# Patient Record
Sex: FEMALE | Race: WHITE | NOT HISPANIC OR LATINO | Employment: OTHER | URBAN - METROPOLITAN AREA
[De-identification: names, ages, dates, MRNs, and addresses within clinical notes are randomized per-mention and may not be internally consistent; named-entity substitution may affect disease eponyms.]

---

## 2018-03-06 ENCOUNTER — CONVERSION ENCOUNTER (OUTPATIENT)
Dept: OTHER | Facility: HOSPITAL | Age: 58
End: 2018-03-06

## 2018-03-14 ENCOUNTER — OFFICE VISIT CONVERTED (OUTPATIENT)
Dept: FAMILY MEDICINE CLINIC | Age: 58
End: 2018-03-14
Attending: FAMILY MEDICINE

## 2018-07-05 ENCOUNTER — OFFICE VISIT CONVERTED (OUTPATIENT)
Dept: FAMILY MEDICINE CLINIC | Age: 58
End: 2018-07-05
Attending: FAMILY MEDICINE

## 2018-10-10 ENCOUNTER — OFFICE VISIT CONVERTED (OUTPATIENT)
Dept: FAMILY MEDICINE CLINIC | Age: 58
End: 2018-10-10
Attending: FAMILY MEDICINE

## 2019-02-01 ENCOUNTER — OFFICE VISIT CONVERTED (OUTPATIENT)
Dept: FAMILY MEDICINE CLINIC | Age: 59
End: 2019-02-01
Attending: FAMILY MEDICINE

## 2019-02-01 ENCOUNTER — HOSPITAL ENCOUNTER (OUTPATIENT)
Dept: OTHER | Facility: HOSPITAL | Age: 59
Discharge: HOME | End: 2019-02-01
Attending: FAMILY MEDICINE

## 2019-02-01 LAB
ALBUMIN SERPL-MCNC: 4.6 G/DL (ref 3.5–5)
ALBUMIN/GLOB SERPL: 1.4 {RATIO} (ref 1.4–2.6)
ALP SERPL-CCNC: 90 U/L (ref 53–141)
ALT SERPL-CCNC: 7 U/L (ref 10–40)
ANION GAP SERPL CALC-SCNC: 20 MMOL/L (ref 8–19)
AST SERPL-CCNC: 10 U/L (ref 15–50)
BILIRUB SERPL-MCNC: 0.21 MG/DL (ref 0.2–1.3)
BUN SERPL-MCNC: 12 MG/DL (ref 5–25)
BUN/CREAT SERPL: 18 {RATIO} (ref 6–20)
CALCIUM SERPL-MCNC: 9.4 MG/DL (ref 8.7–10.4)
CHLORIDE SERPL-SCNC: 101 MMOL/L (ref 99–111)
CHOLEST SERPL-MCNC: 207 MG/DL (ref 107–200)
CHOLEST/HDLC SERPL: 3.7 {RATIO} (ref 3–6)
CONV CO2: 26 MMOL/L (ref 22–32)
CONV TOTAL PROTEIN: 7.8 G/DL (ref 6.3–8.2)
CREAT UR-MCNC: 0.65 MG/DL (ref 0.5–0.9)
GFR SERPLBLD BASED ON 1.73 SQ M-ARVRAT: >60 ML/MIN/{1.73_M2}
GLOBULIN UR ELPH-MCNC: 3.2 G/DL (ref 2–3.5)
GLUCOSE SERPL-MCNC: 127 MG/DL (ref 65–99)
HDLC SERPL-MCNC: 56 MG/DL (ref 40–60)
LDLC SERPL CALC-MCNC: 102 MG/DL (ref 70–100)
OSMOLALITY SERPL CALC.SUM OF ELEC: 297 MOSM/KG (ref 273–304)
POTASSIUM SERPL-SCNC: 3.9 MMOL/L (ref 3.5–5.3)
SODIUM SERPL-SCNC: 143 MMOL/L (ref 135–147)
TRIGL SERPL-MCNC: 247 MG/DL (ref 40–150)
TSH SERPL-ACNC: 0.37 M[IU]/L (ref 0.27–4.2)
VLDLC SERPL-MCNC: 49 MG/DL (ref 5–37)

## 2019-02-18 ENCOUNTER — OFFICE VISIT CONVERTED (OUTPATIENT)
Dept: PLASTIC SURGERY | Facility: CLINIC | Age: 59
End: 2019-02-18
Attending: PLASTIC SURGERY

## 2019-03-11 ENCOUNTER — HOSPITAL ENCOUNTER (OUTPATIENT)
Dept: OTHER | Facility: HOSPITAL | Age: 59
Discharge: HOME | End: 2019-03-11
Attending: FAMILY MEDICINE

## 2019-05-01 ENCOUNTER — OFFICE VISIT CONVERTED (OUTPATIENT)
Dept: FAMILY MEDICINE CLINIC | Age: 59
End: 2019-05-01
Attending: FAMILY MEDICINE

## 2019-05-21 ENCOUNTER — HOSPITAL ENCOUNTER (OUTPATIENT)
Dept: OTHER | Facility: HOSPITAL | Age: 59
Discharge: HOME | End: 2019-05-21
Attending: FAMILY MEDICINE

## 2019-05-21 LAB
CREAT BLD-MCNC: 0.7 MG/DL (ref 0.6–1.4)
GFR SERPLBLD BASED ON 1.73 SQ M-ARVRAT: >60 ML/MIN/{1.73_M2}

## 2019-07-24 ENCOUNTER — OFFICE VISIT CONVERTED (OUTPATIENT)
Dept: OTOLARYNGOLOGY | Facility: CLINIC | Age: 59
End: 2019-07-24
Attending: OTOLARYNGOLOGY

## 2019-08-01 ENCOUNTER — OFFICE VISIT CONVERTED (OUTPATIENT)
Dept: FAMILY MEDICINE CLINIC | Age: 59
End: 2019-08-01
Attending: FAMILY MEDICINE

## 2019-08-01 ENCOUNTER — HOSPITAL ENCOUNTER (OUTPATIENT)
Dept: OTHER | Facility: HOSPITAL | Age: 59
Discharge: HOME | End: 2019-08-01
Attending: FAMILY MEDICINE

## 2019-08-01 LAB
ALBUMIN SERPL-MCNC: 4.3 G/DL (ref 3.5–5)
ALBUMIN/GLOB SERPL: 1.4 {RATIO} (ref 1.4–2.6)
ALP SERPL-CCNC: 96 U/L (ref 53–141)
ALT SERPL-CCNC: 11 U/L (ref 10–40)
ANION GAP SERPL CALC-SCNC: 20 MMOL/L (ref 8–19)
AST SERPL-CCNC: 12 U/L (ref 15–50)
BILIRUB SERPL-MCNC: 0.25 MG/DL (ref 0.2–1.3)
BUN SERPL-MCNC: 12 MG/DL (ref 5–25)
BUN/CREAT SERPL: 17 {RATIO} (ref 6–20)
CALCIUM SERPL-MCNC: 9.5 MG/DL (ref 8.7–10.4)
CHLORIDE SERPL-SCNC: 103 MMOL/L (ref 99–111)
CHOLEST SERPL-MCNC: 205 MG/DL (ref 107–200)
CHOLEST/HDLC SERPL: 3.6 {RATIO} (ref 3–6)
CONV CO2: 24 MMOL/L (ref 22–32)
CONV TOTAL PROTEIN: 7.3 G/DL (ref 6.3–8.2)
CREAT UR-MCNC: 0.69 MG/DL (ref 0.5–0.9)
GFR SERPLBLD BASED ON 1.73 SQ M-ARVRAT: >60 ML/MIN/{1.73_M2}
GLOBULIN UR ELPH-MCNC: 3 G/DL (ref 2–3.5)
GLUCOSE SERPL-MCNC: 134 MG/DL (ref 65–99)
HDLC SERPL-MCNC: 57 MG/DL (ref 40–60)
LDLC SERPL CALC-MCNC: 94 MG/DL (ref 70–100)
OSMOLALITY SERPL CALC.SUM OF ELEC: 298 MOSM/KG (ref 273–304)
POTASSIUM SERPL-SCNC: 3.7 MMOL/L (ref 3.5–5.3)
SODIUM SERPL-SCNC: 143 MMOL/L (ref 135–147)
TRIGL SERPL-MCNC: 269 MG/DL (ref 40–150)
TSH SERPL-ACNC: 1.63 M[IU]/L (ref 0.27–4.2)
VLDLC SERPL-MCNC: 54 MG/DL (ref 5–37)

## 2019-11-04 ENCOUNTER — OFFICE VISIT CONVERTED (OUTPATIENT)
Dept: FAMILY MEDICINE CLINIC | Age: 59
End: 2019-11-04
Attending: FAMILY MEDICINE

## 2019-12-04 ENCOUNTER — HOSPITAL ENCOUNTER (OUTPATIENT)
Dept: OTHER | Facility: HOSPITAL | Age: 59
Discharge: HOME | End: 2019-12-04
Attending: PSYCHIATRY & NEUROLOGY

## 2020-02-05 ENCOUNTER — OFFICE VISIT CONVERTED (OUTPATIENT)
Dept: FAMILY MEDICINE CLINIC | Age: 60
End: 2020-02-05
Attending: FAMILY MEDICINE

## 2020-05-05 ENCOUNTER — OFFICE VISIT CONVERTED (OUTPATIENT)
Dept: FAMILY MEDICINE CLINIC | Age: 60
End: 2020-05-05
Attending: FAMILY MEDICINE

## 2020-08-05 ENCOUNTER — OFFICE VISIT CONVERTED (OUTPATIENT)
Dept: FAMILY MEDICINE CLINIC | Age: 60
End: 2020-08-05
Attending: FAMILY MEDICINE

## 2020-09-03 ENCOUNTER — HOSPITAL ENCOUNTER (OUTPATIENT)
Dept: OTHER | Facility: HOSPITAL | Age: 60
Discharge: HOME | End: 2020-09-03
Attending: FAMILY MEDICINE

## 2020-09-03 LAB
CREAT BLD-MCNC: 0.8 MG/DL (ref 0.6–1.4)
GFR SERPLBLD BASED ON 1.73 SQ M-ARVRAT: >60 ML/MIN/{1.73_M2}

## 2020-09-24 ENCOUNTER — HOSPITAL ENCOUNTER (OUTPATIENT)
Dept: OTHER | Facility: HOSPITAL | Age: 60
Discharge: HOME | End: 2020-09-24
Attending: FAMILY MEDICINE

## 2020-10-14 ENCOUNTER — OFFICE VISIT CONVERTED (OUTPATIENT)
Dept: FAMILY MEDICINE CLINIC | Age: 60
End: 2020-10-14
Attending: FAMILY MEDICINE

## 2020-12-16 ENCOUNTER — OFFICE VISIT CONVERTED (OUTPATIENT)
Dept: OTOLARYNGOLOGY | Facility: CLINIC | Age: 60
End: 2020-12-16
Attending: OTOLARYNGOLOGY

## 2020-12-21 ENCOUNTER — HOSPITAL ENCOUNTER (OUTPATIENT)
Dept: OTHER | Facility: HOSPITAL | Age: 60
Discharge: HOME | End: 2020-12-21
Attending: OTOLARYNGOLOGY

## 2020-12-23 ENCOUNTER — OFFICE VISIT CONVERTED (OUTPATIENT)
Dept: OTOLARYNGOLOGY | Facility: CLINIC | Age: 60
End: 2020-12-23
Attending: OTOLARYNGOLOGY

## 2021-03-31 ENCOUNTER — OFFICE VISIT CONVERTED (OUTPATIENT)
Dept: FAMILY MEDICINE CLINIC | Age: 61
End: 2021-03-31
Attending: FAMILY MEDICINE

## 2021-03-31 ENCOUNTER — HOSPITAL ENCOUNTER (OUTPATIENT)
Dept: OTHER | Facility: HOSPITAL | Age: 61
Discharge: HOME | End: 2021-03-31
Attending: FAMILY MEDICINE

## 2021-03-31 LAB
ALBUMIN SERPL-MCNC: 4.2 G/DL (ref 3.5–5)
ALBUMIN/GLOB SERPL: 1.4 {RATIO} (ref 1.4–2.6)
ALP SERPL-CCNC: 88 U/L (ref 43–160)
ALT SERPL-CCNC: 9 U/L (ref 10–40)
ANION GAP SERPL CALC-SCNC: 17 MMOL/L (ref 8–19)
APPEARANCE UR: ABNORMAL
AST SERPL-CCNC: 13 U/L (ref 15–50)
BACTERIA UR CULT: NORMAL
BACTERIA UR QL AUTO: ABNORMAL
BILIRUB SERPL-MCNC: 0.21 MG/DL (ref 0.2–1.3)
BILIRUB UR QL: NEGATIVE
BUN SERPL-MCNC: 18 MG/DL (ref 5–25)
BUN/CREAT SERPL: 21 {RATIO} (ref 6–20)
CALCIUM SERPL-MCNC: 9.2 MG/DL (ref 8.7–10.4)
CASTS URNS QL MICRO: ABNORMAL /[LPF]
CHLORIDE SERPL-SCNC: 102 MMOL/L (ref 99–111)
CHOLEST SERPL-MCNC: 245 MG/DL (ref 107–200)
CHOLEST/HDLC SERPL: 4.2 {RATIO} (ref 3–6)
COLOR UR: YELLOW
CONV CO2: 25 MMOL/L (ref 22–32)
CONV LEUKOCYTE ESTERASE: ABNORMAL
CONV TOTAL PROTEIN: 7.3 G/DL (ref 6.3–8.2)
CONV UROBILINOGEN IN URINE BY AUTOMATED TEST STRIP: 0.2 {EHRLICHU}/DL (ref 0.1–1)
CREAT UR-MCNC: 0.87 MG/DL (ref 0.5–0.9)
EPI CELLS #/AREA URNS HPF: ABNORMAL /[HPF]
GFR SERPLBLD BASED ON 1.73 SQ M-ARVRAT: >60 ML/MIN/{1.73_M2}
GLOBULIN UR ELPH-MCNC: 3.1 G/DL (ref 2–3.5)
GLUCOSE 24H UR-MCNC: NEGATIVE MG/DL
GLUCOSE SERPL-MCNC: 97 MG/DL (ref 65–99)
HDLC SERPL-MCNC: 58 MG/DL (ref 40–60)
HGB UR QL STRIP: ABNORMAL
KETONES UR QL STRIP: NEGATIVE MG/DL
LDLC SERPL CALC-MCNC: 153 MG/DL (ref 70–100)
MUCOUS THREADS URNS QL MICRO: ABNORMAL
NITRITE UR-MCNC: POSITIVE MG/ML
OSMOLALITY SERPL CALC.SUM OF ELEC: 292 MOSM/KG (ref 273–304)
PH UR STRIP.AUTO: 5.5 [PH] (ref 5–8)
POTASSIUM SERPL-SCNC: 3.9 MMOL/L (ref 3.5–5.3)
PROT UR-MCNC: NEGATIVE MG/DL
RBC # BLD AUTO: ABNORMAL /[HPF]
SODIUM SERPL-SCNC: 140 MMOL/L (ref 135–147)
SP GR UR STRIP: 1.02 (ref 1–1.03)
SPECIMEN SOURCE: ABNORMAL
TRIGL SERPL-MCNC: 170 MG/DL (ref 40–150)
TSH SERPL-ACNC: 1.99 M[IU]/L (ref 0.27–4.2)
UNIDENT CRYS URNS QL MICRO: ABNORMAL /[HPF]
VLDLC SERPL-MCNC: 34 MG/DL (ref 5–37)
WBC #/AREA URNS HPF: ABNORMAL /[HPF]

## 2021-04-02 LAB
AMPICILLIN SUSC ISLT: 8
AMPICILLIN+SULBAC SUSC ISLT: 4
ASO AB SERPL-ACNC: 32 [IU]/ML (ref 0–200)
BACTERIA UR CULT: ABNORMAL
CEFAZOLIN SUSC ISLT: <=4
CEFEPIME SUSC ISLT: <=0.12
CEFTAZIDIME SUSC ISLT: <=1
CEFTRIAXONE SUSC ISLT: <=0.25
CIPROFLOXACIN SUSC ISLT: <=0.25
CONV RHEUMATOID FACTOR IGM: <10 [IU]/ML (ref 0–14)
CRP SERPL-MCNC: 12.8 MG/L (ref 0–5)
DSDNA AB SER-ACNC: NEGATIVE [IU]/ML
ENA AB SER IA-ACNC: NEGATIVE {RATIO}
ERTAPENEM SUSC ISLT: <=0.12
ERYTHROCYTE [SEDIMENTATION RATE] IN BLOOD: 21 MM/H (ref 0–30)
GENTAMICIN SUSC ISLT: <=1
LEVOFLOXACIN SUSC ISLT: <=0.12
NITROFURANTOIN SUSC ISLT: <=16
PHOSPHATE SERPL-MCNC: 3.2 MG/DL (ref 2.4–4.5)
PIP+TAZO SUSC ISLT: <=4
TMP SMX SUSC ISLT: <=20
TOBRAMYCIN SUSC ISLT: <=1
URATE SERPL-MCNC: 5.4 MG/DL (ref 2.5–7.5)

## 2021-05-14 VITALS — RESPIRATION RATE: 16 BRPM | BODY MASS INDEX: 35.38 KG/M2 | HEIGHT: 64 IN | WEIGHT: 207.25 LBS | TEMPERATURE: 97.9 F

## 2021-05-14 VITALS — RESPIRATION RATE: 16 BRPM | TEMPERATURE: 96.7 F | WEIGHT: 204.5 LBS | HEIGHT: 64 IN | BODY MASS INDEX: 34.91 KG/M2

## 2021-05-14 NOTE — PROGRESS NOTES
Progress Note      Patient Name: Daisy Cesar   Patient ID: [id number]   Sex: Female   YOB: 1960    Primary Care Provider: Elise Cueto MD   Referring Provider: Elise Cueto MD    Visit Date: December 23, 2020    Provider: Gil Diane MD   Location: [facility]   Location Address: [address] [place] [address]  [address] [place] [address]  [id number]   Location Phone: [phone]          Chief Complaint     1.  Left facial pain and pressure, possible sinusitis    2.  Allergic rhinitis    3.  Benign paroxysmal positional vertigo, right ear       History Of Present Illness  Daisy Cesar is a 60 year old /White,  or  female who presents to the office today for a follow-up visit.      She presents the clinic today for follow-up regarding nasal congestion, allergic rhinitis, and sinusitis, as well as vertigo spells.  She does have issues with allergic rhinitis, and was having significant left-sided facial pain and pressure after having a maxillary tooth pulled on that side in September.  I obtained a CT scan of her sinuses and reviewed the imaging today.  The scan shows no significant evidence of sinusitis and is essentially completely normal as far as her sinuses and nasal anatomy is concerned.  There was no evidence of soft tissue mass within the left cheek area.  She notes intermittent nasal congestion which is worsened by allergies.  She is doing intermittent nasal saline washes.    Her secondary complaint is vertigo spells which are more frequent when she rolls over in bed.  This was mostly happening on the left side previously, and I have done Epley maneuvers on her for this.  She notes that she has several spells per week.  She describes them as short-lived vertigo.  She also has lightheadedness on orthostatic changes and notes no vertigo with the spells.       Past Medical History  Acne; Allergic rhinitis; Allergies; Anemia; Arthritis;  Asthma; Cataract; Cervicalgia; Change in voice; Chronic Sinusitis; Cough; Difficulty swallowing; Ear pain, right; Epistaxis, recurrent; Facial pressure; Head injury; Hearing loss; High blood pressure; High cholesterol; Hoarseness of voice; Hx of cold sores; Hypotension; Keloid; Neck swelling; Sinus drainage         Past Surgical History  Arthroscopic Knee Surgery; Arthroscopic Shoulder Surgery; Carpal tunnel release; Cervical Fusion; Cholecstectomy; Gallbladder; Hysterectomy; Tonsilectomy; Tumor removal; Ulnar nerve transposition, left; Ulnar nerve transposition, right         Medication List  acetaminophen 500 mg oral tablet; Benadryl 25 mg oral capsule; Caltrate 600 + D 600 mg (1,500 mg)-800 unit oral tablet,chewable; diclofenac sodium 1 % topical gel; dicyclomine 10 mg oral capsule; Levoxyl 50 mcg oral tablet; meloxicam 15 mg oral tablet; metronidazole 0.75 % topical cream; Migranal 0.5 mg/pump act. (4 mg/mL) nasal spray,non-aerosol; pravastatin 40 mg oral tablet; Prilosec 40 mg oral capsule,delayed release(DR/EC); promethazine 25 mg oral tablet; Ventolin HFA 90 mcg/actuation inhalation HFA aerosol inhaler; Zanaflex 4 mg oral capsule         Allergy List  morphine; PENICILLINS         Family Medical History  Family history of back pain         Social History  Tobacco (Never)         Review of Systems  · Constitutional  o Admits  o : night sweats  o Denies  o : fever, weight loss  · Eyes  o Denies  o : discharge from eye, impaired vision  · HENT  o Admits  o : *See HPI  · Cardiovascular  o Denies  o : chest pain, irregular heart beats  · Respiratory  o Denies  o : shortness of breath, wheezing, coughing up blood  · Gastrointestinal  o Admits  o : heartburn, reflux  o Denies  o : vomiting blood  · Genitourinary  o Admits  o : frequency of urine  · Integument  o Admits  o : skin dryness  o Denies  o : rash  · Neurologic  o Admits  o : loss of balance, dizziness  o Denies  o : seizures, loss of  "consciousness  · Endocrine  o Admits  o : cold intolerance, heat intolerance  · Heme-Lymph  o Admits  o : easy bleeding  o Denies  o : anemia      Vitals  Date Time BP Position Site L\R Cuff Size HR RR TEMP (F) WT  HT  BMI kg/m2 BSA m2 O2 Sat FR L/min FiO2 HC       12/23/2020 11:40 AM       16 96.7 204lbs 8oz 5'  4\" 35.1 2.05             Physical Examination  · Constitutional  o Appearance  o : well developed, well-nourished, alert and in no acute distress, voice clear and strong  · Head and Face  o Head  o :   § Inspection  § : no deformities or lesions  o Face  o :   § Inspection  § : No facial lesions; House-Brackmann I/VI bilaterally  § Palpation  § : No TMJ crepitus nor  muscle tenderness bilaterally  · Eyes  o Vision  o :   § Visual Fields  § : Extraocular movements are intact. No spontaneous or gaze-induced nystagmus.  o Conjunctivae  o : clear  o Sclerae  o : clear  o Pupils and Irises  o : pupils equal, round, and reactive to light.   · Ears, Nose, Mouth and Throat  o Ears  o :   § External Ears  § : appearance within normal limits, no lesions present  § Otoscopic Examination  § : tympanic membrane appearance within normal limits bilaterally without perforations, well-aerated middle ears  § Hearing  § : intact to conversational voice both ears  o Nose  o :   § External Nose  § : appearance normal  § Intranasal Exam  § : mucosa within normal limits, vestibules normal, no intranasal lesions present, septum midline, sinuses non tender to percussion  o Oral Cavity  o :   § Oral Mucosa  § : oral mucosa normal without pallor or cyanosis  § Lips  § : lip appearance normal  § Teeth  § : normal dentition for age  § Gums  § : gums pink, non-swollen, no bleeding present  § Tongue  § : tongue appearance normal; normal mobility  § Palate  § : hard palate normal, soft palate appearance normal with symmetric mobility  o Throat  o :   § Oropharynx  § : no inflammation or lesions present, tonsils within normal " limits  § Hypopharynx  § : appearance within normal limits, superior epiglottis within normal limits  § Larynx  § : appearance within normal limits, vocal cords within normal limits, no lesions present  · Neck  o Inspection/Palpation  o : normal appearance, no masses or tenderness, trachea midline; thyroid size normal, nontender, no nodules or masses present on palpation  · Respiratory  o Respiratory Effort  o : breathing unlabored  o Inspection of Chest  o : normal appearance, no retractions  · Cardiovascular  o Heart  o : regular rate and rhythm  · Lymphatic  o Neck  o : no lymphadenopathy present  o Supraclavicular Nodes  o : no lymphadenopathy present  o Preauricular Nodes  o : no lymphadenopathy present  · Skin and Subcutaneous Tissue  o General Inspection  o : Regarding face and neck - there are no rashes present, no lesions present, and no areas of discoloration  · Neurologic  o Cranial Nerves  o : cranial nerves II-XII are grossly intact bilaterally  o Gait and Station  o : normal gait, [place]-Hallpike maneuver with geotropic nystagmus with the right ear down, consistent with benign paroxysmal positional vertigo, Epley maneuver performed today in the right side. Able to stand without diffculty  · Psychiatric  o Judgement and Insight  o : judgment and insight intact  o Mood and Affect  o : mood normal, affect appropriate          Assessment  · Benign paroxysmal positional vertigo, right     386.11/H81.11  · Facial pain     784.0/R51      Plan  · Medications  o Medications have been Reconciled  o Transition of Care or Provider Policy  · Instructions  o She presents the clinic today for follow-up regarding nasal congestion, allergic rhinitis, and sinusitis, as well as vertigo spells. She does have issues with allergic rhinitis, and was having significant left-sided facial pain and pressure after having a maxillary tooth pulled on that side in September. I obtained a CT scan of her sinuses and reviewed the imaging  today. The scan shows no significant evidence of sinusitis and is essentially completely normal as far as her sinuses and nasal anatomy is concerned. There was no evidence of soft tissue mass within the left cheek area. She notes intermittent nasal congestion which is worsened by allergies. She is doing intermittent nasal saline washes, and I will have her continue her nasal regimen.Her secondary complaint is vertigo spells which are more frequent when she rolls over in bed. This was mostly happening on the left side previously, and I have done Epley maneuvers on her for this. She notes that she has several spells per week. She describes them as short-lived vertigo. She also has lightheadedness on orthostatic changes and notes no vertigo with the spells. [place]-Hallpike maneuver revealed geotropic nystagmus with the right ear down, and Epley maneuver was performed today in the clinic. I will have her sleep in a head of bed elevated position for the next several days, and she will contact me should she have continued issues with this.  o Electronically Identified Patient Education Materials Provided Electronically  · Correspondence  o ENT Letter to Referring MD (Elise Cueto MD) - 12/23/2020            Electronically Signed by: Gil Diane MD -Author on December 23, 2020 12:13:48 PM

## 2021-05-14 NOTE — PROGRESS NOTES
Progress Note      Patient Name: Daisy Cesar   Patient ID: [id number]   Sex: Female   YOB: 1960    Primary Care Provider: Elise Cueto MD   Referring Provider: Elise Cueto MD    Visit Date: December 16, 2020    Provider: Gil Diane MD   Location: [facility]   Location Address: [address] [place] [address]  [address] [place] [address]  [id number]   Location Phone: [phone]          Chief Complaint     1.  Left-sided facial pressure    2.  Allergic rhinitis    3.  Chronic sinusitis       History Of Present Illness  Daisy Cesar is a 60 year old /White,  or  female who presents to the office today for a follow-up visit.      She presents the clinic today for evaluation of a new issue of facial pain and pressure along the left cheek and periorbital area.  She notes that she recently had an upper tooth extraction, but is not sure of the symptoms are related as the area has now healed.  She does have a history of chronic sinusitis and has had endoscopic sinus surgery many years ago.  She denies any purulent drainage, but has had some nasal congestion that she notes bilaterally.  She was previously seen in July 2019 for issues with epistaxis, and has really not had any difficulties since then.       Past Medical History  Acne; Allergies; Anemia; Arthritis; Asthma; Cataract; Cervicalgia; Change in voice; Cough; Difficulty swallowing; Ear pain, right; Epistaxis, recurrent; Head injury; Hearing loss; High blood pressure; High cholesterol; Hoarseness of voice; Hx of cold sores; Hypotension; Keloid; Neck swelling; Sinus drainage         Past Surgical History  Arthroscopic Knee Surgery; Arthroscopic Shoulder Surgery; Carpal tunnel release; Cervical Fusion; Cholecstectomy; Gallbladder; Hysterectomy; Tonsilectomy; Tumor removal; Ulnar nerve transposition, left; Ulnar nerve transposition, right         Medication List  acetaminophen 500 mg oral  "tablet; Benadryl 25 mg oral capsule; Caltrate 600 + D 600 mg (1,500 mg)-800 unit oral tablet,chewable; diclofenac sodium 1 % topical gel; dicyclomine 10 mg oral capsule; Levoxyl 50 mcg oral tablet; meloxicam 15 mg oral tablet; metronidazole 0.75 % topical cream; Migranal 0.5 mg/pump act. (4 mg/mL) nasal spray,non-aerosol; pravastatin 40 mg oral tablet; Prilosec 40 mg oral capsule,delayed release(DR/EC); promethazine 25 mg oral tablet; Ventolin HFA 90 mcg/actuation inhalation HFA aerosol inhaler; Zanaflex 4 mg oral capsule         Allergy List  morphine; PENICILLINS         Family Medical History  Spine Problems         Social History  Tobacco (Never)         Review of Systems  · Constitutional  o Admits  o : night sweats  o Denies  o : fever, weight loss  · Eyes  o Admits  o : impaired vision  o Denies  o : discharge from eye  · HENT  o Admits  o : *See HPI  · Cardiovascular  o Denies  o : chest pain, irregular heart beats  · Respiratory  o Denies  o : shortness of breath, wheezing, coughing up blood  · Gastrointestinal  o Admits  o : heartburn, reflux  o Denies  o : vomiting blood  · Genitourinary  o Denies  o : frequency of urine  · Integument  o Denies  o : rash, skin dryness  · Neurologic  o Admits  o : loss of balance, dizziness  o Denies  o : seizures, loss of consciousness  · Endocrine  o Admits  o : cold intolerance, heat intolerance  · Heme-Lymph  o Admits  o : easy bleeding  o Denies  o : anemia      Vitals  Date Time BP Position Site L\R Cuff Size HR RR TEMP (F) WT  HT  BMI kg/m2 BSA m2 O2 Sat FR L/min FiO2        12/16/2020 01:15 PM       16 97.9 207lbs 4oz 5'  4\" 35.57 2.06             Physical Examination  · Constitutional  o Appearance  o : well developed, well-nourished, alert and in no acute distress, voice clear and strong  · Head and Face  o Head  o :   § Inspection  § : no deformities or lesions  o Face  o :   § Inspection  § : No facial lesions; House-Brackmann I/VI " bilaterally  § Palpation  § : No TMJ crepitus nor  muscle tenderness bilaterally  · Eyes  o Vision  o :   § Visual Fields  § : Extraocular movements are intact. No spontaneous or gaze-induced nystagmus.  o Conjunctivae  o : clear  o Sclerae  o : clear  o Pupils and Irises  o : pupils equal, round, and reactive to light.   · Ears, Nose, Mouth and Throat  o Ears  o :   § External Ears  § : appearance within normal limits, no lesions present  § Otoscopic Examination  § : tympanic membrane appearance within normal limits bilaterally without perforations, well-aerated middle ears  § Hearing  § : intact to conversational voice both ears  o Nose  o :   § External Nose  § : appearance normal  § Intranasal Exam  § : mucosa within normal limits, vestibules normal, no intranasal lesions present, septum midline, sinuses non tender to percussion  o Oral Cavity  o :   § Oral Mucosa  § : oral mucosa normal without pallor or cyanosis  § Lips  § : lip appearance normal  § Teeth  § : normal dentition for age  § Gums  § : gums pink, non-swollen, no bleeding present  § Tongue  § : tongue appearance normal; normal mobility  § Palate  § : hard palate normal, soft palate appearance normal with symmetric mobility  o Throat  o :   § Oropharynx  § : no inflammation or lesions present, tonsils within normal limits  § Hypopharynx  § : appearance within normal limits, superior epiglottis within normal limits  § Larynx  § : appearance within normal limits, vocal cords within normal limits, no lesions present  o Nasopharyngoscopy  o : The patients nares were anesthetized with Lidocaine with Afrin. After this was done, the flexible nasopharyngoscope was passed through both the left and right sides. The nasal cavities free of any lesions, polyps, or purulence. There is some mild mucosal edema around the ostiomeatal complex on the left side. Postsurgical changes with stable appearance no evidence of  purulence.  · Neck  o Inspection/Palpation  o : normal appearance, no masses or tenderness, trachea midline; thyroid size normal, nontender, no nodules or masses present on palpation  · Respiratory  o Respiratory Effort  o : breathing unlabored  o Inspection of Chest  o : normal appearance, no retractions  · Cardiovascular  o Heart  o : regular rate and rhythm  · Lymphatic  o Neck  o : no lymphadenopathy present  o Supraclavicular Nodes  o : no lymphadenopathy present  o Preauricular Nodes  o : no lymphadenopathy present  · Skin and Subcutaneous Tissue  o General Inspection  o : Regarding face and neck - there are no rashes present, no lesions present, and no areas of discoloration  · Neurologic  o Cranial Nerves  o : cranial nerves II-XII are grossly intact bilaterally  o Gait and Station  o : normal gait, able to stand without diffculty  · Psychiatric  o Judgement and Insight  o : judgment and insight intact  o Mood and Affect  o : mood normal, affect appropriate          Assessment  · Allergic rhinitis     477.9/J30.9  · Nasal obstruction     478.19/J34.89  · Chronic sinusitis     473.9/J32.9      Plan  · Orders  o Nasal endoscopy [facility] ([id number]) - 473.9/J32.9, 477.9/J30.9, 478.19/J34.89 - 12/17/2020  o CT Maxillofacial Area without IV Contrast [facility] ([id number]) - 473.9/J32.9, 477.9/J30.9, 478.19/J34.89 - 12/17/2020  · Medications  o Medications have been Reconciled  o Transition of Care or Provider Policy  · Instructions  o She presents the clinic today for evaluation of a new issue of facial pain and pressure along the left cheek and periorbital area. She notes that she recently had an upper tooth extraction, but is not sure of the symptoms are related as the area has now healed. She does have a history of chronic sinusitis and has had endoscopic sinus surgery many years ago. She denies any purulent drainage, but has had some nasal congestion that she notes bilaterally. She was previously seen in July 2019 for issues  with epistaxis, and has really not had any difficulties since then. On exam today did perform a nasal endoscopy which shows postsurgical changes but no evidence of polyps or purulence. There is some mild congestion along the ostiomeatal complex on the left. I have ordered a CT scan of the sinuses and we will see her back in the clinic afterwards to discuss the results and any further management for this.  o Electronically Identified Patient Education Materials Provided Electronically  · Correspondence  o ENT Letter to Referring MD (Elise Cueto MD) - 12/17/2020            Electronically Signed by: Gil Diane MD -Author on December 17, 2020 10:42:59 AM

## 2021-05-15 VITALS
OXYGEN SATURATION: 97 % | HEIGHT: 64 IN | SYSTOLIC BLOOD PRESSURE: 144 MMHG | WEIGHT: 201.12 LBS | BODY MASS INDEX: 34.34 KG/M2 | DIASTOLIC BLOOD PRESSURE: 90 MMHG | TEMPERATURE: 98.7 F | HEART RATE: 98 BPM

## 2021-05-16 VITALS
BODY MASS INDEX: 35.34 KG/M2 | SYSTOLIC BLOOD PRESSURE: 117 MMHG | WEIGHT: 207 LBS | DIASTOLIC BLOOD PRESSURE: 79 MMHG | HEIGHT: 64 IN | HEART RATE: 103 BPM | OXYGEN SATURATION: 97 %

## 2021-05-18 NOTE — PROGRESS NOTES
Daisy CesarAris 1960     Office/Outpatient Visit    Visit Date: Fri, Feb 1, 2019 03:17 pm    Provider: Elise Cueto MD (Assistant: Raina Adame LPN)    Location: [facility]        Electronically signed by Elise Cueto MD on  02/01/2019 08:53:03 PM                             SUBJECTIVE:        CC:     Cristal is a 59 year old White female.  Medicare Wellness Exam;         HPI:     She is due for colonoscopy.  Pap smear is no longer indicated; s/p hysterectomy for endometriosis.  She is UTD on mammogram, last done 3/2018 and this was normal.  She is UTD on DEXA, last done 3/2018 and this showed osteopenia.  She is due for Shingrix, Havrix, Td and flu.  She is due for routine labs including HTN panel and TSH.     Cristal is here for a Medicare wellness visit.  ADVANCED DIRECTIVES: None     Returning to health checkup, the required HRA questions are integrated within this visit note. Family medical history and individual medical/surgical history were reviewed and updated.  A current height, weight, BMI, blood pressure, and pulse were recorded in the vitals section of the note and have been reviewed. Patient's medications, including supplements, were recorded in the chart and reviewed.  Current providers and suppliers were reviewed and updated.          Self-Assessment of Health: She rates her health as fair. She rates her confidence of being able to control/manage most of her health problems as very confident. Her physical/emotional health has limited her social activites moderately.  A review of cognitive impairment was performed, including ability to drive a car, manage finances, and any memory changes, and was found to be negative.  A review of functional ability, including bathing, dressing, walking, and urine/bowel continence as well as level of safety was performed and was found to be negative.  Falls Risk: Has fallen 2 or more times or had one fall with injury in the past year.  In  "regard to hearing, she reports having trouble hearing the TV/radio when others do not and having to strain to hear or understand conversations, but not wearing hearing aid(s).  Concerning home safety, she reports that at home she DOES have adequate lighting, a skid resistant shower/tub, grab bars in the bath and functioning smoke alarms, but not handrails on stairs or absence of throw rugs.          Immunization Status: Patient declines vaccinations; Physical Activity: She exercises but less than 20 minutes 3 days per week; Type of diet patient normally eats is described as \"Whatever I can afford to buy\" Tobacco: She has never smoked.  Preventative Health updated today         PHQ-9 Depression Screening: Completed form scanned and in chart; Total Score 7 Alcohol Consumption Screening: Completed form scanned and in chart; Total Score 0     ROS:     CONSTITUTIONAL:  Positive for fatigue.   Negative for fever.      EYES:  Negative for blurred vision.      E/N/T:  Positive for tinnitus.   Negative for diminished hearing or nasal congestion.      CARDIOVASCULAR:  Negative for chest pain, orthopnea, palpitations, paroxysmal nocturnal dyspnea, pedal edema and tachycardia.      RESPIRATORY:  Negative for recent cough and dyspnea.      GASTROINTESTINAL:  Positive for abdominal pain ( epigastric; LUQ ), constipation, nausea ( with migraines ) and vomiting.   Negative for diarrhea.      GENITOURINARY:  Negative for dysuria and urinary incontinence.      MUSCULOSKELETAL:  Positive for arthralgias and back pain.   Negative for myalgias.      INTEGUMENTARY/BREAST:  Positive for lipomatosis; tender.      NEUROLOGICAL:  Positive for headaches, paresthesia ( bilateral upper extremity; bilateral lower extremity ) and weakness ( bilateral upper extremity ).   Negative for dizziness or vertigo (resolved s/p Epley maneuvers with ENT).      PSYCHIATRIC:  Positive for feelings of stress, difficulty concentrating and sleep disturbance.   " Negative for anxiety, depression, anhedonia, mood swings, sadness or suicidal thoughts.          PMH/FMH/SH:     Last Reviewed on 2/01/2019 03:38 PM by Elise Cueto    Past Medical History:                 PAST MEDICAL HISTORY         Hypertension     Asthma     Gastroesophageal Reflux Disease    Irritable Bowel Syndrome     Migraine Headaches         PREVENTIVE HEALTH MAINTENANCE             BONE DENSITY: was last done 3/7/2018 with the following abnormality noted-- osteopenia     COLORECTAL CANCER SCREENING: colonoscopy with normal results; 2000     Hepatitis C Medicare Screening: was last done 12/2017     MAMMOGRAM: Done within last 2 years and results in are chart was last done 3/7/2018 with normal results     PAP SMEAR: No longer indicated due to age and history         PAST MEDICAL HISTORY             CURRENT MEDICAL PROVIDERS:    Primary care provider ( Dr. Cueto )    Cardiologist: Dr. Amezcua    E/N/T: Dr. Lomeli    Neurologist: Dr. Pierre         Surgical History:         Hysterectomy: for endometriosis;     Tonsillectomy      Cholecystectomy    lipoma removal;      C4-C5 and C5-C6 fusion;    R and L carpal tunnel release;    R ulnar nerve release;    R knee surgery;         Family History:     Father: Hypertension;  bladder     Brother(s): Hepatic Carcinoma     Sister(s): thyroid CA     Paternal Grandfather: Coronary Artery Disease     Maternal Grandmother: Breast Cancer         Social History:     Occupation:    Disabled. formerly in hospital administration;     Marital Status: Single     Children: None         Tobacco/Alcohol/Supplements:     Last Reviewed on 2/01/2019 03:38 PM by Elise Cueto    Tobacco: She has never smoked.          Substance Abuse History:     Last Reviewed on 2/01/2019 03:38 PM by Elise Cueto        Mental Health History:     Last Reviewed on 2/01/2019 03:38 PM by Elise Cueto        Communicable Diseases (eg STDs):     Last Reviewed on 2/01/2019 03:38 PM by  Elise Cueto            Current Problems:     Last Reviewed on 10/10/2018 02:14 PM by Elise Cueto    Thyroid nodule     Migraine, unspecified, without mention of intractable migraine without mention of status migrainosus     Esophageal stricture     Osteopenia     Use of high risk medications     Hypothyroidism     Hyperlipidemia     Classic migraine     Upper back pain     Dizziness     Rosacea     Lipomatosis     Asthma     Essential hypertension     Irritable bowel syndrome     GERD     Chronic low back pain     BPPV         Immunizations:     None        Allergies:     Last Reviewed on 2/01/2019 03:19 PM by Raina Adame    Penicillins:    Morphine Sulfate: migraine headache    Topiramate: (Adverse Reaction)    Zonisamide: diarrhea, stomach pain (Adverse Reaction)    Zofran ODT: palpitations (Adverse Reaction)    Zofran ODT: palpitations (Adverse Reaction)        Current Medications:     Last Reviewed on 2/01/2019 03:21 PM by Raina Adame    Levoxyl 0.05mg Tablet Take 1 tablet(s) by mouth daily     Meloxicam 15mg Tablet 1 tab daily     Pravastatin 40mg Tablet 1 tab daily     Voltaren  1% Topical Gel Apply 4 g to affected area up to 4 times a day     Migranal 4mg/1ml Nasal Spray 1 spray in each nostril, repeat in 15min, no more than 6 sprays/day & 8 sprays/week     Tizanidine HCl 4mg Capsules QID     Amitriptyline HCl 100mg Tablet Take 1 tablet(s) by mouth at bedtime     Azelastine HCl 0.1% Nasal Spray 1 spray each nostril BID     Metronidazole 0.75% Cream Apply thin film to affected area am and pm     Ventolin HFA 90mcg/1actuation Oral Inhaler 1 puff inhaled every 4-6 hours prn SOB/wheezing     Dicyclomine HCl 10mg Capsules Take 1 capsule(s) by mouth bid prn cramping     Omeprazole 40mg Capsules, Extended Release 1 capsule daily     Promethazine HCl 25mg Tablet 1 PO Q 8 Hrs PRN Nausea     Acetaminophen 500mg Tablet 1 TAB PO TID     Caltrate 600 + D 600mg/800IU Tablet Take 1 tablet(s) by mouth  daily     Diphenhydramine HCl 25mg Capsules 1 capsule q am daily     Vitamin D 500mg daily         OBJECTIVE:        Vitals:         Current: 2/1/2019 3:22:28 PM    Ht:  5 ft, 4 in;  Wt: 208.6 lbs;  BMI: 35.8    T: 97.7 F (oral);  BP: 145/93 mm Hg (right arm, sitting);  P: 105 bpm (right arm (BP Cuff), sitting);  sCr: 0.67 mg/dL;  GFR: 102.05    VA: 20/25 OD, 20/20 OS        Repeat:     4:09:16 PM     BP:   140/87mm Hg (right arm, sitting)         Exams:     PHYSICAL EXAM:     GENERAL: vital signs recorded - well developed, well nourished;  well groomed;  no apparent distress;     EYES: extraocular movements intact; conjunctiva and cornea are normal; PERRLA;     E/N/T: OROPHARYNX:  normal mucosa, dentition, gingiva, and posterior pharynx;     RESPIRATORY: normal respiratory rate and pattern with no distress; normal breath sounds with no rales, rhonchi, wheezes or rubs;     CARDIOVASCULAR: normal rate; rhythm is regular;  no systolic murmur; no edema;     GASTROINTESTINAL: nontender; normal bowel sounds;     MUSCULOSKELETAL: normal gait; normal overall tone     NEUROLOGIC: mental status: alert and oriented x 3; Reflexes: brachioradialis: 2+; knee jerks: 2+;     PSYCHIATRIC: appropriate affect and demeanor; normal psychomotor function; normal speech pattern;         Procedures:     Neck pain     1. Toradol 30 mg given IM in the left hip; administered by  [employer];  lot number [id number]; expires 05/2020             ASSESSMENT           V70.0   Z00.00  Health checkup              DDx:     V79.0   Z13.89  Screening for depression              DDx:     244.9   E03.9  Hypothyroidism              DDx:     401   I10  Essential hypertension              DDx:     V76.51   Z12.11  Screening for cancer of colon              DDx:     346.90   G43.909  Migraine, unspecified, without mention of intractable migraine without mention of status migrainosus              DDx:     723.1   M54.2  Neck pain              DDx:     724.2   M54.5   Lower back pain              DDx:     715.00   M15.0   M15.9  Generalized osteoarthritis, site unspecified              DDx:     272.8   E88.2  Lipomatosis              DDx:     784.7   R04.0  Epistaxis              DDx:         ORDERS:         Meds Prescribed:       Refill of: Migranal (Dihydroergotamine Mesylate) 4mg/1ml Nasal Spray 1 spray in each nostril, repeat in 15min, no more than 6 sprays/day & 8 sprays/week  #30 (Thirty) each Refills: 2       Refill of: Voltaren  (Diclofenac Sodium) 1% Topical Gel Apply 4 g to affected area up to 4 times a day  #100 (One [place]) gm Refills: 5       Refill of: Amitriptyline HCl 100mg Tablet Take 1 tablet(s) by mouth at bedtime  #90 (Ninety) tablet(s) Refills: 1         Radiology/Test Orders:       3014F  Screening mammography results documented and reviewed (PV)1  (In-House)           Lab Orders:       45400  TSH - [facility] TSH  (Send-Out)         58225  HTNLP - [facility] CMP AND LIPID: [id number], [id number]  (Send-Out)         APPTO  Appointment need  (In-House)           Procedures Ordered:         Annual wellness visit, includes a PPPS, subsequent visit  (In-House)         REFER  Referral to Specialist or Other Facility  (Send-Out)         REFER  Referral to Specialist or Other Facility  (Send-Out)           Other Orders:         Depression screen negative  (In-House)         1100F  Pt screen for fall risk; document 2+ falls in the past yr or any fall w/injury in past year (HUANG)  (In-House)           Negative EtOH screen  (In-House)           Calculated BMI above the upper parameter and a follow-up plan was documented in the medical record  (In-House)         70677  Therapeutic injection  (In-House)           Toradol, per 15 mg (x2)                 PLAN:          Health checkup She is due for colonoscopy; referral placed.  Pap smear is no longer indicated; s/p hysterectomy for endometriosis.  She is UTD on mammogram, last done 3/2018 and this was normal.  She is  UTD on DEXA, last done 3/2018 and this showed osteopenia.  She is due for Shingrix, Havrix, Td and flu; declines flu.  Shingrix and Havrix can be done at pharmacy and Health Dept.  She is due for routine labs including HTN panel and TSH; ordered.    No fall risk, no memory issues, no signs/symptoms of depression.  She lives alone.  She is able to drive and perform ADLs/manage finances independently.  Hearing is adequate.  She does not have a living will; information given today on how to obtain one.  Preventive services handout and safety handout were given to her.  Current doctor list updated.  RTC 3 months.        SHE MAY CALL FOR AN APPT AFTER SHE SEES PLASTIC SURGERY IF SHE WANTS TO DISCUSS OPTIONS DISCUSSED, AND WE CAN WORK HER INTO AN ACUTE ILLNESS SPOT SOONER.  PLEASE KEEP CHRONIC F/U APPT IN THIS CASE.  BZM     MIPS PHQ-9 Depression Screening Completed form scanned and in chart; Total Score 7 Negative alcohol screen     MAMMOGRAM: Done within last 2 years and results in are chart     BMI Elevated - Follow-Up Plan: She was provided education on weight loss strategies     FOLLOW-UP: Schedule a follow-up visit in 3 months..  f/u migraine with [place]           Orders:         Annual wellness visit, includes a PPPS, subsequent visit  (In-House)           Depression screen negative  (In-House)         1100F  Pt screen for fall risk; document 2+ falls in the past yr or any fall w/injury in past year (HUANG)  (In-House)           Negative EtOH screen  (In-House)         3014F  Screening mammography results documented and reviewed (PV)1  (In-House)           Calculated BMI above the upper parameter and a follow-up plan was documented in the medical record  (In-House)         APPTO  Appointment need  (In-House)             Patient Education Handouts:       Physical Exam 60+ year, Female           Hypothyroidism     LABORATORY:  Labs ordered to be performed today include TSH.            Orders:        [id number]  TSH - [facility] TSH  (Send-Out)            Essential hypertension     LABORATORY:  Labs ordered to be performed today include HTN/Lipid Panel: CMP, Lipid.            Orders:       [id number]  HTNLP - [facility] CMP AND LIPID: [id number], [id number]  (Send-Out)            Screening for cancer of colon         REFERRALS:  Referral initiated to a general surgeon ( Dr. Nakul Sun; a colonoscopy ).            Orders:       REFER  Referral to Specialist or Other Facility  (Send-Out)            Migraine, unspecified, without mention of intractable migraine without mention of status migrainosus           Prescriptions:       Refill of: Migranal (Dihydroergotamine Mesylate) 4mg/1ml Nasal Spray 1 spray in each nostril, repeat in 15min, no more than 6 sprays/day & 8 sprays/week  #30 (Thirty) each Refills: 2       Refill of: Amitriptyline HCl 100mg Tablet Take 1 tablet(s) by mouth at bedtime  #90 (Ninety) tablet(s) Refills: 1          Neck pain She is using her Voltaren regularly and it is one of the few things that help her with her neck and back pain.  She had this denied by her insurance.  Please re-submit PA for Voltaren under the diagnoses of back pain, generalized OA, and neck pain.  PA #[id number] (has already been started; pt contacted insurance).          Toradol given today.     Narcotic or pain medication Toradol 30 mg           Prescriptions:       Refill of: Voltaren  (Diclofenac Sodium) 1% Topical Gel Apply 4 g to affected area up to 4 times a day  #100 (One [place]) gm Refills: 5           Orders:       [id number]  Therapeutic injection  (In-House)                     Toradol, per 15 mg (x2)          Lower back pain As per neck pain.          Generalized osteoarthritis, site unspecified As per neck pain.          Lipomatosis She uses the Voltaren gel also on her tender lipomas.          Epistaxis         REFERRALS:  Referral initiated to an E/N/T ( Dr. Lakhwinder Diane, [facility]; for evaluation of epistaxis ).            Orders:        REFER  Referral to Specialist or Other Facility  (Send-Out)               Patient Recommendations:        For  Health checkup:     Schedule a follow-up visit in 3 months.                APPOINTMENT INFORMATION:        Monday Tuesday Wednesday Thursday Friday Saturday Sunday            Time:___________________AM  PM   Date:_____________________             CHARGE CAPTURE           **Please note: ICD descriptions below are intended for billing purposes only and may not represent clinical diagnoses**        Primary Diagnosis:         V70.0 Health checkup            Z00.00    Encounter for general adult medical examination without abnormal findings              Orders:             Annual wellness visit, includes a PPPS, subsequent visit  (In-House)                Depression screen negative  (In-House)             1100F   Pt screen for fall risk; document 2+ falls in the past yr or any fall w/injury in past year (HUANG)  (In-House)                Negative EtOH screen  (In-House)             3014F   Screening mammography results documented and reviewed (PV)1  (In-House)                Calculated BMI above the upper parameter and a follow-up plan was documented in the medical record  (In-House)             APPTO   Appointment need  (In-House)           V79.0 Screening for depression            Z13.89    Encounter for screening for other disorder    244.9 Hypothyroidism            E03.9    Hypothyroidism, unspecified    401 Essential hypertension            I10    Essential (primary) hypertension    V76.51 Screening for cancer of colon            Z12.11    Encounter for screening for malignant neoplasm of colon    346.90 Migraine, unspecified, without mention of intractable migraine without mention of status migrainosus            G43.909    Migraine, unspecified, not intractable, without status migrainosus    723.1 Neck pain            M54.2    Cervicalgia              Orders:          [id number]    Therapeutic injection  (In-House)                                           Toradol, per 15 mg (x2)         724.2 Lower back pain            M54.5    Low back pain    715.00 Generalized osteoarthritis, site unspecified            M15.0    Primary generalized (osteo)arthritis           M15.9    Polyosteoarthritis, unspecified    272.8 Lipomatosis            E88.2    Lipomatosis, not elsewhere classified    784.7 Epistaxis            R04.0    Epistaxis

## 2021-05-18 NOTE — PROGRESS NOTES
Daisy CesarAris 1960     Office/Outpatient Visit    Visit Date: Thu, Aug 1, 2019 02:14 pm    Provider: Elise Cueto MD (Assistant: Destini Otero MA)    Location: [facility]        Electronically signed by Elise Cueto MD on  08/01/2019 03:32:04 PM                             SUBJECTIVE:        CC:     Cristal is a 59 year old White female.  Patient presents today for three month follow up;         HPI: Cristal is here for routine follow-up on chronic issues.          She continues to struggle with refractory migraine headaches.  She is on meloxicam, amitriptyline, Migranal, and tizanidine.  She has been on Lyrica but stopped because it caused blurry vision.  Flector patches have worked well for her but insurance has refused to cover these.  She has found some relief from topical diclofenac gel applied to the back of the neck.  Maxalt and Imitrex were too sedating.  Naratriptan increased her BP.  Zonisamide caused stomach pain and diarrhea.  Topiramate worsened her BPPV.  She is following with Dr. Pierre for occipital nerve blocks.  She is still getting those and they do help in general.  She feels like her skin lesions in the neck are worsening her headache pain.  She saw Dr. Quispe who told her that her lesions are not lipomas, as they have been previously diagnosed.          She is on levothyroxine for hypothyroidism.  No heat/cold intolerance, diarrhea or constipation, changes in hair or skin.  Labs are UTD.        She is on pravastatin for HLD.  No myalgias or other adverse effects.        She is on omeprazole for GERD.  No indigestion or reflux.  She is on dicyclomine for IBS and abdominal cramping.     ROS:     CONSTITUTIONAL:  Positive for fatigue.   Negative for fever.      EYES:  Negative for blurred vision.      E/N/T:  Positive for tinnitus.   Negative for diminished hearing or nasal congestion.      CARDIOVASCULAR:  Negative for chest pain, orthopnea, palpitations,  paroxysmal nocturnal dyspnea, pedal edema and tachycardia.      RESPIRATORY:  Negative for recent cough and dyspnea.      GASTROINTESTINAL:  Positive for constipation and nausea ( with migraines ).   Negative for abdominal pain, diarrhea or vomiting.      GENITOURINARY:  Negative for dysuria and urinary incontinence.      MUSCULOSKELETAL:  Positive for arthralgias and back pain.   Negative for myalgias.      INTEGUMENTARY/BREAST:  Positive for lipomatosis; tender.      NEUROLOGICAL:  Positive for headaches, paresthesia ( bilateral upper extremity; bilateral lower extremity ) and weakness ( bilateral upper extremity ).   Negative for dizziness or vertigo (resolved s/p Epley maneuvers with ENT).          PMH/FMH/SH:     Last Reviewed on 8/01/2019 02:40 PM by Elise Cueto    Past Medical History:                 PAST MEDICAL HISTORY         Hypertension     Asthma     Gastroesophageal Reflux Disease    Irritable Bowel Syndrome     Migraine Headaches         PREVENTIVE HEALTH MAINTENANCE             BONE DENSITY: was last done 3/7/2018 with the following abnormality noted-- osteopenia     COLORECTAL CANCER SCREENING: colonoscopy with normal results; 2000     Hepatitis C Medicare Screening: was last done 12/2017     MAMMOGRAM: Done within last 2 years and results in are chart was last done 3/11/2019 with normal results     PAP SMEAR: No longer indicated due to age and history         PAST MEDICAL HISTORY             CURRENT MEDICAL PROVIDERS:    Primary care provider ( Dr. Cueto )    Cardiologist: Dr. Amezcua    E/N/T: Dr. Lomeli    Neurologist: Dr. Pierre         Surgical History:         Hysterectomy: for endometriosis;     Tonsillectomy      Cholecystectomy    lipoma removal;      C4-C5 and C5-C6 fusion;    R and L carpal tunnel release;    R ulnar nerve release;    R knee surgery;         Family History:     Father: Hypertension;  bladder     Brother(s): Hepatic Carcinoma     Sister(s): thyroid CA      Paternal Grandfather: Coronary Artery Disease     Maternal Grandmother: Breast Cancer         Social History:     Occupation:    Disabled. formerly in hospital administration;     Marital Status: Single     Children: None         Tobacco/Alcohol/Supplements:     Last Reviewed on 8/01/2019 02:40 PM by Elise Cueto    Tobacco: She has never smoked.          Substance Abuse History:     Last Reviewed on 8/01/2019 02:40 PM by Elise Cueto        Mental Health History:     Last Reviewed on 8/01/2019 02:40 PM by Elise Cueto        Communicable Diseases (eg STDs):     Last Reviewed on 8/01/2019 02:40 PM by Elise Cueto            Current Problems:     Last Reviewed on 5/01/2019 01:38 PM by Elise Cueto    Generalized osteoarthritis, site unspecified     Lower back pain     Screening for cancer of colon     Thyroid nodule     Migraine, unspecified, without mention of intractable migraine without mention of status migrainosus     Esophageal stricture     Osteopenia     Use of high risk medications     Hypothyroidism     Hyperlipidemia     Classic migraine     Upper back pain     Dizziness     Rosacea     Lipomatosis     Asthma     Essential hypertension     Irritable bowel syndrome     GERD     Chronic low back pain     BPPV         Immunizations:     None        Allergies:     Last Reviewed on 5/01/2019 01:38 PM by Elise Cueto    Penicillins:    Morphine Sulfate: migraine headache    Topiramate: (Adverse Reaction)    Zonisamide: diarrhea, stomach pain (Adverse Reaction)    Zofran ODT: palpitations (Adverse Reaction)    Zofran ODT: palpitations (Adverse Reaction)        Current Medications:     Last Reviewed on 5/01/2019 01:38 PM by Elise Cueto    Amitriptyline HCl 100mg Tablet Take 1 tablet(s) by mouth at bedtime     Migranal 4mg/1ml Nasal Spray 1 spray in each nostril, repeat in 15min, no more than 6 sprays/day & 8 sprays/week     Tizanidine HCl 4mg Capsules QID     Omeprazole 40mg Capsules,  Extended Release 1 capsule daily     Levoxyl 0.05mg Tablet Take 1 tablet(s) by mouth daily     Meloxicam 15mg Tablet 1 tab daily     Pravastatin 40mg Tablet 1 tab daily     Metronidazole 0.75% Cream Apply thin film to affected area am and pm     Azelastine HCl 0.1% Nasal Spray 1 spray each nostril BID     Voltaren  1% Topical Gel Apply 4 g to affected area up to 4 times a day     Dicyclomine HCl 10mg Capsules Take 1 capsule(s) by mouth bid prn cramping     Promethazine HCl 25mg Tablet 1 PO Q 8 Hrs PRN Nausea     Ventolin HFA 90mcg/1actuation Oral Inhaler 1 puff inhaled every 4-6 hours prn SOB/wheezing     Acetaminophen 500mg Tablet 1 TAB PO TID     Caltrate 600 + D 600mg/800IU Tablet Take 1 tablet(s) by mouth daily     Diphenhydramine HCl 25mg Capsules 1 capsule q am daily     Vitamin D 500mg daily         OBJECTIVE:        Vitals:         Current: 8/1/2019 2:18:19 PM    Ht:  5 ft, 4 in;  Wt: 202 lbs;  BMI: 34.7    T: 98.4 F (oral);  BP: 134/67 mm Hg (right arm, sitting);  P: 100 bpm (right arm (BP Cuff), sitting);  sCr: 0.65 mg/dL;  GFR: 103.76        Exams:     PHYSICAL EXAM:     GENERAL: vital signs recorded - well developed, well nourished;  well groomed;  no apparent distress;     EYES: extraocular movements intact; conjunctiva and cornea are normal; PERRLA;     E/N/T: OROPHARYNX:  normal mucosa, dentition, gingiva, and posterior pharynx;     RESPIRATORY: normal respiratory rate and pattern with no distress; normal breath sounds with no rales, rhonchi, wheezes or rubs;     CARDIOVASCULAR: normal rate; rhythm is regular;  no systolic murmur; no edema;     GASTROINTESTINAL: nontender; normal bowel sounds;     MUSCULOSKELETAL: normal gait; normal overall tone         Procedures:     Classic migraine     1. Toradol 30 mg given IM in the left hip; administered by AS;  lot number [id number]; expires 08/2020             ASSESSMENT           346.00   G43.009  Classic migraine              DDx:     272.4   E78.2   Hyperlipidemia              DDx:     244.9   E03.9  Hypothyroidism              DDx:     272.8   E88.2  Lipomatosis              DDx:         ORDERS:         Meds Prescribed:       Refill of: Dicyclomine HCl 10mg Capsules Take 1 capsule(s) by mouth bid prn cramping  #60 (Sixty) capsule(s) Refills: 1       Refill of: Promethazine HCl 25mg Tablet 1 PO Q 8 Hrs PRN Nausea  #30 (Thirty) tablet(s) Refills: 1         Lab Orders:       APPTO  Appointment need  (In-House)         [id number]  TSH - [facility] TSH  (Send-Out)         21300  HTNLP - [facility] CMP AND LIPID: [id number], [id number]  (Send-Out)           Other Orders:       [id number]  Therapeutic injection  (In-House)           Toradol, per 15 mg (x2)                 PLAN:          Classic migraine Stable.  Migraines still give her a lot of problems.  She follows [facility] Dr. Pierre who does occipital nerve blocks for her, and those do help.  Continue current meds.  No refills needed.  Toradol shot given today.  RTC 3 months.     Narcotic or pain medication Toradol 30 mg     FOLLOW-UP: Schedule a follow-up visit in 3 months..  f/u migraine with [facility]           Prescriptions:       Refill of: Promethazine HCl 25mg Tablet 1 PO Q 8 Hrs PRN Nausea  #30 (Thirty) tablet(s) Refills: 1           Orders:       APPTO  Appointment need  (In-House)         78699  Therapeutic injection  (In-House)                     Toradol, per 15 mg (x2)          Hyperlipidemia     LABORATORY:  Labs ordered to be performed today include HTN/Lipid Panel: CMP, Lipid.            Orders:       [id number]  HTN - [facility] CMP AND LIPID: [id number], [id number]  (Send-Out)            Hypothyroidism Mild cold intolerance.  Checking labs.     LABORATORY:  Labs ordered to be performed today include TSH.            Orders:       [id number]  TSH - [facility] TSH  (Send-Out)            Lipomatosis ?Dx of lipomatosis.  Dr. Peterson diagnosed her with lipomatosis 20 years ago.  Dr. Quispe recently evaluated the lesion  on her neck and did not feel that the  lesion was in fact a lipoma.  A CT scan was obtained and did not show any masses at all.  She has had a couple of masses removed in the past; does not recall  what dx was given then.  We may consider sending her to General Surgery to see if anyone who consider biopsy since clearly there is a mass present.  She feels it impacts her migraines negatively.             Other Prescriptions:       Refill of: Dicyclomine HCl 10mg Capsules Take 1 capsule(s) by mouth bid prn cramping  #60 (Sixty) capsule(s) Refills: 1         Patient Recommendations:        For  Classic migraine:     Schedule a follow-up visit in 3 months.                APPOINTMENT INFORMATION:        Monday Tuesday Wednesday Thursday Friday Saturday Sunday            Time:___________________AM  PM   Date:_____________________             CHARGE CAPTURE           **Please note: ICD descriptions below are intended for billing purposes only and may not represent clinical diagnoses**        Primary Diagnosis:         346.00 Classic migraine            G43.009    Migraine without aura, not intractable, without status migrainosus              Orders:          [id number]   Office/outpatient visit; established patient, level 4  (In-House)             APPTO   Appointment need  (In-House)             20402   Therapeutic injection  (In-House)                                           Toradol, per 15 mg (x2)         272.4 Hyperlipidemia            E78.2    Mixed hyperlipidemia    244.9 Hypothyroidism            E03.9    Hypothyroidism, unspecified    272.8 Lipomatosis            E88.2    Lipomatosis, not elsewhere classified

## 2021-05-18 NOTE — PROGRESS NOTES
Daisy Cesar  1960     Office/Outpatient Visit    Visit Date: Tue, May 5, 2020 09:30 am    Provider: Elise Cueto MD (Assistant: Destini Otero MA)    Location: [facility]        Electronically signed by Elise Cueto MD on  05/05/2020 10:21:52 AM                             Subjective:        CC: Cristal is a 60 year old White female.  Three month follow up ([facility] AUDIO CALL );         HPI: Cristal's telehealth visit today is for follow-up on chronic issues.          She continues to struggle with refractory migraine headaches.  She is on meloxicam, amitriptyline, Migranal, and tizanidine.  She only uses the Migranal when the headaches are frontal.  She has been on Lyrica but stopped because it caused blurry vision.  Flector patches have worked well for her but insurance has refused to cover these.  She has found some relief from topical diclofenac gel applied to the back of the neck.  Maxalt and Imitrex were too sedating.  Naratriptan increased her BP.  Zonisamide caused stomach pain and diarrhea.  Topiramate worsened her BPPV.  She is following with Dr. Pierre for occipital nerve blocks.  Those worked really well for her when she first started them.  However, she has started to have less relief with them.  She used to feel good for about a month after, then the pain returns.  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  However, Dr. Quispe evaluated her and told her that these are not in fact lipomas.  However, she is not sure what they are.  She has noticed that her headaches are coming daily or every other day for about the past month.          She is on levothyroxine for hypothyroidism.  No heat/cold intolerance, changes in hair or skin.        She is on pravastatin for HLD.  No myalgias or adverse effects.        She is on omeprazole for GERD.  No reflux or indigestion.  She is on dicyclomine for IBS and  abdominal cramping.    ROS:     CONSTITUTIONAL:  Positive for fatigue.   Negative for fever.      EYES:  Negative for blurred vision.      E/N/T:  Positive for tinnitus.   Negative for diminished hearing or nasal congestion.      CARDIOVASCULAR:  Negative for chest pain, orthopnea, palpitations, paroxysmal nocturnal dyspnea, pedal edema and tachycardia.      RESPIRATORY:  Negative for recent cough and dyspnea.      GASTROINTESTINAL:  Positive for constipation and nausea.   Negative for abdominal pain, diarrhea or vomiting.      MUSCULOSKELETAL:  Positive for arthralgias and back pain.   Negative for myalgias.      INTEGUMENTARY/BREAST:  Positive for lipomatosis; tender.      NEUROLOGICAL:  Positive for headaches, paresthesia ( bilateral upper extremity; bilateral lower extremity ), vertigo and (x2 spells) weakness ( bilateral upper extremity ).   Negative for dizziness.          Past Medical History / Family History / Social History:         Last Reviewed on 5/05/2020 10:02 AM by Elise Cueto    Past Medical History:                 PAST MEDICAL HISTORY         Hypertension     Asthma     Gastroesophageal Reflux Disease    Irritable Bowel Syndrome     Migraine Headaches         PREVENTIVE HEALTH MAINTENANCE             BONE DENSITY: was last done 3/7/2018 with the following abnormality noted-- osteopenia     COLORECTAL CANCER SCREENING: colonoscopy with normal results; 2000     Hepatitis C Medicare Screening: was last done 12/2017     MAMMOGRAM: Done within last 2 years and results in are chart was last done 3/11/2019 with normal results     PAP SMEAR: No longer indicated due to age and history         PAST MEDICAL HISTORY             CURRENT MEDICAL PROVIDERS:    Primary care provider ( Dr. Cueto )    Cardiologist: Dr. Amezcua    E/N/T: Dr. Lomeli    Neurologist: Dr. Pierre         Surgical History:         Hysterectomy: for endometriosis;     Tonsillectomy     Cholecystectomy    lipoma removal;     C4-C5  and C5-C6 fusion;    R and L carpal tunnel release;    R ulnar nerve release;    R knee surgery;         Family History:     Father: Hypertension;  bladder     Brother(s): Hepatic Carcinoma     Sister(s): thyroid CA     Paternal Grandfather: Coronary Artery Disease     Maternal Grandmother: Breast Cancer         Social History:     Occupation:    Disabled. formerly in hospital administration;     Marital Status: Single     Children: None         Tobacco/Alcohol/Supplements:     Last Reviewed on 5/05/2020 10:02 AM by Elise Cueto    Tobacco: She has never smoked.          Substance Abuse History:     Last Reviewed on 5/05/2020 10:02 AM by Elise Cueto        Mental Health History:     Last Reviewed on 5/05/2020 10:02 AM by Elise Cueto        Communicable Diseases (eg STDs):     Last Reviewed on 5/05/2020 10:02 AM by Elise Cueto        Current Problems:     Last Reviewed on 2/05/2020 10:45 AM by Elise Cueto    HTN - Essential (primary) hypertension    Asthma - Unspecified asthma, uncomplicated    GERD - Gastro-esophageal reflux disease without esophagitis    Low back pain    IBS - Irritable bowel syndrome with diarrhea    Lipomatosis, not elsewhere classified    Rosacea, unspecified    Upper back pain - Pain in thoracic spine    HLD - Mixed hyperlipidemia    Migraine without aura, not intractable, without status migrainosus    Hypothyroidism, unspecified    BPPV - Benign paroxysmal vertigo, left ear    Esophageal stricture - Esophageal obstruction    Osteopenia - Other specified disorders of bone density and structure, other site    Other long term (current) drug therapy    Nontoxic single thyroid nodule    Primary generalized (osteo)arthritis    Encounter for screening for depression    Encounter for general adult medical examination without abnormal findings    Encounter for screening for malignant neoplasm of colon    Encounter for other screening for malignant neoplasm of breast         Immunizations:     None        Allergies:     Last Reviewed on 5/05/2020 09:36 AM by Destini Otero    Penicillins:      Morphine Sulfate: migraine headache     Topiramate:   (Adverse Reaction)    Zonisamide: diarrhea,Stomach pain  (Adverse Reaction)    Zofran ODT: palpitations  (Adverse Reaction)        Current Medications:     Last Reviewed on 5/05/2020 09:36 AM by Destini Otero    meloxicam 15 mg oral tablet [1 tab daily]    Migranal 0.5 mg/pump act. (4 mg/mL) Intranasal Spray, Non-Aerosol [1 spray in each nostril, repeat in 15min, no more than 6 sprays/day & 8 sprays/week]    tiZANidine 4 mg oral capsule [QID]    amitriptyline 100 mg oral tablet [Take 1 tablet(s) by mouth at bedtime]    LevoxyL 50 mcg oral tablet [Take 1 tablet(s) by mouth daily]    Vitamin D 500mg daily     Caltrate with Vitamin D3 600 mg(1,500mg) -800 unit oral tablet [Take 1 tablet(s) by mouth daily]    Acetaminophen 500 mg oral tablet [1 TAB PO TID ]    pravastatin 40 mg oral tablet [1 tab daily]    Ventolin HFA 90mcg/1actuation Oral Inhaler [1 puff inhaled every 4-6 hours prn SOB/wheezing]    Voltaren  1 % Topical Gel [Apply 4 g to affected area up to 4 times a day ]    promethazine 25 mg oral tablet [1 PO Q 8 Hrs PRN Nausea]    Metronidazole 0.75 % Topical Cream [Apply thin film to affected area am and pm]    omeprazole 40 mg oral capsule,delayed release (enteric coated) [1 capsule daily]    dicyclomine 10 mg oral capsule [Take 1 capsule(s) by mouth bid prn cramping]        Assessment:         G43.009   Migraine without aura, not intractable, without status migrainosus       E78.2   HLD - Mixed hyperlipidemia       E03.9   Hypothyroidism, unspecified       K21.9   GERD - Gastro-esophageal reflux disease without esophagitis           ORDERS:         Meds Prescribed:       [Refilled] Ventolin HFA 90 mcg/actuation Inhalation HFA Aerosol Inhaler [1 puff inhaled every 4-6 hours prn SOB/wheezing], #18 (eighteen) grams, Refills: 5 (five)          Lab Orders:       APPTO  Appointment need  (In-House)              Other Orders:         Screening mammogram results documented  (Send-Out)                      Plan:         Migraine without aura, not intractable, without status migrainosusShe continues to struggle with refractory migraine headaches.  She is on meloxicam, amitriptyline, Migranal, and tizanidine.  No refills needed.  She is still following with Dr. Pierre for occipital nerve blocks.  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  However, Dr. Quispe evaluated her and told her that these are not in fact lipomas.  Since we cannot get a good idea of what they are, we may consider doing a referral to Derm in the future to further characterize these lesions.  RTC 3 months.    MIPS Vaccines Flu and Pneumonia updated in Shot record Screening mammomgram done within last 2 years and results in are chart Telehealth: Verbal consent obtained for visit to occur via phone call; Staff, other than provider, present during telephone visit include Destini Otero MA; Total time spent was 16 minutes; 14079--Ngvaybtsu E/M 11-20 minutes     FOLLOW-UP: Schedule a follow-up visit in 3 months.:.  f/u migraines with [facility]          Prescriptions:       [Refilled] Ventolin HFA 90 mcg/actuation Inhalation HFA Aerosol Inhaler [1 puff inhaled every 4-6 hours prn SOB/wheezing], #18 (eighteen) grams, Refills: 5 (five)           Orders:       APPTO  Appointment need  (In-House)              Screening mammogram results documented  (Send-Out)              HLD - Mixed hyperlipidemiaStable.  Labs deferred.  No refills needed.        Hypothyroidism, unspecifiedStable.  Labs deferred.  No refills needed.        GERD - Gastro-esophageal reflux disease without esophagitisStable.  No refills needed.            Patient Recommendations:        For  Migraine without aura, not intractable, without status migrainosus:     Schedule a follow-up visit in 3 months.                APPOINTMENT INFORMATION:        Monday Tuesday Wednesday Thursday Friday Saturday Sunday            Time:___________________AM  PM   Date:_____________________             Charge Capture:         Primary Diagnosis:     G43.009  Migraine without aura, not intractable, without status migrainosus           Orders:      APPTO  Appointment need  (In-House)            [id number]  Phys/QHP telephone evaluation 11-20 minutes  (In-House)              E78.2  HLD - Mixed hyperlipidemia     E03.9  Hypothyroidism, unspecified     K21.9  GERD - Gastro-esophageal reflux disease without esophagitis

## 2021-05-18 NOTE — PROGRESS NOTES
Daisy Cesar  1960     Office/Outpatient Visit    Visit Date: Wed, Feb 5, 2020 10:27 am    Provider: Elise Cueto MD (Assistant: Tati Abarca MA)    Location: [facility]        Electronically signed by Elise Cueto MD on  02/05/2020 12:13:52 PM                             Subjective:        CC: Cristal is a 60 year old White female.  MCW; PT STATES SHE ISNT TAKING BANOPHEN         HPI:       She is due for colonoscopy, last done 2010 and this was normal.  Pap smear is no longer indicated by history; s/p hysterectomy.  She is UTD on mammogram, last done 3/2019 and this was normal.  She is UTD on DEXA, last done 3/2018 and this showed osteopenia.  She is due for Shingrix, Td and flu.  She is due for routine labs including HTN panel and TSH.        Dr. Pierre started her on Aimovig injections but it made her migraines worse and she had terrible nausea with it.  She is still doing the occipital nerve blocks with Dr. Pierre, now every 2 months.    Cristal is here for a Medicare wellness visit.          Self-Assessment of Health: She rates her health as fair. She rates her confidence of being able to control/manage most of her health problems as somewhat confident. Her physical/emotional health has limited her social activites slightly.  A review of cognitive impairment was performed, including ability to drive a car, manage finances, and any memory changes, and was found to be negative.  A review of functional ability, including bathing, dressing, walking, and urine/bowel continence as well as level of safety was performed and was found to be negative.  Falls Risk: Has not had any falls or only one fall without injury in the past year.  In regard to hearing, she reports having to strain to hear or understand conversations, but not having trouble hearing the TV/radio when others do not or wearing hearing aid(s).  Concerning home safety, she reports that at home she DOES have adequate  "lighting, a skid resistant shower/tub, grab bars in the bath, functioning smoke alarms and absence of throw rugs, but not handrails on stairs.  Physical Activity: She exercises for at least 20 minutes 3 or more days/week.; Type of diet patient normally eats is described as poor--needs improvement.  Tobacco: She has never smoked.  Preventative Health updated today           PHQ-9 Depression Screening: Completed form scanned and in chart; Total Score 2     ROS:     CONSTITUTIONAL:  Positive for fatigue.   Negative for fever.      EYES:  Negative for blurred vision.      E/N/T:  Positive for tinnitus.   Negative for diminished hearing or nasal congestion.      CARDIOVASCULAR:  Negative for chest pain, orthopnea, palpitations, paroxysmal nocturnal dyspnea, pedal edema and tachycardia.      RESPIRATORY:  Negative for recent cough and dyspnea.      GASTROINTESTINAL:  Positive for constipation and nausea.   Negative for abdominal pain, diarrhea or vomiting.      GENITOURINARY:  Negative for dysuria and urinary incontinence.      MUSCULOSKELETAL:  Positive for arthralgias, back pain and R shoulder \"popped\" 2 months ago when overhead reaching and now having pain with restricted abduction.   Negative for myalgias.      INTEGUMENTARY/BREAST:  Positive for lipomatosis; tender.      NEUROLOGICAL:  Positive for headaches, paresthesia ( bilateral upper extremity; bilateral lower extremity ) and weakness ( bilateral upper extremity ).   Negative for dizziness.          Past Medical History / Family History / Social History:         Last Reviewed on 2/05/2020 10:45 AM by Elise Cueto    Past Medical History:                 PAST MEDICAL HISTORY         Hypertension     Asthma     Gastroesophageal Reflux Disease    Irritable Bowel Syndrome     Migraine Headaches         PREVENTIVE HEALTH MAINTENANCE             BONE DENSITY: was last done 3/7/2018 with the following abnormality noted-- osteopenia     COLORECTAL CANCER SCREENING: " colonoscopy with normal results; 2000     Hepatitis C Medicare Screening: was last done 12/2017     MAMMOGRAM: Done within last 2 years and results in are chart was last done 3/11/2019 with normal results     PAP SMEAR: No longer indicated due to age and history         PAST MEDICAL HISTORY             CURRENT MEDICAL PROVIDERS:    Primary care provider ( Dr. Cueto )    Cardiologist: Dr. Amezcua    E/N/T: Dr. Lomeli    Neurologist: Dr. Pierre         Surgical History:         Hysterectomy: for endometriosis;     Tonsillectomy     Cholecystectomy    lipoma removal;     C4-C5 and C5-C6 fusion;    R and L carpal tunnel release;    R ulnar nerve release;    R knee surgery;         Family History:     Father: Hypertension;  bladder     Brother(s): Hepatic Carcinoma     Sister(s): thyroid CA     Paternal Grandfather: Coronary Artery Disease     Maternal Grandmother: Breast Cancer         Social History:     Occupation:    Disabled. formerly in hospital administration;     Marital Status: Single     Children: None         Tobacco/Alcohol/Supplements:     Last Reviewed on 2/05/2020 10:45 AM by Elise Cueto    Tobacco: She has never smoked.          Substance Abuse History:     Last Reviewed on 2/05/2020 10:45 AM by Elise Cueto        Mental Health History:     Last Reviewed on 2/05/2020 10:45 AM by Elise Cueto        Communicable Diseases (eg STDs):     Last Reviewed on 2/05/2020 10:45 AM by Elise Cueto        Current Problems:     Last Reviewed on 11/04/2019 02:35 PM by Elise Cueto    Essential hypertension    Asthma    Irritable bowel syndrome    Essential (primary) hypertension    Unspecified asthma, uncomplicated    Gastro-esophageal reflux disease without esophagitis    Irritable bowel syndrome with diarrhea    Low back pain    Chronic low back pain    GERD    Lipomatosis, not elsewhere classified    Lipomatosis    Rosacea    Rosacea, unspecified    Pain in thoracic spine    Dizziness and  giddiness    Dizziness    Upper back pain    Classic migraine    Migraine without aura, not intractable, without status migrainosus    Mixed hyperlipidemia    Hyperlipidemia    Hypothyroidism    Hypothyroidism, unspecified    Benign paroxysmal vertigo, left ear    BPPV    Use of high risk medications    Other long term (current) drug therapy    Osteopenia    Other specified disorders of bone density and structure, other site    Esophageal stricture    Esophageal obstruction    Migraine, unspecified, without mention of intractable migraine without mention of status migrainosus    Migraine, unspecified, not intractable, without status migrainosus    Nontoxic single thyroid nodule    Thyroid nodule    Encounter for screening for malignant neoplasm of colon    Primary generalized (osteo)arthritis    Polyosteoarthritis, unspecified    Screening for cancer of colon    Generalized osteoarthritis, site unspecified    Lower back pain        Immunizations:     None        Allergies:     Last Reviewed on 2/05/2020 10:27 AM by Tati Abarca    Penicillins:      Morphine Sulfate: migraine headache     Topiramate:   (Adverse Reaction)    Zonisamide: diarrhea,Stomach pain  (Adverse Reaction)    Zofran ODT: palpitations  (Adverse Reaction)    Zofran ODT: palpitations  (Adverse Reaction)        Current Medications:     Last Reviewed on 2/05/2020 10:27 AM by Tati Abarca    Prilosec 40 mg oral capsule,delayed release (enteric coated) [1 tab daily]    meloxicam 15 mg oral tablet [1 tab daily]    Migranal 0.5 mg/pump act. (4 mg/mL) Intranasal Spray, Non-Aerosol [1 spray in each nostril, repeat in 15min, no more than 6 sprays/day & 8 sprays/week]    tiZANidine 4 mg oral capsule [QID]    amitriptyline 100 mg oral tablet [Take 1 tablet(s) by mouth at bedtime]    Levoxyl 50 mcg oral tablet [Take 1 tablet(s) by mouth daily]    Azelastine HCl 0.1% Nasal Spray [1 spray each nostril BID]    Vitamin D 500mg daily     Caltrate with Vitamin D3  600 mg(1,500mg) -800 unit oral tablet [Take 1 tablet(s) by mouth daily]    Banophen 25 mg oral capsule [1 capsule q am daily]    Acetaminophen 500 mg oral tablet [1 TAB PO TID ]    pravastatin 40 mg oral tablet [1 tab daily]    Ventolin HFA 90mcg/1actuation Oral Inhaler [1 puff inhaled every 4-6 hours prn SOB/wheezing]    Voltaren  1 % Topical Gel [Apply 4 g to affected area up to 4 times a day ]    promethazine 25 mg oral tablet [1 PO Q 8 Hrs PRN Nausea]    Metronidazole 0.75 % Topical Cream [Apply thin film to affected area am and pm]    Metronidazole 0.75 % Topical Cream [Apply thin film to affected area am and pm]    omeprazole 40 mg oral capsule,delayed release (enteric coated) [1 capsule daily]    dicyclomine 10 mg oral capsule [Take 1 capsule(s) by mouth bid prn cramping]        Objective:        Vitals:         Current: 2/5/2020 10:36:42 AM    Ht:  5 ft, 4 in;  Wt: 205 lbs;  BMI: 35.2T: 98.2 F (oral);  BP: 124/71 mm Hg (left arm, sitting);  P: 98 bpm (left arm (BP Cuff), sitting);  sCr: 0.69 mg/dL;  GFR: 97.17VA: 20/25 OD, 20/25 OS (without correction)        Exams:     PHYSICAL EXAM:     GENERAL: vital signs recorded - well developed, well nourished;  well groomed;  no apparent distress;     EYES: extraocular movements intact; conjunctiva and cornea are normal; PERRLA;     E/N/T: OROPHARYNX:  normal mucosa, dentition, gingiva, and posterior pharynx;     RESPIRATORY: normal respiratory rate and pattern with no distress; normal breath sounds with no rales, rhonchi, wheezes or rubs;     CARDIOVASCULAR: normal rate; rhythm is regular;  no systolic murmur; no edema;     GASTROINTESTINAL: nontender; normal bowel sounds;     MUSCULOSKELETAL: normal gait; normal overall tone     NEUROLOGIC: mental status: alert and oriented x 3; Reflexes: brachioradialis: 2+; knee jerks: 2+;     PSYCHIATRIC: appropriate affect and demeanor; normal psychomotor function; normal speech pattern;         Procedures:     Migraine without  aura, not intractable, without status migrainosus    1. Toradol 30 mg given IM in the left hip; administered by ael;  lot number [id number]; expires 05/2020             Assessment:         Z00.00   Encounter for general adult medical examination without abnormal findings       Z13.31   Encounter for screening for depression       G43.009   Migraine without aura, not intractable, without status migrainosus       Z12.11   Encounter for screening for malignant neoplasm of colon       I10   HTN - Essential (primary) hypertension       E78.2   HLD - Mixed hyperlipidemia       E03.9   Hypothyroidism, unspecified           ORDERS:         Lab Orders:       APPTO  Appointment need  (In-House)            95277  TSH - [facility] TSH  (Send-Out)            51154  HTNLP - [facility] CMP AND LIPID: [id number], [id number]  (Send-Out)              Procedures Ordered:         Annual wellness visit, includes a PPPS, subsequent visit  (In-House)              Other Orders:         Depression screen negative  (In-House)            1101F  Pt screen for fall risk; document no falls in past year or only 1 fall w/o injury in past year (HUANG)  (In-House)              Screening mammogram results documented  (Send-Out)            1124F  Advance Care Planning discussed and doc in MR; no surrogate named or advance care plan provided  (Send-Out)            18875  Therapeutic injection  (In-House)              Toradol, per 15 mg  (x2)                  Plan:         Encounter for general adult medical examination without abnormal findingsShe is due for colonoscopy, last done 2000 and this was normal.  She is already scheduled to go see Luis Carroll in  March.  Pap smear is no longer indicated by history; s/p hysterectomy.  She is UTD on mammogram, last done 3/2019 and this was normal.  She is UTD on DEXA, last done 3/2018 and this showed osteopenia.  She is due for Shingrix, Td and flu; declines all vaccines.  She is due for routine labs including HTN  panel and TSH; ordered.  No fall risk, no memory issues, no signs/symptoms of depression.  She lives alone.  She is able to drive and perform ADLs/manage finances independently.  Hearing is adequate.  She has a living will.  Preventive services handout and safety handout were given to her.  Current doctor list updated.  RTC 3 months.    MIPS PHQ-9 Depression Screening: Completed form scanned and in chart; Total Score 2; Negative Depression Screen ADVANCED DIRECTIVES: None         FOLLOW-UP: Schedule a follow-up visit in 3 months.:.  f/u migraines with [place]          Orders:         Annual wellness visit, includes a PPPS, subsequent visit  (In-House)              Depression screen negative  (In-House)            1101F  Pt screen for fall risk; document no falls in past year or only 1 fall w/o injury in past year (HUANG)  (In-House)              Screening mammogram results documented  (Send-Out)            1124F  Advance Care Planning discussed and doc in MR; no surrogate named or advance care plan provided  (Send-Out)            APPTO  Appointment need  (In-House)              Migraine without aura, not intractable, without status migrainosusFollowing with Dr. Pierre.  Getting occipital nerve blocks.    Narcotic or pain medication Toradol 30 mg           Orders:       51031  Therapeutic injection  (In-House)              Toradol, per 15 mg  (x2)          Encounter for screening for malignant neoplasm of colonAlready has appt with Luis Carroll for March to evaluate IBS as well as screening colonoscopy.        HTN - Essential (primary) hypertension    LABORATORY:  Labs ordered to be performed today include HTN/Lipid Panel: CMP, Lipid.            Orders:       [id number]  HTNLP - [facility] CMP AND LIPID: [id number], [id number]  (Send-Out)              HLD - Mixed hyperlipidemiaStable.  Checking labs.        Hypothyroidism, unspecified    LABORATORY:  Labs ordered to be performed today include TSH.            Orders:        [id number]  [facility] TSH  (Send-Out)                  Patient Recommendations:        For  Encounter for general adult medical examination without abnormal findings:    Schedule a follow-up visit in 3 months.                APPOINTMENT INFORMATION:        Monday Tuesday Wednesday Thursday Friday Saturday Sunday            Time:___________________AM  PM   Date:_____________________             Charge Capture:         Primary Diagnosis:     Z00.00  Encounter for general adult medical examination without abnormal findings           Orders:        Annual wellness visit, includes a PPPS, subsequent visit  (In-House)              Depression screen negative  (In-House)            1101F  Pt screen for fall risk; document no falls in past year or only 1 fall w/o injury in past year (HUANG)  (In-House)            APPTO  Appointment need  (In-House)              Z13.31  Encounter for screening for depression     G43.009  Migraine without aura, not intractable, without status migrainosus           Orders:      [id number]  Therapeutic injection  (In-House)              Toradol, per 15 mg  (x2)          Z12.11  Encounter for screening for malignant neoplasm of colon     I10  HTN - Essential (primary) hypertension     E78.2  HLD - Mixed hyperlipidemia     E03.9  Hypothyroidism, unspecified

## 2021-05-18 NOTE — PROGRESS NOTES
Daisy CesarAris 1960     Office/Outpatient Visit    Visit Date: Mon, Nov 4, 2019 02:12 pm    Provider: Elise Cueto MD (Assistant: Destini Otero MA)    Location: [facility]        Electronically signed by Elise Cueto MD on  11/04/2019 04:47:35 PM                             SUBJECTIVE:        CC:     Cristal is a 59 year old White female.  Patient presents today for three month follow up;         HPI: Cristal is here today to f/u on chronic issues.          She continues to struggle with refractory migraine headaches.  She is on meloxicam, amitriptyline, Migranal, and tizanidine.  She only uses the Migranal when the headaches are frontal.  She has been on Lyrica but stopped because it caused blurry vision.  Flector patches have worked well for her but insurance has refused to cover these.  She has found some relief from topical diclofenac gel applied to the back of the neck.  Maxalt and Imitrex were too sedating.  Naratriptan increased her BP.  Zonisamide caused stomach pain and diarrhea.  Topiramate worsened her BPPV.  She is following with Dr. Pierre for occipital nerve blocks.  Those worked really well for her when she first started them.  However, she has started to have less relief with them.  She used to feel good for about a month after, then the pain returns.  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  However, Dr. Quispe evaluated her and told her that these are not in fact lipomas.  However, she is not sure what they are.        She is on levothyroxine for hypothyroidism.  No heat/cold intolerance, changes in hair or skin.        She is on pravastatin for HLD.  No myalgias.        She is on omeprazole for GERD.  No heartburn or indigestion.  She is on dicyclomine for IBS and abdominal cramping.     ROS:     CONSTITUTIONAL:  Positive for fatigue.   Negative for fever.      EYES:  Negative for blurred vision.      E/N/T:   Positive for tinnitus.   Negative for diminished hearing or nasal congestion.      CARDIOVASCULAR:  Negative for chest pain, orthopnea, palpitations, paroxysmal nocturnal dyspnea, pedal edema and tachycardia.      RESPIRATORY:  Negative for recent cough and dyspnea.      GASTROINTESTINAL:  Positive for constipation and nausea ( with migraines ).   Negative for abdominal pain, diarrhea or vomiting.      GENITOURINARY:  Negative for dysuria and urinary incontinence.      MUSCULOSKELETAL:  Positive for arthralgias and back pain.   Negative for myalgias.      INTEGUMENTARY/BREAST:  Positive for lipomatosis; tender.      NEUROLOGICAL:  Positive for headaches, paresthesia ( bilateral upper extremity; bilateral lower extremity ) and weakness ( bilateral upper extremity ).   Negative for dizziness.          PMH/FMH/SH:     Last Reviewed on 11/04/2019 02:35 PM by Elise Cueto    Past Medical History:                 PAST MEDICAL HISTORY         Hypertension     Asthma     Gastroesophageal Reflux Disease    Irritable Bowel Syndrome     Migraine Headaches         PREVENTIVE HEALTH MAINTENANCE             BONE DENSITY: was last done 3/7/2018 with the following abnormality noted-- osteopenia     COLORECTAL CANCER SCREENING: colonoscopy with normal results; 2000     Hepatitis C Medicare Screening: was last done 12/2017     MAMMOGRAM: Done within last 2 years and results in are chart was last done 3/11/2019 with normal results     PAP SMEAR: No longer indicated due to age and history         PAST MEDICAL HISTORY             CURRENT MEDICAL PROVIDERS:    Primary care provider ( Dr. Cueto )    Cardiologist: Dr. Amezcua    E/N/T: Dr. Lomeli    Neurologist: Dr. Pierre         Surgical History:         Hysterectomy: for endometriosis;     Tonsillectomy      Cholecystectomy    lipoma removal;      C4-C5 and C5-C6 fusion;    R and L carpal tunnel release;    R ulnar nerve release;    R knee surgery;         Family History:      Father: Hypertension;  bladder     Brother(s): Hepatic Carcinoma     Sister(s): thyroid CA     Paternal Grandfather: Coronary Artery Disease     Maternal Grandmother: Breast Cancer         Social History:     Occupation:    Disabled. formerly in hospital administration;     Marital Status: Single     Children: None         Tobacco/Alcohol/Supplements:     Last Reviewed on 11/04/2019 02:35 PM by Elise Cueto    Tobacco: She has never smoked.          Substance Abuse History:     Last Reviewed on 11/04/2019 02:35 PM by Elise Cueto        Mental Health History:     Last Reviewed on 11/04/2019 02:35 PM by Elise Cueto        Communicable Diseases (eg STDs):     Last Reviewed on 11/04/2019 02:35 PM by Elise Cueto            Current Problems:     Last Reviewed on 8/01/2019 02:40 PM by Elise Cueto    Generalized osteoarthritis, site unspecified     Lower back pain     Screening for cancer of colon     Thyroid nodule     Migraine, unspecified, without mention of intractable migraine without mention of status migrainosus     Esophageal stricture     Osteopenia     Use of high risk medications     Hypothyroidism     Hyperlipidemia     Classic migraine     Upper back pain     Dizziness     Rosacea     Lipomatosis     Asthma     Essential hypertension     Irritable bowel syndrome     GERD     Chronic low back pain     BPPV         Immunizations:     None        Allergies:     Last Reviewed on 8/01/2019 02:40 PM by Elise Cueto    Penicillins:    Morphine Sulfate: migraine headache    Topiramate: (Adverse Reaction)    Zonisamide: diarrhea, stomach pain (Adverse Reaction)    Zofran ODT: palpitations (Adverse Reaction)    Zofran ODT: palpitations (Adverse Reaction)        Current Medications:     Last Reviewed on 8/01/2019 02:40 PM by Elise Cueto    Amitriptyline HCl 100mg Tablet Take 1 tablet(s) by mouth at bedtime     Migranal 4mg/1ml Nasal Spray 1 spray in each nostril, repeat in 15min, no more than 6  sprays/day & 8 sprays/week     Tizanidine HCl 4mg Capsules QID     Voltaren  1% Topical Gel Apply 4 g to affected area up to 4 times a day     Levoxyl 0.05mg Tablet Take 1 tablet(s) by mouth daily     Meloxicam 15mg Tablet 1 tab daily     Omeprazole 40mg Capsules, Extended Release 1 capsule daily     Pravastatin 40mg Tablet 1 tab daily     Metronidazole 0.75% Cream Apply thin film to affected area am and pm     Azelastine HCl 0.1% Nasal Spray 1 spray each nostril BID     Ventolin HFA 90mcg/1actuation Oral Inhaler 1 puff inhaled every 4-6 hours prn SOB/wheezing     Dicyclomine HCl 10mg Capsules Take 1 capsule(s) by mouth bid prn cramping     Promethazine HCl 25mg Tablet 1 PO Q 8 Hrs PRN Nausea     Acetaminophen 500mg Tablet 1 TAB PO TID     Caltrate 600 + D 600mg/800IU Tablet Take 1 tablet(s) by mouth daily     Diphenhydramine HCl 25mg Capsules 1 capsule q am daily     Vitamin D 500mg daily         OBJECTIVE:        Vitals:         Current: 11/4/2019 2:16:40 PM    Ht:  5 ft, 4 in;  Wt: 200 lbs;  BMI: 34.3    T: 98.8 F (oral);  BP: 132/78 mm Hg (right arm, sitting);  P: 102 bpm (right arm (BP Cuff), sitting);  sCr: 0.69 mg/dL;  GFR: 97.33        Exams:     PHYSICAL EXAM:     GENERAL: vital signs recorded - well developed, well nourished;  well groomed;  no apparent distress;     EYES: extraocular movements intact; conjunctiva and cornea are normal; PERRLA;     E/N/T: OROPHARYNX:  normal mucosa, dentition, gingiva, and posterior pharynx;     RESPIRATORY: normal respiratory rate and pattern with no distress; normal breath sounds with no rales, rhonchi, wheezes or rubs;     CARDIOVASCULAR: normal rate; rhythm is regular;  no systolic murmur; no edema;     GASTROINTESTINAL: nontender; normal bowel sounds;     MUSCULOSKELETAL: normal gait; normal overall tone         Procedures:     Classic migraine     1. Toradol 30 mg given IM in the left hip; administered by AS;  lot number [id number]; expires 05/2020              ASSESSMENT           346.00   G43.009  Classic migraine              DDx:     401   I10  Essential hypertension              DDx:     272.4   E78.2  Hyperlipidemia              DDx:     244.9   E03.9  Hypothyroidism              DDx:     530.81   K21.9  GERD              DDx:     719.41   M25.511   M25.512  Shoulder pain              DDx:     719.46   M25.561   M25.562  Knee pain              DDx:     564.1   K58.0  Irritable bowel syndrome              DDx:         ORDERS:         Radiology/Test Orders:       3014F  Screening mammography results documented and reviewed (PV)1  (In-House)         [id number]  Right Radiologic exam, shoulder; comp, 2 views  (Send-Out)         [id number]  Left Radiologic exam, shoulder; comp, 2 views  (Send-Out)         35121  Bilateral knee x-ray; three views  (Send-Out)           Lab Orders:       APPTO  Appointment need  (In-House)           Procedures Ordered:       REFER  Referral to Specialist or Other Facility  (Send-Out)           Other Orders:       [id number]  Therapeutic injection  (In-House)           Toradol, per 15 mg (x2)                 PLAN:          Classic migraine Still having problems with lots of headaches.  She follows with Dr. Pierre. Occipital nerve blocks are no longer helping like they once were.  She might consider asking Dr. Pierre about whether she is a candidate for Botox.  Continue current meds.  No refills needed.  Toradol shot given today.  RTC 3 months.     Narcotic or pain medication Toradol 30 mg MIPS Vaccines Flu and Pneumonia updated in Shot record Screening mammomgram done within last 2 years and results in are chart     FOLLOW-UP: Schedule a follow-up visit in 3 months.:.  Medicare wellness 30 min with Jahayleycorey           Orders:       3014F  Screening mammography results documented and reviewed (PV)1  (In-House)         APPTO  Appointment need  (In-House)         00109  Therapeutic injection  (In-House)                     Toradol, per 15 mg  (x2)          Essential hypertension BP at goal.  No refills needed.  Labs reviewed and UTD.          Hyperlipidemia Stable.  No refills needed.  Labs reviewed and UTD.          GERD Stable.  No refills needed.  Labs reviewed and UTD.          Shoulder pain Shoulder and knee pain most consistent with OA.  Checking XRs as below.  Consider PT when these come back vs Ortho referral.         RADIOLOGY:  I have ordered Shoulder x-ray: bilateral Right left shoulder to be done today.            Orders:       [id number]  Right Radiologic exam, shoulder; comp, 2 views  (Send-Out)         [id number]  Left Radiologic exam, shoulder; comp, 2 views  (Send-Out)            Knee pain         RADIOLOGY:  I have ordered a bilateral:  knee x-ray to be done today.            Orders:       [id number]  Bilateral knee x-ray; three views  (Send-Out)            Irritable bowel syndrome         REFERRALS:  Referral initiated to a gastroenterologist ( Dr. Manzo and Luis Carroll PA-C, [facility]; for evaluation of IBS with diarrhea, also needs screening colonoscopy ).            Orders:       REFER  Referral to Specialist or Other Facility  (Send-Out)               Patient Recommendations:        For  Classic migraine:     Schedule a follow-up visit in 3 months.                APPOINTMENT INFORMATION:        Monday Tuesday Wednesday Thursday Friday Saturday Sunday            Time:___________________AM  PM   Date:_____________________             CHARGE CAPTURE           **Please note: ICD descriptions below are intended for billing purposes only and may not represent clinical diagnoses**        Primary Diagnosis:         346.00 Classic migraine            G43.009    Migraine without aura, not intractable, without status migrainosus              Orders:          61633   Office/outpatient visit; established patient, level 4  (In-House)             3014F   Screening mammography results documented and reviewed (PV)1  (In-House)              APPTO   Appointment need  (In-House)             26241   Therapeutic injection  (In-House)                                           Toradol, per 15 mg (x2)         401 Essential hypertension            I10    Essential (primary) hypertension    272.4 Hyperlipidemia            E78.2    Mixed hyperlipidemia    244.9 Hypothyroidism            E03.9    Hypothyroidism, unspecified    530.81 GERD            K21.9    Gastro-esophageal reflux disease without esophagitis    719.41 Shoulder pain            M25.511    Pain in right shoulder           M25.512    Pain in left shoulder    719.46 Knee pain            M25.561    Pain in right knee           M25.562    Pain in left knee    564.1 Irritable bowel syndrome            K58.0    Irritable bowel syndrome with diarrhea

## 2021-05-18 NOTE — PROGRESS NOTES
Daisy Cesar  1960     Office/Outpatient Visit    Visit Date: Wed, Mar 31, 2021 08:33 am    Provider: Elise Cueto MD (Assistant: Seda Landeros RN)    Location: [facility]        Electronically signed by Elise Cueto MD on  03/31/2021 09:20:21 AM                             Subjective:        CC: Cristal is a 61 year old White female.  MCW Exam; PT STOPPED TAKING PRAVASTATIN        HPI:       She is due for colonoscopy, last done 2010 and this was normal.  Pap smear is no longer indicated by history; s/p hysterectomy.  She is UTD on mammogram, last done 9/2020 and this was normal.  She is UTD on DEXA, last done 9/2020 and this showed osteopenia.  She is due for Shingrix, Td and flu.  She is due for routine labs including HTN panel and TSH.    Cristal is here for a Medicare wellness visit.  The required HRA questions are integrated within this visit note. Family medical history and individual medical/surgical history were reviewed and updated.  A current height, weight, BMI, blood pressure, and pulse were recorded in the vitals section of the note and have been reviewed. Patient's medications, including supplements, were recorded in the chart and reviewed.  Current providers and suppliers were reviewed and updated.          Self-Assessment of Health: She rates her health as fair. She rates her confidence of being able to control/manage most of her health problems as somewhat confident. Her physical/emotional health has limited her social activites not at all.  A review of cognitive impairment was performed, including ability to drive a car, manage finances, and any memory changes, and was found to be negative.  A review of functional ability, including bathing, dressing, walking, and urine/bowel continence as well as level of safety was performed and was found to be negative.  Falls Risk: Has not had any falls or only one fall without injury in the past year.  In regard to hearing,  she reports having trouble hearing the TV/radio when others do not and having to strain to hear or understand conversations, but not wearing hearing aid(s).  Concerning home safety, she reports that at home she DOES have adequate lighting, a skid resistant shower/tub, grab bars in the bath, functioning smoke alarms and absence of throw rugs, but not handrails on stairs.          Immunization Status: declines vaccinations; Physical Activity: She exercises but less than 20 minutes 3 days per week; Type of diet patient normally eats is described as poor--needs improvement.  Tobacco: She has never smoked.  Preventative Health updated today           PHQ-9 Depression Screening: Completed form scanned and in chart; Total Score 2     ROS:     CONSTITUTIONAL:  Positive for fatigue.   Negative for fever.      EYES:  Negative for blurred vision.      E/N/T:  Positive for tinnitus.   Negative for diminished hearing or nasal congestion.      CARDIOVASCULAR:  Negative for chest pain, orthopnea, palpitations, paroxysmal nocturnal dyspnea, pedal edema and tachycardia.      RESPIRATORY:  Negative for recent cough and dyspnea.      GASTROINTESTINAL:  Positive for constipation and nausea.   Negative for abdominal pain, diarrhea or vomiting.      GENITOURINARY:  Positive for dysuria, urgency and frequent urination.   Negative for hematuria or urinary incontinence.      MUSCULOSKELETAL:  Positive for arthralgias, back pain and myalgias (improved since stopped statin).      INTEGUMENTARY/BREAST:  Positive for lipomatosis; tender.      NEUROLOGICAL:  Positive for headaches, paresthesia ( bilateral upper extremity; bilateral lower extremity ), vertigo and weakness ( bilateral upper extremity ).   Negative for dizziness.      PSYCHIATRIC:  Negative for anxiety, depression and sleep disturbance.          Past Medical History / Family History / Social History:         Last Reviewed on 3/31/2021 09:04 AM by Elise Cueto    Past Medical  History:                 PAST MEDICAL HISTORY         Hypertension     Asthma     Gastroesophageal Reflux Disease    Irritable Bowel Syndrome     Migraine Headaches         PREVENTIVE HEALTH MAINTENANCE             BONE DENSITY: was last done 9/24/2020 with the following abnormality noted-- osteopenia     COLORECTAL CANCER SCREENING: colonoscopy with normal results; 2000     Hepatitis C Medicare Screening: was last done 12/2017     MAMMOGRAM: Done within last 2 years and results in are chart was last done 9/24/2020 with normal results     PAP SMEAR: No longer indicated due to age and history         PAST MEDICAL HISTORY             CURRENT MEDICAL PROVIDERS:    Primary care provider ( Dr. Cueto )    Cardiologist: Dr. Amezcua    E/N/T: Dr. Lomeli    Neurologist: Dr. Pierre         Surgical History:         Hysterectomy: for endometriosis;     Tonsillectomy     Cholecystectomy    lipoma removal;     C4-C5 and C5-C6 fusion;    R and L carpal tunnel release;    R ulnar nerve release;    R knee surgery;         Family History:     Father: Hypertension;  bladder     Brother(s): Hepatic Carcinoma     Sister(s): thyroid CA     Paternal Grandfather: Coronary Artery Disease     Maternal Grandmother: Breast Cancer         Social History:     Occupation:    Disabled. formerly in hospital administration;     Marital Status: Single     Children: None         Tobacco/Alcohol/Supplements:     Last Reviewed on 3/31/2021 09:04 AM by Elise Cueto    Tobacco: She has never smoked.          Substance Abuse History:     Last Reviewed on 3/31/2021 09:04 AM by Elise Cueto        Mental Health History:     Last Reviewed on 3/31/2021 09:04 AM by Elise Cueto        Communicable Diseases (eg STDs):     Last Reviewed on 3/31/2021 09:04 AM by Elise Cueto        Current Problems:     Last Reviewed on 10/14/2020 11:18 AM by Elise Cueto    IBS - Irritable bowel syndrome with diarrhea    Low back pain    Asthma - Unspecified  asthma, uncomplicated    GERD - Gastro-esophageal reflux disease without esophagitis    Lipomatosis, not elsewhere classified    Rosacea, unspecified    Upper back pain - Pain in thoracic spine    HLD - Mixed hyperlipidemia    Migraine without aura, not intractable, without status migrainosus    Hypothyroidism, unspecified    BPPV - Benign paroxysmal vertigo, left ear    Other long term (current) drug therapy    Esophageal stricture - Esophageal obstruction    Osteopenia - Other specified disorders of bone density and structure, other site    Nontoxic single thyroid nodule    Primary generalized (osteo)arthritis    Ataxia, unspecified    Pain in other specified joint        Immunizations:     None        Allergies:     Last Reviewed on 10/14/2020 11:18 AM by Elise Cueto    Penicillins:      Morphine Sulfate: migraine headache     Topiramate:   (Adverse Reaction)    Zonisamide: diarrhea,Stomach pain  (Adverse Reaction)    Zofran ODT: palpitations  (Adverse Reaction)        Current Medications:     Last Reviewed on 10/14/2020 11:18 AM by Elise Cueto    tiZANidine 4 mg oral capsule [QID]    meloxicam 15 mg oral tablet [1 tab daily]    Migranal 0.5 mg/pump act. (4 mg/mL) Intranasal Spray, Non-Aerosol [1 spray in each nostril, repeat in 15min, no more than 6 sprays/day & 8 sprays/week]    amitriptyline 100 mg oral tablet [Take 1 tablet(s) by mouth at bedtime]    LevoxyL 50 mcg oral tablet [Take 1 tablet(s) by mouth daily]    Vitamin D 500mg daily     Caltrate with Vitamin D3 600 mg(1,500mg) -800 unit oral tablet [Take 1 tablet(s) by mouth daily]    Acetaminophen 500 mg oral tablet [1 TAB PO TID ]    pravastatin 40 mg oral tablet [1 tab daily]    Ventolin HFA 90 mcg/actuation Inhalation HFA Aerosol Inhaler [1 puff inhaled every 4-6 hours prn SOB/wheezing]    Voltaren  1 % Topical Gel [Apply 4 g to affected area up to 4 times a day ]    promethazine 25 mg oral tablet [1 PO Q 8 Hrs PRN Nausea]    Metronidazole 0.75  % Topical Cream [Apply thin film to affected area am and pm]    omeprazole 40 mg oral capsule,delayed release (enteric coated) [1 capsule daily]    dicyclomine 10 mg oral capsule [Take 1 capsule(s) by mouth bid prn cramping]    diclofenac epolamine 1.3 % Transdermal Patch, Transdermal 12 Hours [apply 1 patch (180 mg) to most painful area by transdermal route 2 times per day]        Objective:        Vitals:         Current: 3/31/2021 8:41:03 AM    Ht:  5 ft, 4 in;  Wt: 205.2 lbs;  BMI: 35.2T: 98.5 F (oral);  BP: 133/76 mm Hg (right arm, sitting);  P: 96 bpm (right arm (BP Cuff), sitting);  sCr: 0.69 mg/dL;  GFR: 96.02VA: 20/20 OD, 20/20 OS (near, without correction)        Exams:     PHYSICAL EXAM:     GENERAL: vital signs recorded - well developed, well nourished;  well groomed;  no apparent distress;     EYES: extraocular movements intact; conjunctiva and cornea are normal; PERRLA;     RESPIRATORY: normal respiratory rate and pattern with no distress; normal breath sounds with no rales, rhonchi, wheezes or rubs;     CARDIOVASCULAR: normal rate; rhythm is regular;  no systolic murmur; no edema;     GASTROINTESTINAL: nontender; normal bowel sounds;     MUSCULOSKELETAL: normal gait; normal overall tone     NEUROLOGIC: mental status: alert and oriented x 3; Reflexes: brachioradialis: 2+; knee jerks: 2+;     PSYCHIATRIC: appropriate affect and demeanor; normal psychomotor function; normal speech pattern;         Assessment:         Z00.00   Encounter for general adult medical examination without abnormal findings       Z13.31   Encounter for screening for depression       R30.0   Dysuria       E78.2   HLD - Mixed hyperlipidemia       E03.9   Hypothyroidism, unspecified       K22.2   Esophageal stricture - Esophageal obstruction       Z12.11   Encounter for screening for malignant neoplasm of colon       788.41   Frequent urination   (Mild)         ORDERS:         Meds Prescribed:       [New Rx] phenazopyridine 100 mg  oral tablet [take 1 tablet (100 mg) by oral route 3 times per day prn bladder spasm x 3 days], #9 (nine) tablets, Refills: 0 (zero)       [New Rx] Macrobid 100 mg oral capsule [take 1 capsule (100 mg) by oral route BID x 5 days], #10 (ten) capsules, Refills: 0 (zero)         Lab Orders:       [id number]  BDUAM - [facility] Urinalysis, automated, with micro  (Send-Out)            77127  TSH - [facility] TSH  (Send-Out)            37223  HTNLP - [facility] CMP AND LIPID: [id number], [id number]  (Send-Out)            APPTO  Appointment need  (In-House)              Procedures Ordered:         Annual wellness visit, includes a PPPS, subsequent visit  (In-House)            REFER  Referral to Specialist or Other Facility  (Send-Out)            REFER  Referral to Specialist or Other Facility  (Send-Out)              Other Orders:         Depression screen negative  (In-House)            1101F  Pt screen for fall risk; document no falls in past year or only 1 fall w/o injury in past year (HUANG)  (In-House)              Screening mammogram results documented  (Send-Out)            1123F  Advance Care Planning discussed and doc; advance care plan or surrogate decision maker doc. in MR  (Send-Out)                      Plan:         Encounter for general adult medical examination without abnormal findingsShe is due for colonoscopy, last done 2010 and this was normal; ordered.  Pap smear is no longer indicated by history; s/p hysterectomy.  She is UTD on mammogram, last done 9/2020 and this was normal.  She is UTD on DEXA, last done 9/2020 and this showed osteopenia.  She is due for Shingrix, Td and flu; declines.  COVID vaccine recommended ASAP.  She is due for routine labs including HTN panel and TSH; ordered.  No fall risk, no memory issues, no signs/symptoms of depression.  She lives alone.  She is able to drive and perform ADLs/manage finances independently.  Hearing is adequate.  She has a living will.  Preventive services handout and safety  handout were given to her.  Current doctor list updated.  RTC 4 months.    MIPS PHQ-9 Depression Screening: Completed form scanned and in chart; Total Score 2; Negative Depression Screen ADVANCED DIRECTIVES: None         FOLLOW-UP: Schedule a follow-up visit in 4 months.:.  f/u migraines with [place]          Orders:         Annual wellness visit, includes a PPPS, subsequent visit  (In-House)              Depression screen negative  (In-House)            1101F  Pt screen for fall risk; document no falls in past year or only 1 fall w/o injury in past year (HUANG)  (In-House)              Screening mammogram results documented  (Send-Out)            1123F  Advance Care Planning discussed and doc; advance care plan or surrogate decision maker doc. in MR  (Send-Out)            APPTO  Appointment need  (In-House)              Dysuria          Prescriptions:       [New Rx] phenazopyridine 100 mg oral tablet [take 1 tablet (100 mg) by oral route 3 times per day prn bladder spasm x 3 days], #9 (nine) tablets, Refills: 0 (zero)       [New Rx] Macrobid 100 mg oral capsule [take 1 capsule (100 mg) by oral route BID x 5 days], #10 (ten) capsules, Refills: 0 (zero)           Orders:       [id number]  BDUAM - [facility] Urinalysis, automated, with micro  (Send-Out)              HLD - Mixed hyperlipidemiaChecking labs.    LABORATORY:  Labs ordered to be performed today include HTN/Lipid Panel: CMP, Lipid.            Orders:       [id number]  HTNLP - [facility] CMP AND LIPID: [id number], [id number]  (Send-Out)              Hypothyroidism, unspecifiedChecking labs.    LABORATORY:  Labs ordered to be performed today include TSH.            Orders:       [id number]  TSH - [facility] TSH  (Send-Out)              Esophageal stricture - Esophageal obstructionShe was scheduled for this prior to COVID, but when the pandemic happened, it fell through the cracks.        REFERRALS:  Referral initiated to a general surgeon ( Dr. Dr. Lantigua/Peri/Rosalio; for evaluation  of esophageal stricture ).            Orders:       REFER  Referral to Specialist or Other Facility  (Send-Out)              Encounter for screening for malignant neoplasm of colonShe was scheduled for this prior to COVID, but when the pandemic happened, it fell through the cracks.        REFERRALS:  Referral initiated to a general surgeon ( for evaluation of Dr. Lantigua/Peri/Rosalio; a colonoscopy ).            Orders:       REFER  Referral to Specialist or Other Facility  (Send-Out)                  Patient Recommendations:        For  Encounter for general adult medical examination without abnormal findings:    Schedule a follow-up visit in 4 months.                APPOINTMENT INFORMATION:        Monday Tuesday Wednesday Thursday Friday Saturday Sunday            Time:___________________AM  PM   Date:_____________________             Charge Capture:         Primary Diagnosis:     Z00.00  Encounter for general adult medical examination without abnormal findings           Orders:        Annual wellness visit, includes a PPPS, subsequent visit  (In-House)              Depression screen negative  (In-House)            1101F  Pt screen for fall risk; document no falls in past year or only 1 fall w/o injury in past year (HUANG)  (In-House)            APPTO  Appointment need  (In-House)              Z13.31  Encounter for screening for depression     R30.0  Dysuria     E78.2  HLD - Mixed hyperlipidemia     E03.9  Hypothyroidism, unspecified     K22.2  Esophageal stricture - Esophageal obstruction     Z12.11  Encounter for screening for malignant neoplasm of colon     788.41  Frequent urination

## 2021-05-18 NOTE — PROGRESS NOTES
Daisy Cesar  1960     Office/Outpatient Visit    Visit Date: Wed, Aug 5, 2020 09:10 am    Provider: Elise Cueto MD (Assistant: Cara Marie LPN)    Location: [facility]        Electronically signed by Elise Cueto MD on  08/05/2020 10:22:23 AM                             Subjective:        CC: also gets lidocaine injections from Dr. Harrison q 2 monthsCristal is a 60 year old White female.  medication refills. AUDIO ONLY, doesn't have video access [phone];         HPI: Cristal's telehealth visit today is to f/u on chronic issues.          She continues to struggle with refractory migraine headaches.  She is on meloxicam, amitriptyline, Migranal, and tizanidine.  She only uses the Migranal when the headaches are frontal.  She has been on Lyrica but stopped because it caused blurry vision.  Flector patches have worked well for her but insurance has refused to cover these.  She has found some relief from topical diclofenac gel applied to the back of the neck.  Maxalt and Imitrex were too sedating.  Naratriptan increased her BP.  Zonisamide caused stomach pain and diarrhea.  Topiramate worsened her BPPV.  She is following with Dr. Pierre for occipital nerve blocks, now Q2 months.  Those worked really well for her when she first started them.  However, she has started to have less relief with them.  She used to feel good for about a month after, then the pain returns.  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  However, Dr. Quispe evaluated her and told her that these are not in fact lipomas.  However, she is not sure what they are.  She has noticed that her headaches are coming daily or every other day.  Headaches that she wakes with tend to last longer, throughout the day.        She is having spells of ataxia that occur every 1-2 month.  Worse on the left.  If she tries to walk during these spells, she lists to the left  "side.  She does not really feel weak though.  \"Everything comes towards my face.\"  She has seen ENT for vertigo and was dxed with BPPV but this feels different.        She is on levothyroxine for hypothyroidism.  No heat/cold intolerance, changes in hair or skin.        She is on pravastatin for HLD.  No myalgias.        She is on omeprazole for GERD.  No heartburn or indigestion.  She is on dicyclomine for IBS and abdominal cramping.    ROS:     CONSTITUTIONAL:  Positive for fatigue.   Negative for fever.      EYES:  Negative for blurred vision.      E/N/T:  Positive for tinnitus.   Negative for diminished hearing or nasal congestion.      CARDIOVASCULAR:  Negative for chest pain, orthopnea, palpitations, paroxysmal nocturnal dyspnea, pedal edema and tachycardia.      RESPIRATORY:  Negative for recent cough and dyspnea.      GASTROINTESTINAL:  Positive for constipation and nausea.   Negative for abdominal pain, diarrhea or vomiting.      MUSCULOSKELETAL:  Positive for arthralgias and back pain.   Negative for myalgias.      INTEGUMENTARY/BREAST:  Positive for lipomatosis; tender.      NEUROLOGICAL:  Positive for ataxia, headaches, paresthesia ( bilateral upper extremity; bilateral lower extremity ), vertigo and weakness ( bilateral upper extremity ).   Negative for dizziness.          Past Medical History / Family History / Social History:         Last Reviewed on 8/05/2020 09:55 AM by Elise Cueto    Past Medical History:                 PAST MEDICAL HISTORY         Hypertension     Asthma     Gastroesophageal Reflux Disease    Irritable Bowel Syndrome     Migraine Headaches         PREVENTIVE HEALTH MAINTENANCE             BONE DENSITY: was last done 3/7/2018 with the following abnormality noted-- osteopenia     COLORECTAL CANCER SCREENING: colonoscopy with normal results; 2000     Hepatitis C Medicare Screening: was last done 12/2017     MAMMOGRAM: Done within last 2 years and results in are chart was last " done 3/11/2019 with normal results     PAP SMEAR: No longer indicated due to age and history         PAST MEDICAL HISTORY             CURRENT MEDICAL PROVIDERS:    Primary care provider ( Dr. Cueto )    Cardiologist: Dr. Amezcua    E/N/T: Dr. Lomeli    Neurologist: Dr. Pierre         Surgical History:         Hysterectomy: for endometriosis;     Tonsillectomy     Cholecystectomy    lipoma removal;     C4-C5 and C5-C6 fusion;    R and L carpal tunnel release;    R ulnar nerve release;    R knee surgery;         Family History:     Father: Hypertension;  bladder     Brother(s): Hepatic Carcinoma     Sister(s): thyroid CA     Paternal Grandfather: Coronary Artery Disease     Maternal Grandmother: Breast Cancer         Social History:     Occupation:    Disabled. formerly in hospital administration;     Marital Status: Single     Children: None         Tobacco/Alcohol/Supplements:     Last Reviewed on 8/05/2020 09:55 AM by Elise Cueto    Tobacco: She has never smoked.          Substance Abuse History:     Last Reviewed on 8/05/2020 09:55 AM by Elise Cueto        Mental Health History:     Last Reviewed on 8/05/2020 09:55 AM by Elise Cueto        Communicable Diseases (eg STDs):     Last Reviewed on 8/05/2020 09:55 AM by Elise Cueto        Current Problems:     Last Reviewed on 5/05/2020 10:02 AM by Elise Cueto    Asthma - Unspecified asthma, uncomplicated    GERD - Gastro-esophageal reflux disease without esophagitis    IBS - Irritable bowel syndrome with diarrhea    Low back pain    Lipomatosis, not elsewhere classified    Rosacea, unspecified    Upper back pain - Pain in thoracic spine    HLD - Mixed hyperlipidemia    Migraine without aura, not intractable, without status migrainosus    Hypothyroidism, unspecified    BPPV - Benign paroxysmal vertigo, left ear    Esophageal stricture - Esophageal obstruction    Osteopenia - Other specified disorders of bone density and structure, other site     Other long term (current) drug therapy    Nontoxic single thyroid nodule    Primary generalized (osteo)arthritis        Immunizations:     None        Allergies:     Last Reviewed on 5/05/2020 10:02 AM by Elise Cueto    Penicillins:      Morphine Sulfate: migraine headache     Topiramate:   (Adverse Reaction)    Zonisamide: diarrhea,Stomach pain  (Adverse Reaction)    Zofran ODT: palpitations  (Adverse Reaction)        Current Medications:     Last Reviewed on 5/05/2020 10:02 AM by Elise Cueto    Migranal 0.5 mg/pump act. (4 mg/mL) Intranasal Spray, Non-Aerosol [1 spray in each nostril, repeat in 15min, no more than 6 sprays/day & 8 sprays/week]    meloxicam 15 mg oral tablet [1 tab daily]    tiZANidine 4 mg oral capsule [QID]    amitriptyline 100 mg oral tablet [Take 1 tablet(s) by mouth at bedtime]    LevoxyL 50 mcg oral tablet [Take 1 tablet(s) by mouth daily]    Vitamin D 500mg daily     Caltrate with Vitamin D3 600 mg(1,500mg) -800 unit oral tablet [Take 1 tablet(s) by mouth daily]    Acetaminophen 500 mg oral tablet [1 TAB PO TID ]    pravastatin 40 mg oral tablet [1 tab daily]    Ventolin HFA 90 mcg/actuation Inhalation HFA Aerosol Inhaler [1 puff inhaled every 4-6 hours prn SOB/wheezing]    Voltaren  1 % Topical Gel [Apply 4 g to affected area up to 4 times a day ]    promethazine 25 mg oral tablet [1 PO Q 8 Hrs PRN Nausea]    Metronidazole 0.75 % Topical Cream [Apply thin film to affected area am and pm]    omeprazole 40 mg oral capsule,delayed release (enteric coated) [1 capsule daily]    dicyclomine 10 mg oral capsule [Take 1 capsule(s) by mouth bid prn cramping]        Assessment:         G43.009   Migraine without aura, not intractable, without status migrainosus       R27.0   Ataxia, unspecified       E78.2   HLD - Mixed hyperlipidemia       E03.9   Hypothyroidism, unspecified       L71.9   Rosacea, unspecified       K21.9   GERD - Gastro-esophageal reflux disease without esophagitis        K58.0   IBS - Irritable bowel syndrome with diarrhea           ORDERS:         Meds Prescribed:       [New Rx] diclofenac epolamine 1.3 % Transdermal Patch, Transdermal 12 Hours [apply 1 patch (180 mg) to most painful area by transdermal route 2 times per day], #60 (sixty) patches, Refills: 5 (five)       [Refilled] Metronidazole 0.75 % Topical Cream [Apply thin film to affected area am and pm], #45 (forty five) grams, Refills: 5 (five)         Radiology/Test Orders:       23370  MRI, brain; with contrast  (Send-Out)            62085  MRI, brain; without contrast  (Send-Out)              Lab Orders:       APPTO  Appointment need  (In-House)            99436  HTNLP - [facility] CMP AND LIPID: [id number], 74534  (Send-Out)            51293  TSH - [facility] TSH  (Send-Out)              Other Orders:         Screening mammogram results documented  (Send-Out)                      Plan:         Migraine without aura, not intractable, without status migrainosusStable.  Following with Dr. Pierre.  She would like to have another go at getting Flector patches approved by her insurance so that has been sent in; will require PA.  She does use the diclofenac gel, but it does not work as well as the patches.  No other refills needed.  RTC 2 months.    MIPS Vaccines Flu and Pneumonia updated in Shot record Screening mammomgram done within last 2 years and results in are chart Telehealth: Verbal consent obtained for visit to occur via phone call; Staff, other than provider, present during telephone visit include Cara Marie LPN; Total time spent was 15 minutes; 20645--Qwhkmepwp E/M 11-20 minutes     FOLLOW-UP: Schedule a follow-up visit in 2 months.:.  f/u migraines with [facility] ***OK TO OPEN ACUTE ILLNESS***          Prescriptions:       [New Rx] diclofenac epolamine 1.3 % Transdermal Patch, Transdermal 12 Hours [apply 1 patch (180 mg) to most painful area by transdermal route 2 times per day], #60 (sixty) patches, Refills: 5 (five)            Orders:         Screening mammogram results documented  (Send-Out)            APPTO  Appointment need  (In-House)              Ataxia, unspecifiedNew onset ataxia over the past few months.  Evaluating with MRI brain.        RADIOLOGY:  I have ordered a head MRI with contrast without contrast to be done today.            Orders:       [id number]  MRI, brain; with contrast  (Send-Out)            74857  MRI, brain; without contrast  (Send-Out)              HLD - Mixed hyperlipidemia    LABORATORY:  Labs ordered to be performed today include HTN/Lipid Panel: CMP, Lipid.            Orders:       71008  HTNLP - [facility] CMP AND LIPID: [id number], 14624  (Send-Out)              Hypothyroidism, unspecified    LABORATORY:  Labs ordered to be performed today include TSH.            Orders:       [id number]  TSH - [facility] TSH  (Send-Out)              Rosacea, unspecified          Prescriptions:       [Refilled] Metronidazole 0.75 % Topical Cream [Apply thin film to affected area am and pm], #45 (forty five) grams, Refills: 5 (five)         GERD - Gastro-esophageal reflux disease without esophagitisStable.  No refills needed.        IBS - Irritable bowel syndrome with diarrheaStable.  No refills needed.            Patient Recommendations:        For  Migraine without aura, not intractable, without status migrainosus:    Schedule a follow-up visit in 2 months.                APPOINTMENT INFORMATION:        Monday Tuesday Wednesday Thursday Friday Saturday Sunday            Time:___________________AM  PM   Date:_____________________             Charge Capture:         Primary Diagnosis:     G43.009  Migraine without aura, not intractable, without status migrainosus           Orders:      APPTO  Appointment need  (In-House)            64696  Phys/QHP telephone evaluation 11-20 minutes  (In-House)              R27.0  Ataxia, unspecified     E78.2  HLD - Mixed hyperlipidemia     E03.9  Hypothyroidism, unspecified     L71.9  Rosacea, unspecified      K21.9  GERD - Gastro-esophageal reflux disease without esophagitis     K58.0  IBS - Irritable bowel syndrome with diarrhea

## 2021-05-18 NOTE — PROGRESS NOTES
Daisy CesarAris 1960     Office/Outpatient Visit    Visit Date: Wed, Oct 10, 2018 01:56 pm    Provider: Elise Cueto MD (Assistant: Destini Otero MA)    Location: [facility]        Electronically signed by Elise Cueto MD on  10/10/2018 04:54:40 PM                             SUBJECTIVE:        CC:     Cristal is a 58 year old White female.  This is a follow-up visit.  patient presents today for three month follow up;         HPI: Cristal is here today to follow up on chronic issues.          Her main issues are refractory migraine headaches.  She is currently on meloxicam, amitriptyline, Migranal, and tizanidine.  She was previously on Lyrica but stopped because it caused blurry vision.  Flector patches have worked well for her but insurance has refused to cover these.  She has found some relief from topical diclofenac gel.  Maxalt and Imitrex were too sedating.  Naratriptan increased her BP.  Zonisamide caused stomach pain and diarrhea.  Topiramate worsened her BPPV.  She is following with Dr. Pierre for occipital nerve blocks and they are planning to go for NCVs.        She is on levothyroxine for hypothyroidism.  No heat/cold intolerance, diarrhea or constipation, changes in hair or skin.        She is on pravastatin for HLD.  No myalgias.        She is on omeprazole for GERD.  No heartburn, reflux or epigastric pain.  She is on dicyclomine for IBS and abdominal cramping.     ROS:     CONSTITUTIONAL:  Negative for fatigue and fever.      EYES:  Negative for blurred vision.      E/N/T:  Positive for tinnitus.   Negative for diminished hearing or nasal congestion.      CARDIOVASCULAR:  Negative for chest pain, orthopnea, palpitations, paroxysmal nocturnal dyspnea, pedal edema and tachycardia.      RESPIRATORY:  Negative for recent cough and dyspnea.      GASTROINTESTINAL:  Positive for abdominal pain ( epigastric; LUQ ), constipation, nausea ( with migraines ) and vomiting.   Negative  for diarrhea.      MUSCULOSKELETAL:  Positive for arthralgias and back pain.   Negative for myalgias.      INTEGUMENTARY/BREAST:  Positive for lipomatosis; tender.      NEUROLOGICAL:  Positive for headaches, paresthesia ( bilateral upper extremity; bilateral lower extremity ) and weakness ( bilateral upper extremity ).   Negative for dizziness or vertigo (resolved s/p Epley maneuvers with ENT).      PSYCHIATRIC:  Positive for feelings of stress.   Negative for anxiety, depression, anhedonia or sleep disturbance.          PMH/FMH/SH:     Last Reviewed on 10/10/2018 02:14 PM by Elise Cueto    Past Medical History:                 PAST MEDICAL HISTORY         Hypertension     Asthma     Gastroesophageal Reflux Disease    Irritable Bowel Syndrome     Migraine Headaches         PREVENTIVE HEALTH MAINTENANCE             BONE DENSITY: was last done 3/7/2018 with the following abnormality noted-- osteopenia     Hepatitis C Medicare Screening: was last done 12/2017     MAMMOGRAM: Done within last 2 years and results in are chart was last done 3/7/2018 with normal results     PAP SMEAR: No longer indicated due to age and history         PAST MEDICAL HISTORY             CURRENT MEDICAL PROVIDERS:    Primary care provider ( Dr. Cueto )    Cardiologist: Dr. Amezcua    E/N/T: Dr. Lomeli         Surgical History:         Hysterectomy: for endometriosis;     Tonsillectomy      Cholecystectomy    lipoma removal;      C4-C5 and C5-C6 fusion;    R and L carpal tunnel release;    R ulnar nerve release;    R knee surgery;         Family History:     Father: Hypertension;  bladder     Brother(s): Hepatic Carcinoma     Sister(s): thyroid CA     Paternal Grandfather: Coronary Artery Disease     Maternal Grandmother: Breast Cancer         Social History:     Occupation:    Disabled. formerly in hospital administration;     Marital Status: Single     Children: None         Tobacco/Alcohol/Supplements:     Last Reviewed on 10/10/2018  02:14 PM by Elise Cueto    Tobacco: She has never smoked.          Substance Abuse History:     Last Reviewed on 10/10/2018 02:14 PM by Elise Cueto        Mental Health History:     Last Reviewed on 10/10/2018 02:14 PM by Elise Cueto        Communicable Diseases (eg STDs):     Last Reviewed on 10/10/2018 02:14 PM by Elise Cueto            Current Problems:     Last Reviewed on 7/05/2018 03:11 PM by Elise Cueto    Thyroid nodule     Migraine, unspecified, without mention of intractable migraine without mention of status migrainosus     Esophageal stricture     Osteopenia     Use of high risk medications     Hypothyroidism     Hyperlipidemia     Classic migraine     Upper back pain     Dizziness     Rosacea     Lipomatosis     Asthma     Essential hypertension     Irritable bowel syndrome     GERD     Chronic low back pain     BPPV         Immunizations:     None        Allergies:     Last Reviewed on 7/05/2018 03:11 PM by Elise Cueto    Penicillins:    Morphine Sulfate: migraine headache    Topiramate: (Adverse Reaction)    Zonisamide: diarrhea, stomach pain (Adverse Reaction)    Zofran ODT: palpitations (Adverse Reaction)    Zofran ODT: palpitations (Adverse Reaction)        Current Medications:     Last Reviewed on 7/05/2018 03:11 PM by Elise Cueto    Azelastine HCl 0.1% Nasal Spray 1 spray each nostril BID     Levoxyl 0.05mg Tablet Take 1 tablet(s) by mouth daily     Meloxicam 15mg Tablet 1 tab daily     Migranal 4mg/1ml Nasal Spray 1 spray in each nostril, repeat in 15min, no more than 6 sprays/day & 8 sprays/week     Voltaren  1% Topical Gel Apply 4 g to affected area up to 4 times a day     Pravastatin 40mg Tablet 1 tab daily     Tizanidine HCl 4mg Capsules QID     Metronidazole 0.75% Cream Apply thin film to affected area am and pm     Ventolin HFA 90mcg/1actuation Oral Inhaler 1 puff inhaled every 4-6 hours prn SOB/wheezing     Promethazine HCl 25mg Tablet 1 PO Q 8 Hrs PRN Nausea      Dicyclomine HCl 10mg Capsules Take 1 capsule(s) by mouth bid prn cramping     Prilosec 40mg Capsules, Extended Release 1 tab daily     Acetaminophen 500mg Tablet 1 TAB PO TID     Caltrate 600 + D 600mg/800IU Tablet Take 1 tablet(s) by mouth daily     Diphenhydramine HCl 25mg Capsules 1 capsule q am daily     Vitamin D 500mg daily         OBJECTIVE:        Vitals:         Current: 10/10/2018 2:01:19 PM    Ht:  5 ft, 4 in;  Wt: 207.4 lbs;  BMI: 35.6    T: 98.6 F (oral);  BP: 142/78 mm Hg (right arm, sitting);  P: 110 bpm (right arm (BP Cuff), sitting);  sCr: 0.67 mg/dL;  GFR: 103.03        Exams:     PHYSICAL EXAM:     GENERAL: vital signs recorded - well developed, well nourished;  well groomed;  no apparent distress;     EYES: extraocular movements intact; conjunctiva and cornea are normal; PERRLA;     E/N/T: OROPHARYNX:  normal mucosa, dentition, gingiva, and posterior pharynx;     RESPIRATORY: normal respiratory rate and pattern with no distress; normal breath sounds with no rales, rhonchi, wheezes or rubs;     CARDIOVASCULAR: normal rate; rhythm is regular;  no systolic murmur; no edema;     GASTROINTESTINAL: nontender; normal bowel sounds;     MUSCULOSKELETAL: normal gait; normal overall tone         Procedures:     Migraine, unspecified, without mention of intractable migraine without mention of status migrainosus     1. Toradol 30 mg given IM in the left hip; administered by AS;  lot number [id number]; expires 11/2019             ASSESSMENT           346.90   G43.909  Migraine, unspecified, without mention of intractable migraine without mention of status migrainosus              DDx:     272.8   E88.2  Lipomatosis              DDx:     272.4   E78.2  Hyperlipidemia              DDx:     244.9   E03.9  Hypothyroidism              DDx:     564.1   K58.0  Irritable bowel syndrome              DDx:     530.81   K21.9  GERD              DDx:         ORDERS:         Lab Orders:       APPTO  Appointment need   (In-House)           Procedures Ordered:       REFER  Referral to Specialist or Other Facility  (Send-Out)           Other Orders:       [id number]  Therapeutic injection  (In-House)           Toradol, per 15 mg (x2)                 PLAN:          Migraine, unspecified, without mention of intractable migraine without mention of status migrainosus Cristal continues to have trouble with her migraines.  She is following with Dr. Pierre who is doing occipital nerve blocks for her and those seem to have helped somewhat.  Her lipomas seem to be growing and causing increased pain in the head and neck.  For now, we are going to continue meds as previously prescribed.  We will also get her in with Dr. Quispe to see if anything can be done surgically about the lipomatosis.  RTC 3 months or sooner prn.     Narcotic or pain medication Toradol 30 mg     FOLLOW-UP: Schedule a follow-up visit in 3 months..  f/u 4 months with Rom           Orders:       APPTO  Appointment need  (In-House)         03473  Therapeutic injection  (In-House)                     Toradol, per 15 mg (x2)           Patient Education Handouts:       Migraine Headache           Lipomatosis As above.         REFERRALS:  Referral initiated to a plastic surgeon ( Dr. Belem Quispe, [facility] lipomatosis ).            Orders:       REFER  Referral to Specialist or Other Facility  (Send-Out)            Hyperlipidemia UTD on labs.  No refills needed.          Hypothyroidism UTD on labs.  No refills needed.          Irritable bowel syndrome Stable.  No refills needed.          GERD Stable.  No refills needed.             Patient Recommendations:        For  Migraine, unspecified, without mention of intractable migraine without mention of status migrainosus:     Schedule a follow-up visit in 3 months.                APPOINTMENT INFORMATION:        Monday Tuesday Wednesday Thursday Friday Saturday Sunday             Time:___________________AM  PM   Date:_____________________         For  Lipomatosis:     I also recommend lipomatosis.              CHARGE CAPTURE           **Please note: ICD descriptions below are intended for billing purposes only and may not represent clinical diagnoses**        Primary Diagnosis:         346.90 Migraine, unspecified, without mention of intractable migraine without mention of status migrainosus            G43.909    Migraine, unspecified, not intractable, without status migrainosus              Orders:          41128   Office/outpatient visit; established patient, level 4  (In-House)             APPTO   Appointment need  (In-House)             32646   Therapeutic injection  (In-House)                                           Toradol, per 15 mg (x2)         272.8 Lipomatosis            E88.2    Lipomatosis, not elsewhere classified    272.4 Hyperlipidemia            E78.2    Mixed hyperlipidemia    244.9 Hypothyroidism            E03.9    Hypothyroidism, unspecified    564.1 Irritable bowel syndrome            K58.0    Irritable bowel syndrome with diarrhea    530.81 GERD            K21.9    Gastro-esophageal reflux disease without esophagitis        ADDENDUMS:      ____________________________________    Addendum: 10/11/2018 08:07 AM - Mary Garcia         Visit Note Faxed to:        User Entered Recipient; Number [phone]            Addendum: 10/11/2018 09:03 AM - Mary Garcia         Visit Note Faxed to:        Belem Quispe (Plastic Surgery); Number [phone]

## 2021-05-18 NOTE — PROGRESS NOTES
Dasiy CesarAris 1960     Office/Outpatient Visit    Visit Date: Thu, Jul 5, 2018 02:47 pm    Provider: Elise Cueto MD (Assistant: Kalee Castillo MA)    Location: [facility]        Electronically signed by Elise Cueto MD on  07/05/2018 03:31:17 PM                             SUBJECTIVE:        CC:     Cristal is a 58 year old White female.  med refill, nausea;         HPI: Cristal is here to f/u on chronic issues.          She continues to struggle with refractory migraine headaches.  She is currently on meloxicam, amitriptyline, Migranal, and tizanidine.  She was previously on Lyrica but stopped because it caused blurry vision.  Flector patches have worked well for her but insurance has refused to cover these.  She has found some relief from topical diclofenac gel.  Maxalt and Imitrex, but these were too sedating.  Naratriptan increased her BP.  Zonisamide caused stomach pain and diarrhea.  Topiramate worsened her BPPV.  She has been seeing Dr. Pierre for occipital nerve blocks.  She is now starting to have more numbness in the arms and legs lately.  She is planning to go for NCVs with Dr. Pierre.  She has an appt there next month.        She is on levothyroxine for hypothyroidism.  No heat/cold intolerance, diarrhea or constipation.        She is on pravastatin for HLD.  No myalgias or other adverse effects.        She is on omeprazole for GERD.  No heartburn, reflux or epigastric pain.  She is on dicyclomine for IBS and abdominal cramping.  She has been having some nausea lately though.  It is worse with the migraines but seems to be worse over all.     ROS:     CONSTITUTIONAL:  Negative for fatigue and fever.      EYES:  Negative for blurred vision.      E/N/T:  Positive for tinnitus.   Negative for diminished hearing or nasal congestion.      CARDIOVASCULAR:  Negative for chest pain, orthopnea, palpitations, paroxysmal nocturnal dyspnea, pedal edema and tachycardia.       RESPIRATORY:  Negative for recent cough and dyspnea.      GASTROINTESTINAL:  Positive for abdominal pain ( epigastric; LUQ ), constipation, nausea ( with migraines ) and vomiting.   Negative for diarrhea.      MUSCULOSKELETAL:  Positive for arthralgias and back pain.   Negative for myalgias.      INTEGUMENTARY/BREAST:  Positive for lipomatosis; tender.      NEUROLOGICAL:  Positive for headaches, paresthesia ( bilateral upper extremity; bilateral lower extremity ) and weakness ( bilateral upper extremity ).   Negative for dizziness or vertigo (resolved s/p Epley maneuvers with ENT).      PSYCHIATRIC:  Positive for feelings of stress.   Negative for anxiety, depression, anhedonia or sleep disturbance.          PMH/FMH/SH:     Last Reviewed on 7/05/2018 03:11 PM by Elise Cueto    Past Medical History:                 PAST MEDICAL HISTORY         Hypertension     Asthma     Gastroesophageal Reflux Disease    Irritable Bowel Syndrome     Migraine Headaches         PREVENTIVE HEALTH MAINTENANCE             BONE DENSITY: was last done 3/7/2018 with the following abnormality noted-- osteopenia     Hepatitis C Medicare Screening: was last done 12/2017     MAMMOGRAM: Done within last 2 years and results in are chart was last done 3/7/2018 with normal results     PAP SMEAR: No longer indicated due to age and history         PAST MEDICAL HISTORY             CURRENT MEDICAL PROVIDERS:    Primary care provider ( Dr. Cueto )    Cardiologist: Dr. Amezcua    E/N/T: Dr. Lomeli         Surgical History:         Hysterectomy: for endometriosis;     Tonsillectomy      Cholecystectomy    lipoma removal;      C4-C5 and C5-C6 fusion;    R and L carpal tunnel release;    R ulnar nerve release;    R knee surgery;         Family History:     Father: Hypertension;  bladder     Brother(s): Hepatic Carcinoma     Sister(s): thyroid CA     Paternal Grandfather: Coronary Artery Disease     Maternal Grandmother: Breast Cancer         Social  History:     Occupation:    Disabled. formerly in hospital administration;     Marital Status: Single     Children: None         Tobacco/Alcohol/Supplements:     Last Reviewed on 7/05/2018 03:11 PM by Elise Cueto    Tobacco: She has never smoked.          Substance Abuse History:     Last Reviewed on 7/05/2018 03:11 PM by Elise Cueto        Mental Health History:     Last Reviewed on 7/05/2018 03:11 PM by Elise Cueto        Communicable Diseases (eg STDs):     Last Reviewed on 7/05/2018 03:11 PM by Elise Cueto            Current Problems:     Last Reviewed on 3/14/2018 02:04 PM by Elise Cueto    Thyroid nodule     Migraine, unspecified, without mention of intractable migraine without mention of status migrainosus     Esophageal stricture     Osteopenia     Use of high risk medications     Hypothyroidism     Hyperlipidemia     Classic migraine     Upper back pain     Dizziness     Rosacea     Lipomatosis     Asthma     Essential hypertension     Irritable bowel syndrome     GERD     Chronic low back pain     BPPV         Immunizations:     None        Allergies:     Last Reviewed on 3/14/2018 02:04 PM by Elise Cueto    Penicillins:    Morphine Sulfate: migraine headache    Topiramate: (Adverse Reaction)    Zonisamide: diarrhea, stomach pain (Adverse Reaction)        Current Medications:     Last Reviewed on 3/14/2018 02:04 PM by Elise Cueto    Pravastatin 40mg Tablet 1 tab daily     Migranal 4mg/1ml Nasal Spray 1 spray in each nostril, repeat in 15min, no more than 6 sprays/day & 8 sprays/week     Tizanidine HCl 4mg Capsules QID     Levoxyl 0.05mg Tablet Take 1 tablet(s) by mouth daily     Meloxicam 15mg Tablet 1 tab daily     Metronidazole 0.75% Cream Apply thin film to affected area am and pm     Dicyclomine HCl 10mg Capsules Take 1 capsule(s) by mouth bid prn cramping     Ventolin HFA 90mcg/1actuation Oral Inhaler 1 puff inhaled every 4-6 hours prn SOB/wheezing     Voltaren  1% Topical  Gel Apply 4 g to affected area up to 4 times a day     Azelastine HCl 0.1% Nasal Spray 1 spray each nostril BID     Prilosec 40mg Capsules, Extended Release 1 tab daily     Acetaminophen 500mg Tablet 1 TAB PO TID     Caltrate 600 + D 600mg/800IU Tablet Take 1 tablet(s) by mouth daily     Diphenhydramine HCl 25mg Capsules 1 capsule q am daily     Vitamin D 500mg daily         OBJECTIVE:        Vitals:         Current: 7/5/2018 2:51:46 PM    Ht:  5 ft, 4 in;  Wt: 205.2 lbs;  BMI: 35.2    T: 98.6 F (oral);  BP: 138/76 mm Hg (left arm, sitting);  P: 104 bpm (left arm (BP Cuff), sitting);  sCr: 0.76 mg/dL;  GFR: 90.42        Exams:     PHYSICAL EXAM:     GENERAL: vital signs recorded - well developed, well nourished;  well groomed;  no apparent distress;     EYES: extraocular movements intact; conjunctiva and cornea are normal; PERRLA;     E/N/T: OROPHARYNX:  normal mucosa, dentition, gingiva, and posterior pharynx;     RESPIRATORY: normal respiratory rate and pattern with no distress; normal breath sounds with no rales, rhonchi, wheezes or rubs;     CARDIOVASCULAR: normal rate; rhythm is regular;  no systolic murmur; no edema;     GASTROINTESTINAL: nontender; normal bowel sounds;     MUSCULOSKELETAL: normal gait; normal overall tone         ASSESSMENT           346.90   G43.909  Migraine, unspecified, without mention of intractable migraine without mention of status migrainosus              DDx:     401   I10  Essential hypertension              DDx:     244.9   E03.9  Hypothyroidism              DDx:     564.1   K58.0  Irritable bowel syndrome              DDx:         ORDERS:         Meds Prescribed:       Zofran ODT (Ondansetron HCl) 4mg Tablets, Orally Disintegrating 1 tab every 8 hours PRN for nausea/vomiting  #30 (Thirty) tablet(s) Refills: 0         Lab Orders:       [id number]  [facility] Basic Metabolic Panel  (Send-Out)         00681  TSH - [facility] TSH  (Send-Out)         APPTO  Appointment need  (In-House)          97681  FT4 - [facility] Thyroxine, free T4  (Send-Out)                   PLAN:          Migraine, unspecified, without mention of intractable migraine without mention of status migrainosus Cristal remains fairly stable on her medication regimen for migraines.  She is now following with Dr. Pierre who is doing occipital nerve blocks for her and those seem to have helped somewhat.  However, she is having worsening paresthesias in all four limbs.  The plan is for Dr. Pierre to do NCVs at her visit next month.  No refills needed on meds today.  She is having some increased nausea, both at baseline and with her migraines.  Rx sent in for Zofran for her to try prn.  RTC 3 months or sooner prn.         FOLLOW-UP: Schedule a follow-up visit in 3 months..  f/u migraines with Maciuba           Orders:       APPTO  Appointment need  (In-House)             Patient Education Handouts:       Migraine Headache           Essential hypertension BP at goal.  No refills needed.  Checking labs.          Hypothyroidism No refills needed.  Checking labs.     LABORATORY:  Labs ordered to be performed today include basic metabolic panel, Free T4, and TSH.            Orders:       [id number]  BMP - [facility] Basic Metabolic Panel  (Send-Out)         21898  TSH - [facility] TSH  (Send-Out)         91972  FT4 - [facility] Thyroxine, free T4  (Send-Out)            Irritable bowel syndrome Rx sent for nausea per migraine plan.           Prescriptions:       Zofran ODT (Ondansetron HCl) 4mg Tablets, Orally Disintegrating 1 tab every 8 hours PRN for nausea/vomiting  #30 (Thirty) tablet(s) Refills: 0             Patient Recommendations:        For  Migraine, unspecified, without mention of intractable migraine without mention of status migrainosus:     Schedule a follow-up visit in 3 months.                APPOINTMENT INFORMATION:        Monday Tuesday Wednesday Thursday Friday Saturday Sunday            Time:___________________AM  PM   Date:_____________________              CHARGE CAPTURE           **Please note: ICD descriptions below are intended for billing purposes only and may not represent clinical diagnoses**        Primary Diagnosis:         346.90 Migraine, unspecified, without mention of intractable migraine without mention of status migrainosus            G43.909    Migraine, unspecified, not intractable, without status migrainosus              Orders:          [id number]   Office/outpatient visit; established patient, level 4  (In-House)             APPTO   Appointment need  (In-House)           401 Essential hypertension            I10    Essential (primary) hypertension    244.9 Hypothyroidism            E03.9    Hypothyroidism, unspecified    564.1 Irritable bowel syndrome            K58.0    Irritable bowel syndrome with diarrhea

## 2021-05-18 NOTE — PROGRESS NOTES
Daisy Cesar  1960     Office/Outpatient Visit    Visit Date: Wed, Oct 14, 2020 11:09 am    Provider: Elise Cueto MD (Assistant: Elisa Garcia, )    Location: [facility]        Electronically signed by Elise Cueto MD on  10/14/2020 12:53:39 PM                             Subjective:        CC: Cristal is a 60 year old White female.  2 month follow up;         HPI: Cristal is here today for routine follow-up of chronic issues.          She continues to struggle with refractory migraine headaches for which she follows with Neurology.  She is on meloxicam, amitriptyline, Migranal, and tizanidine.  She only uses the Migranal when the headaches are frontal.  She has been on Lyrica in the past but stopped because it caused blurry vision.  Flector patches have worked well for her but insurance has refused to cover these.  She has found some relief from topical diclofenac gel applied to the back of the neck.  Maxalt and Imitrex were too sedating.  Naratriptan increased her BP.  Zonisamide caused stomach pain and diarrhea.  Topiramate worsened her BPPV.  She is following with Dr. Pierre for occipital nerve blocks, now Q2 months.  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  However, Dr. Quispe evaluated her and told her that these are not in fact lipomas.  However, she is not sure what they are.  She has had some episodes of ataxia associated with the headaches, although not every time she has a headache.  Worse on the left.  She has been diagnosed with BPPV by ENT in the past.        She is on levothyroxine for hypothyroidism.  No heat/cold intolerance, changes in hair or skin.        She is on pravastatin for cholesterol.  No myalgias or other adverse effects.        She is on omeprazole for GERD.  No reflux or indigestion.  She is on dicyclomine for IBS and abdominal cramping.    ROS:     CONSTITUTIONAL:  Positive for fatigue.    Negative for fever.      EYES:  Negative for blurred vision.      E/N/T:  Positive for tinnitus.   Negative for diminished hearing or nasal congestion.      CARDIOVASCULAR:  Negative for chest pain, orthopnea, palpitations, paroxysmal nocturnal dyspnea, pedal edema and tachycardia.      RESPIRATORY:  Negative for recent cough and dyspnea.      GASTROINTESTINAL:  Positive for constipation and nausea.   Negative for abdominal pain, diarrhea or vomiting.      MUSCULOSKELETAL:  Positive for arthralgias and back pain.   Negative for myalgias.      INTEGUMENTARY/BREAST:  Positive for lipomatosis; tender.      NEUROLOGICAL:  Positive for headaches, paresthesia ( bilateral upper extremity; bilateral lower extremity ), vertigo and weakness ( bilateral upper extremity ).   Negative for dizziness.          Past Medical History / Family History / Social History:         Last Reviewed on 10/14/2020 11:18 AM by Elise Cueto    Past Medical History:                 PAST MEDICAL HISTORY         Hypertension     Asthma     Gastroesophageal Reflux Disease    Irritable Bowel Syndrome     Migraine Headaches         PREVENTIVE HEALTH MAINTENANCE             BONE DENSITY: was last done 9/24/2020 with the following abnormality noted-- osteopenia     COLORECTAL CANCER SCREENING: colonoscopy with normal results; 2000     Hepatitis C Medicare Screening: was last done 12/2017     MAMMOGRAM: Done within last 2 years and results in are chart was last done 9/24/2020 with normal results     PAP SMEAR: No longer indicated due to age and history         PAST MEDICAL HISTORY             CURRENT MEDICAL PROVIDERS:    Primary care provider ( Dr. Cueto )    Cardiologist: Dr. Amezcua    E/N/T: Dr. Lomeli    Neurologist: Dr. Pierre         Surgical History:         Hysterectomy: for endometriosis;     Tonsillectomy     Cholecystectomy    lipoma removal;     C4-C5 and C5-C6 fusion;    R and L carpal tunnel release;    R ulnar nerve release;    R  knee surgery;         Family History:     Father: Hypertension;  bladder     Brother(s): Hepatic Carcinoma     Sister(s): thyroid CA     Paternal Grandfather: Coronary Artery Disease     Maternal Grandmother: Breast Cancer         Social History:     Occupation:    Disabled. formerly in hospital administration;     Marital Status: Single     Children: None         Tobacco/Alcohol/Supplements:     Last Reviewed on 10/14/2020 11:18 AM by Elise Cueto    Tobacco: She has never smoked.          Substance Abuse History:     Last Reviewed on 10/14/2020 11:18 AM by Elise Cueto        Mental Health History:     Last Reviewed on 10/14/2020 11:18 AM by Elise Cueto        Communicable Diseases (eg STDs):     Last Reviewed on 10/14/2020 11:18 AM by Elise Cueto        Current Problems:     Last Reviewed on 8/05/2020 09:55 AM by Elise Cueto    Asthma - Unspecified asthma, uncomplicated    GERD - Gastro-esophageal reflux disease without esophagitis    IBS - Irritable bowel syndrome with diarrhea    Low back pain    Lipomatosis, not elsewhere classified    Rosacea, unspecified    Upper back pain - Pain in thoracic spine    HLD - Mixed hyperlipidemia    Migraine without aura, not intractable, without status migrainosus    Hypothyroidism, unspecified    BPPV - Benign paroxysmal vertigo, left ear    Esophageal stricture - Esophageal obstruction    Other long term (current) drug therapy    Osteopenia - Other specified disorders of bone density and structure, other site    Nontoxic single thyroid nodule    Primary generalized (osteo)arthritis    Ataxia, unspecified        Immunizations:     None        Allergies:     Last Reviewed on 8/05/2020 09:55 AM by Elise Cueto    Penicillins:      Morphine Sulfate: migraine headache     Topiramate:   (Adverse Reaction)    Zonisamide: diarrhea,Stomach pain  (Adverse Reaction)    Zofran ODT: palpitations  (Adverse Reaction)        Current Medications:     Last Reviewed on  8/05/2020 09:55 AM by Elise Cueto    Migranal 0.5 mg/pump act. (4 mg/mL) Intranasal Spray, Non-Aerosol [1 spray in each nostril, repeat in 15min, no more than 6 sprays/day & 8 sprays/week]    tiZANidine 4 mg oral capsule [QID]    meloxicam 15 mg oral tablet [1 tab daily]    amitriptyline 100 mg oral tablet [Take 1 tablet(s) by mouth at bedtime]    LevoxyL 50 mcg oral tablet [Take 1 tablet(s) by mouth daily]    Vitamin D 500mg daily     Caltrate with Vitamin D3 600 mg(1,500mg) -800 unit oral tablet [Take 1 tablet(s) by mouth daily]    Acetaminophen 500 mg oral tablet [1 TAB PO TID ]    pravastatin 40 mg oral tablet [1 tab daily]    Ventolin HFA 90 mcg/actuation Inhalation HFA Aerosol Inhaler [1 puff inhaled every 4-6 hours prn SOB/wheezing]    Voltaren  1 % Topical Gel [Apply 4 g to affected area up to 4 times a day ]    promethazine 25 mg oral tablet [1 PO Q 8 Hrs PRN Nausea]    Metronidazole 0.75 % Topical Cream [Apply thin film to affected area am and pm]    omeprazole 40 mg oral capsule,delayed release (enteric coated) [1 capsule daily]    dicyclomine 10 mg oral capsule [Take 1 capsule(s) by mouth bid prn cramping]    diclofenac epolamine 1.3 % Transdermal Patch, Transdermal 12 Hours [apply 1 patch (180 mg) to most painful area by transdermal route 2 times per day]        Objective:        Vitals:         Current: 10/14/2020 11:13:38 AM    Ht:  5 ft, 4 in;  Wt: 202.8 lbs;  BMI: 34.8T: 96.5 F (oral);  BP: 139/71 mm Hg (right arm, sitting);  P: 97 bpm (right arm (BP Cuff), sitting);  sCr: 0.69 mg/dL;  GFR: 96.72        Exams:     PHYSICAL EXAM:     GENERAL: vital signs recorded - well developed, well nourished;  well groomed;  no apparent distress;     EYES: extraocular movements intact; conjunctiva and cornea are normal; PERRLA;     E/N/T: OROPHARYNX:  normal mucosa, dentition, gingiva, and posterior pharynx;     RESPIRATORY: normal respiratory rate and pattern with no distress; normal breath sounds with no  rales, rhonchi, wheezes or rubs;     CARDIOVASCULAR: normal rate; rhythm is regular;  no systolic murmur; no edema;     GASTROINTESTINAL: nontender; normal bowel sounds;     MUSCULOSKELETAL: normal gait; normal overall tone     NEUROLOGIC: mental status: alert and oriented x 3; Reflexes: brachioradialis: 2+; knee jerks: 2+;     PSYCHIATRIC: appropriate affect and demeanor; normal psychomotor function; normal speech pattern;         Procedures:     Migraine without aura, not intractable, without status migrainosus    1. Toradol 30mg/mL 1mL given given IM in the left hip; administered by atc;  lot number -[id number]; expires 9/1/21             Assessment:         G43.009   Migraine without aura, not intractable, without status migrainosus       K58.0   IBS - Irritable bowel syndrome with diarrhea       M25.59   Pain in other specified joint           ORDERS:         Meds Prescribed:       [Refilled] amitriptyline 100 mg oral tablet [Take 1 tablet(s) by mouth at bedtime], #90 (ninety) tablets, Refills: 1 (one)       [Refilled] promethazine 25 mg oral tablet [1 PO Q 8 Hrs PRN Nausea], #30 (thirty) tablets, Refills: 2 (two)       [Refilled] dicyclomine 10 mg oral capsule [Take 1 capsule(s) by mouth bid prn cramping], #60 (sixty) capsules, Refills: 5 (five)         Lab Orders:       [id number]  [facility] Arthritis Profile  (Send-Out)            APPTO  Appointment need  (In-House)              Other Orders:       [id number]  Therapeutic injection  (In-House)              Toradol, per 15 mg  (x2)                  Plan:         Migraine without aura, not intractable, without status migrainosusRefills sent as below.  Migraines continue to come daily.  She is following with Dr. Pierre and going back next in Dec.  I have encouraged Cristal to discuss other options with Dr. Pierre (some of the newer maintenance medications, Botox, etc) since it does seem like she has had a progressive decline in headache control over the past  couple of years.  I will see her back after I get back from maternity leave.  She would like to get a Toradol shot today.  I have told her in the past that she can come in for a Toradol 30 mg IM injection for her classic migraines without needing to be seen by me.  Discussed with her that this would be up to the discretion of the on call doctor while I am on maternity leave.  She knows to hold her meloxicam for 24 hours after receiving Toradol IM.    Narcotic or pain medication Toradol 30 mg     FOLLOW-UP: Schedule a follow-up visit in 5 months.:.  f/u migraines with Maci[facility]          Prescriptions:       [Refilled] amitriptyline 100 mg oral tablet [Take 1 tablet(s) by mouth at bedtime], #90 (ninety) tablets, Refills: 1 (one)       [Refilled] promethazine 25 mg oral tablet [1 PO Q 8 Hrs PRN Nausea], #30 (thirty) tablets, Refills: 2 (two)           Orders:       APPTO  Appointment need  (In-House)            [id number]  Therapeutic injection  (In-House)              Toradol, per 15 mg  (x2)          IBS - Irritable bowel syndrome with diarrheaStable.  Refills sent.          Prescriptions:       [Refilled] dicyclomine 10 mg oral capsule [Take 1 capsule(s) by mouth bid prn cramping], #60 (sixty) capsules, Refills: 5 (five)         Pain in other specified jointShe does report diffuse arthralgias.  Checking inflammatory arthritis panel.    LABORATORY:  Labs ordered to be performed today include Arthritis Profile.            Orders:       [id number]  [facility] [place] Arthritis Profile  (Send-Out)                  Patient Recommendations:        For  Migraine without aura, not intractable, without status migrainosus:    Schedule a follow-up visit in 5 months.                APPOINTMENT INFORMATION:        Monday Tuesday Wednesday Thursday Friday Saturday Sunday            Time:___________________AM  PM   Date:_____________________         For  Pain in other specified joint:    I also recommend ^.              Charge Capture:          Primary Diagnosis:     G43.009  Migraine without aura, not intractable, without status migrainosus           Orders:      [id number]  Office/outpatient visit; established patient, level 4  (In-House)            APPTO  Appointment need  (In-House)            25513  Therapeutic injection  (In-House)              Toradol, per 15 mg  (x2)          K58.0  IBS - Irritable bowel syndrome with diarrhea     M25.59  Pain in other specified joint

## 2021-05-18 NOTE — PROGRESS NOTES
Daisy CesarAris 1960     Office/Outpatient Visit    Visit Date: Wed, Mar 14, 2018 01:43 pm    Provider: Elise Cueto MD (Assistant: Elisa Garcia)    Location: [facility]        Electronically signed by Elise Cueto MD on  03/14/2018 02:31:18 PM                             SUBJECTIVE:        CC:     Cristal is a 58 year old White female.  medication follow up; migraines         HPI: Cristal is here for follow-up on her refractory migraine headaches.  She is currently on amitriptyline, Migranal, and tizanidine.  She has stopped Lyrica because it was causing blurry vision.  Flector patches have worked well for her but insurance has refused to cover either these or topical diclofenac gel.  She has tried Maxalt and Imitrex, but these were too sedating.  Naratriptan increased her BP.  Topiramate worsened her BPPV and had to be stopped.  She has been seeing Dr. Pierre who wants to do an occipital nerve block for her.  She did get that done finally and it did help some, although it did not take away the pain completely.  She tried some zonisamide from Dr. Pierre but could not tolerate that.  It caused stomach pain and diarrhea.          Vertigo has improved since doing Epley maneuvers with Dr. Diane.        She is on levothyroxine for hypothyroidism.          She is on pravastatin for HLD.  No myalgias.        She is on omeprazole for GERD.     ROS:     CONSTITUTIONAL:  Negative for fatigue and fever.      EYES:  Negative for blurred vision.      E/N/T:  Positive for tinnitus.   Negative for ear pain, diminished hearing, nasal congestion, frequent rhinorrhea or sore throat.      CARDIOVASCULAR:  Negative for chest pain, orthopnea, palpitations, paroxysmal nocturnal dyspnea, pedal edema and tachycardia.      RESPIRATORY:  Negative for recent cough and dyspnea.      GASTROINTESTINAL:  Positive for abdominal pain ( epigastric; LUQ ), constipation, nausea ( with migraines ) and vomiting.   Negative  for diarrhea.      MUSCULOSKELETAL:  Positive for arthralgias and back pain.   Negative for myalgias.      INTEGUMENTARY/BREAST:  Positive for lipomatosis; tender.      NEUROLOGICAL:  Positive for headaches.   Negative for dizziness, paresthesias, vertigo or weakness.      PSYCHIATRIC:  Positive for feelings of stress.   Negative for anxiety, depression, anhedonia or sleep disturbance.          PMH/FMH/SH:     Last Reviewed on 3/14/2018 02:04 PM by Elise Cueto    Past Medical History:                 PAST MEDICAL HISTORY         Hypertension     Asthma     Gastroesophageal Reflux Disease    Irritable Bowel Syndrome     Migraine Headaches         PREVENTIVE HEALTH MAINTENANCE             BONE DENSITY: was last done 3/7/2018 with the following abnormality noted-- osteopenia     Hepatitis C Medicare Screening: was last done 12/2017     MAMMOGRAM: Done within last 2 years and results in are chart was last done 3/7/2018 with normal results     PAP SMEAR: No longer indicated due to age and history         PAST MEDICAL HISTORY             CURRENT MEDICAL PROVIDERS:    Primary care provider ( Dr. Cueto )    Cardiologist: Dr. Amezcua    E/N/T: Dr. Lomeli         Surgical History:         Hysterectomy: for endometriosis;     Tonsillectomy      Cholecystectomy    lipoma removal;      C4-C5 and C5-C6 fusion;    R and L carpal tunnel release;    R ulnar nerve release;    R knee surgery;         Family History:     Father: Hypertension;  bladder     Brother(s): Hepatic Carcinoma     Sister(s): thyroid CA     Paternal Grandfather: Coronary Artery Disease     Maternal Grandmother: Breast Cancer         Social History:     Occupation:    Disabled. formerly in hospital administration;     Marital Status: Single     Children: None         Tobacco/Alcohol/Supplements:     Last Reviewed on 3/14/2018 02:04 PM by Elise Cueto    Tobacco: She has never smoked.          Substance Abuse History:     Last Reviewed on 3/14/2018 02:04  PM by Elise Cueto        Mental Health History:     Last Reviewed on 3/14/2018 02:04 PM by Elise Cueto        Communicable Diseases (eg STDs):     Last Reviewed on 3/14/2018 02:04 PM by Elise Cueto            Current Problems:     Last Reviewed on 12/13/2017 01:52 PM by Elise Cueto    Migraine, unspecified, without mention of intractable migraine without mention of status migrainosus     History of noncompliance with medical treatment     Esophageal stricture     Osteopenia     Use of high risk medications     Hypothyroidism     Hyperlipidemia     Classic migraine     Upper back pain     Dizziness     Rosacea     Lipomatosis     Asthma     Essential hypertension     Irritable bowel syndrome     GERD     Chronic low back pain     BPPV         Immunizations:     None        Allergies:     Last Reviewed on 3/14/2018 01:47 PM by Elisa Garcia    Penicillins:    Morphine Sulfate: migraine headache    Topiramate: (Adverse Reaction)        Current Medications:     Last Reviewed on 3/14/2018 01:51 PM by Elisa Garcia    Levoxyl 0.05mg Tablet Take 1 tablet(s) by mouth daily     Meloxicam 15mg Tablet 1 tab daily     Tizanidine HCl 4mg Capsules QID     Migranal 4mg/1ml Nasal Spray 1 spray in each nostril, repeat in 15min, no more than 6 sprays/day & 8 sprays/week     Metronidazole 0.75% Cream Apply thin film to affected area am and pm     Amitriptyline HCl 100mg Tablet Take 1 tablet(s) by mouth at bedtime     Omeprazole 40mg Capsules, Extended Release 1 capsule daily     Ventolin HFA 90mcg/1actuation Oral Inhaler 1 puff inhaled every 4-6 hours prn SOB/wheezing     Flector 1.3% Adhesive Patch applly one patch to affected area daily (12 hours on, 12 hours off) prn pain     Azelastine HCl 0.1% Nasal Spray 1 spray each nostril BID     Acetaminophen 500mg Tablet 1 TAB PO TID     Caltrate 600 + D 600mg/800IU Tablet Take 1 tablet(s) by mouth daily     Diphenhydramine HCl 25mg Capsules 1 capsule q am daily     Vitamin D  500mg daily         OBJECTIVE:        Vitals:         Current: 3/14/2018 1:46:24 PM    Ht:  5 ft, 4 in;  Wt: 205.4 lbs;  BMI: 35.3    T: 97.8 F (oral);  BP: 116/64 mm Hg (left arm, sitting);  P: 77 bpm (left arm (BP Cuff), sitting);  sCr: 0.76 mg/dL;  GFR: 90.46        Exams:     PHYSICAL EXAM:     GENERAL: vital signs recorded - well developed, well nourished;  well groomed;  no apparent distress;     EYES: extraocular movements intact; conjunctiva and cornea are normal; PERRLA;     E/N/T: OROPHARYNX:  normal mucosa, dentition, gingiva, and posterior pharynx;     RESPIRATORY: normal respiratory rate and pattern with no distress; normal breath sounds with no rales, rhonchi, wheezes or rubs;     CARDIOVASCULAR: normal rate; rhythm is regular;  no systolic murmur; no edema;     GASTROINTESTINAL: nontender; normal bowel sounds;     MUSCULOSKELETAL: normal gait; normal overall tone         ASSESSMENT           346.00   G43.009  Classic migraine              DDx:     401   I10  Essential hypertension              DDx:     272.4   E78.2  Hyperlipidemia              DDx:     244.9   E03.9  Hypothyroidism              DDx:     386.11   H81.12  BPPV              DDx:     241.0   E04.1  Thyroid nodule              DDx:         ORDERS:         Meds Prescribed:       Voltaren  (Diclofenac Sodium) 1% Topical Gel Apply 4 g to affected area up to 4 times a day  #100 ([place]) gm Refills: 2       Refill of: Amitriptyline HCl 100mg Tablet Take 1 tablet(s) by mouth at bedtime  #90 ([place]) tablet(s) Refills: 1         Radiology/Test Orders:       [id number]  US, soft tissues of head & neck (eg, thyroid, parathyroid, parotid), real time w/image documentation  (Send-Out)                   PLAN:          Classic migraine Doing somewhat better recently.  Currently on amitriptyline, tizanidine and she was able to get Migranal.  She still doesn't have a topical agent to use, so will try again to see if insurance will cover Voltaren gel.   They declined Flector patches.  She is no longer using Lyrica d/t side effects.  Following with Dr. Pierre who is now doing occipital nerve blocks and she did get some relief out of the first one there.          **I have been allowing her to come in for Toradol injections without being seen if she is having her typical migraine headaches and that has worked well.  She doesn't need them very often but it does keep her out of the ED.  She wonders if she will be able to do that while I am on maternity.  Discussed that it would be up to the comfort level of the on call doctor but she can call Cindy if she feels like she needs an injection and have her check with the on call doctor.**         FOLLOW-UP: Schedule a follow-up visit in 3 months.            Prescriptions:       Voltaren  (Diclofenac Sodium) 1% Topical Gel Apply 4 g to affected area up to 4 times a day  #100 ([place]) gm Refills: 2       Refill of: Amitriptyline HCl 100mg Tablet Take 1 tablet(s) by mouth at bedtime  #90 ([place]inety) tablet(s) Refills: 1           Patient Education Handouts:       [facility] [place]           Essential hypertension BP at goal.  She is not currently using medications.          Hyperlipidemia Stable.  No refills needed.  Labs UTD.          Hypothyroidism Stable.  No refills needed.  Labs UTD.          BPPV Following [facility] Dr. Diane.  [facility]          Thyroid nodule Repeat thyroid U/S ordered for yearly f/u.         RADIOLOGY:  I have ordered Thyroid Ultrasound to be done today.            Orders:       [id number]  US, soft tissues of head & neck (eg, thyroid, parathyroid, parotid), real time w/image documentation  (Send-Out)               Patient Recommendations:        For  Classic migraine:     Schedule a follow-up visit in 3 months.              CHARGE CAPTURE           **Please note: ICD descriptions below are intended for billing purposes only and may not represent clinical diagnoses**        Primary  Diagnosis:         346.00 Classic migraine            G43.009    Migraine without aura, not intractable, without status migrainosus              Orders:          [id number]   Office/outpatient visit; established patient, level 4  (In-House)           401 Essential hypertension            I10    Essential (primary) hypertension    272.4 Hyperlipidemia            E78.2    Mixed hyperlipidemia    244.9 Hypothyroidism            E03.9    Hypothyroidism, unspecified    386.11 BPPV            H81.12    Benign paroxysmal vertigo, left ear    241.0 Thyroid nodule            E04.1    Nontoxic single thyroid nodule

## 2021-05-18 NOTE — PROGRESS NOTES
Daisy CesarAris 1960     Office/Outpatient Visit    Visit Date: Wed, May 1, 2019 01:25 pm    Provider: Elise Cueto MD (Assistant: Destini Otero MA)    Location: [facility]        Electronically signed by Elise Cueto MD on  05/01/2019 01:52:55 PM                             SUBJECTIVE:        CC:     Cristal is a 59 year old White female.  Patient presents today for three month follow up;         HPI: Cristal is here for routine follow-up on chronic issues.          Her main issues is refractory migraine headaches.  She is currently on meloxicam, amitriptyline, Migranal, and tizanidine.  She was previously on Lyrica but stopped because it caused blurry vision.  Flector patches have worked well for her but insurance has refused to cover these.  She has found some relief from topical diclofenac gel applied to the back of the neck.  Maxalt and Imitrex were too sedating.  Naratriptan increased her BP.  Zonisamide caused stomach pain and diarrhea.  Topiramate worsened her BPPV.  She is following with Dr. Pierre for occipital nerve blocks.  She did go to see Dr. Quispe who told her that she does not have lipomatosis.  She recommended imaging to follow up on this.        She is on levothyroxine for hypothyroidism.  No heat/cold intolerance, diarrhea or constipation, changes in hair or skin.  Labs are UTD.        She is on pravastatin for HLD.  No myalgias or other adverse effects.        She is on omeprazole for GERD.  No indigestion or reflux.  She is on dicyclomine for IBS and abdominal cramping.     ROS:     CONSTITUTIONAL:  Positive for fatigue.   Negative for fever.      EYES:  Negative for blurred vision.      E/N/T:  Positive for tinnitus.   Negative for diminished hearing or nasal congestion.      CARDIOVASCULAR:  Negative for chest pain, orthopnea, palpitations, paroxysmal nocturnal dyspnea, pedal edema and tachycardia.      RESPIRATORY:  Negative for recent cough and dyspnea.       GASTROINTESTINAL:  Positive for constipation and nausea ( with migraines ).   Negative for abdominal pain, diarrhea or vomiting.      GENITOURINARY:  Negative for dysuria and urinary incontinence.      MUSCULOSKELETAL:  Positive for arthralgias and back pain.   Negative for myalgias.      INTEGUMENTARY/BREAST:  Positive for lipomatosis; tender.      NEUROLOGICAL:  Positive for headaches, paresthesia ( bilateral upper extremity; bilateral lower extremity ) and weakness ( bilateral upper extremity ).   Negative for dizziness or vertigo (resolved s/p Epley maneuvers with ENT).          PMH/FMH/SH:     Last Reviewed on 5/01/2019 01:38 PM by Elise Cueto    Past Medical History:                 PAST MEDICAL HISTORY         Hypertension     Asthma     Gastroesophageal Reflux Disease    Irritable Bowel Syndrome     Migraine Headaches         PREVENTIVE HEALTH MAINTENANCE             BONE DENSITY: was last done 3/7/2018 with the following abnormality noted-- osteopenia     COLORECTAL CANCER SCREENING: colonoscopy with normal results; 2000     Hepatitis C Medicare Screening: was last done 12/2017     MAMMOGRAM: Done within last 2 years and results in are chart was last done 3/11/2019 with normal results     PAP SMEAR: No longer indicated due to age and history         PAST MEDICAL HISTORY             CURRENT MEDICAL PROVIDERS:    Primary care provider ( Dr. Cueto )    Cardiologist: Dr. Amezcua    E/N/T: Dr. Lomeli    Neurologist: Dr. Pierre         Surgical History:         Hysterectomy: for endometriosis;     Tonsillectomy      Cholecystectomy    lipoma removal;      C4-C5 and C5-C6 fusion;    R and L carpal tunnel release;    R ulnar nerve release;    R knee surgery;         Family History:     Father: Hypertension;  bladder     Brother(s): Hepatic Carcinoma     Sister(s): thyroid CA     Paternal Grandfather: Coronary Artery Disease     Maternal Grandmother: Breast Cancer         Social History:     Occupation:     Disabled. formerly in hospital administration;     Marital Status: Single     Children: None         Tobacco/Alcohol/Supplements:     Last Reviewed on 5/01/2019 01:38 PM by Elise Cueto    Tobacco: She has never smoked.          Substance Abuse History:     Last Reviewed on 5/01/2019 01:38 PM by Elise Cueto        Mental Health History:     Last Reviewed on 5/01/2019 01:38 PM by Elise Cueto        Communicable Diseases (eg STDs):     Last Reviewed on 5/01/2019 01:38 PM by Elise Cueto            Current Problems:     Last Reviewed on 2/01/2019 03:38 PM by Elise Cueto    Generalized osteoarthritis, site unspecified     Lower back pain     Screening for cancer of colon     Thyroid nodule     Migraine, unspecified, without mention of intractable migraine without mention of status migrainosus     Esophageal stricture     Osteopenia     Use of high risk medications     Hypothyroidism     Hyperlipidemia     Classic migraine     Upper back pain     Dizziness     Rosacea     Lipomatosis     Asthma     Essential hypertension     Irritable bowel syndrome     GERD     Chronic low back pain     BPPV         Immunizations:     None        Allergies:     Last Reviewed on 2/01/2019 03:38 PM by Elise Cueto    Penicillins:    Morphine Sulfate: migraine headache    Topiramate: (Adverse Reaction)    Zonisamide: diarrhea, stomach pain (Adverse Reaction)    Zofran ODT: palpitations (Adverse Reaction)    Zofran ODT: palpitations (Adverse Reaction)        Current Medications:     Last Reviewed on 2/01/2019 03:38 PM by Elise Cueto    Levoxyl 0.05mg Tablet Take 1 tablet(s) by mouth daily     Meloxicam 15mg Tablet 1 tab daily     Pravastatin 40mg Tablet 1 tab daily     Tizanidine HCl 4mg Capsules QID     Azelastine HCl 0.1% Nasal Spray 1 spray each nostril BID     Amitriptyline HCl 100mg Tablet Take 1 tablet(s) by mouth at bedtime     Migranal 4mg/1ml Nasal Spray 1 spray in each nostril, repeat in 15min, no more  than 6 sprays/day & 8 sprays/week     Voltaren  1% Topical Gel Apply 4 g to affected area up to 4 times a day     Metronidazole 0.75% Cream Apply thin film to affected area am and pm     Dicyclomine HCl 10mg Capsules Take 1 capsule(s) by mouth bid prn cramping     Ventolin HFA 90mcg/1actuation Oral Inhaler 1 puff inhaled every 4-6 hours prn SOB/wheezing     Promethazine HCl 25mg Tablet 1 PO Q 8 Hrs PRN Nausea     Prilosec 40mg Capsules, Extended Release 1 tab daily     Acetaminophen 500mg Tablet 1 TAB PO TID     Caltrate 600 + D 600mg/800IU Tablet Take 1 tablet(s) by mouth daily     Diphenhydramine HCl 25mg Capsules 1 capsule q am daily     Vitamin D 500mg daily         OBJECTIVE:        Vitals:         Current: 5/1/2019 1:29:11 PM    Ht:  5 ft, 4 in;  Wt: 205 lbs;  BMI: 35.2    T: 98.3 F (oral);  BP: 129/81 mm Hg (right arm, sitting);  P: 103 bpm (right arm (BP Cuff), sitting);  sCr: 0.65 mg/dL;  GFR: 104.41        Exams:     PHYSICAL EXAM:     GENERAL: vital signs recorded - well developed, well nourished;  well groomed;  no apparent distress;     EYES: extraocular movements intact; conjunctiva and cornea are normal; PERRLA;     E/N/T: OROPHARYNX:  normal mucosa, dentition, gingiva, and posterior pharynx;     RESPIRATORY: normal respiratory rate and pattern with no distress; normal breath sounds with no rales, rhonchi, wheezes or rubs;     CARDIOVASCULAR: normal rate; rhythm is regular;  no systolic murmur; no edema;     GASTROINTESTINAL: nontender; normal bowel sounds;     MUSCULOSKELETAL: normal gait; normal overall tone         ASSESSMENT           346.90   G43.909  Migraine, unspecified, without mention of intractable migraine without mention of status migrainosus              DDx:     272.4   E78.2  Hyperlipidemia              DDx:     244.9   E03.9  Hypothyroidism              DDx:     530.81   K21.9  GERD              DDx:     564.1   K58.0  Irritable bowel syndrome              DDx:     784.2   R22.1  Neck  mass              DDx:         ORDERS:         Meds Prescribed:       Refill of: Metronidazole 0.75% Cream Apply thin film to affected area am and pm  #45 ([place]) gm Refills: 2         Radiology/Test Orders:       [id number]  Computed tomography, soft tissue neck; with contrast material(s)  (Send-Out)                   PLAN:          Migraine, unspecified, without mention of intractable migraine without mention of status migrainosus Migraines are slightly worse than usual, but she is still able to function.  She continues Migranal, tizanidine, amitriptyline  and meloxicam.  She continues to follow with Dr. Pierre for occipital neuralgias.  She went to see Dr. Quispe, who told her that the neck lesions are not lipomas, as Cristal has always been told.  Will get a CT neck to further characterize and see if it would be feasible to remove some of the larger ones that seem to be contributing to Cristal's headaches.  RTC 3 months.          Hyperlipidemia Stable.  No refills needed.  Labs reviewed and UTD.          Hypothyroidism Stable.  No refills needed.  Labs reviewed and UTD.          GERD Stable.  No refills needed.           Neck mass         RADIOLOGY:  I have ordered a CT Soft Tissue Neck with Contrast to be done today.            Orders:       [id number]  Computed tomography, soft tissue neck; with contrast material(s)  (Send-Out)               Other Prescriptions:       Refill of: Metronidazole 0.75% Cream Apply thin film to affected area am and pm  #45 ([place]) gm Refills: 2         CHARGE CAPTURE           **Please note: ICD descriptions below are intended for billing purposes only and may not represent clinical diagnoses**        Primary Diagnosis:         346.90 Migraine, unspecified, without mention of intractable migraine without mention of status migrainosus            G43.909    Migraine, unspecified, not intractable, without status migrainosus              Orders:          [id number]   Office/outpatient  visit; established patient, level 4  (In-House)           272.4 Hyperlipidemia            E78.2    Mixed hyperlipidemia    244.9 Hypothyroidism            E03.9    Hypothyroidism, unspecified    530.81 GERD            K21.9    Gastro-esophageal reflux disease without esophagitis    564.1 Irritable bowel syndrome            K58.0    Irritable bowel syndrome with diarrhea    784.2 Neck mass            R22.1    Localized swelling, mass and lump, neck

## 2021-06-10 DIAGNOSIS — G89.29 CHRONIC BILATERAL LOW BACK PAIN WITH SCIATICA, SCIATICA LATERALITY UNSPECIFIED: Primary | ICD-10-CM

## 2021-06-10 DIAGNOSIS — M54.40 CHRONIC BILATERAL LOW BACK PAIN WITH SCIATICA, SCIATICA LATERALITY UNSPECIFIED: Primary | ICD-10-CM

## 2021-06-10 RX ORDER — TIZANIDINE 4 MG/1
4 TABLET ORAL 4 TIMES DAILY
Qty: 360 TABLET | Refills: 0 | Status: SHIPPED | OUTPATIENT
Start: 2021-06-10 | End: 2021-08-04 | Stop reason: SDUPTHER

## 2021-06-10 RX ORDER — LEVOTHYROXINE SODIUM 0.05 MG/1
50 TABLET ORAL DAILY
Qty: 90 TABLET | Refills: 1 | Status: SHIPPED | OUTPATIENT
Start: 2021-06-10 | End: 2021-09-08 | Stop reason: SDUPTHER

## 2021-06-10 RX ORDER — MELOXICAM 15 MG/1
15 TABLET ORAL DAILY
Qty: 90 TABLET | Refills: 1 | Status: SHIPPED | OUTPATIENT
Start: 2021-06-10 | End: 2021-08-04

## 2021-06-10 RX ORDER — OMEPRAZOLE 40 MG/1
40 CAPSULE, DELAYED RELEASE ORAL DAILY
Qty: 90 CAPSULE | Refills: 1 | Status: SHIPPED | OUTPATIENT
Start: 2021-06-10 | End: 2021-08-04 | Stop reason: SDUPTHER

## 2021-07-01 VITALS
SYSTOLIC BLOOD PRESSURE: 134 MMHG | TEMPERATURE: 98.4 F | DIASTOLIC BLOOD PRESSURE: 67 MMHG | BODY MASS INDEX: 34.49 KG/M2 | HEART RATE: 100 BPM | HEIGHT: 64 IN | WEIGHT: 202 LBS

## 2021-07-01 VITALS
WEIGHT: 205.4 LBS | SYSTOLIC BLOOD PRESSURE: 116 MMHG | HEART RATE: 77 BPM | DIASTOLIC BLOOD PRESSURE: 64 MMHG | BODY MASS INDEX: 35.07 KG/M2 | TEMPERATURE: 97.8 F | HEIGHT: 64 IN

## 2021-07-01 VITALS
TEMPERATURE: 98.6 F | SYSTOLIC BLOOD PRESSURE: 142 MMHG | DIASTOLIC BLOOD PRESSURE: 78 MMHG | HEART RATE: 110 BPM | WEIGHT: 207.4 LBS | HEIGHT: 64 IN | BODY MASS INDEX: 35.41 KG/M2

## 2021-07-01 VITALS
DIASTOLIC BLOOD PRESSURE: 81 MMHG | BODY MASS INDEX: 35 KG/M2 | HEART RATE: 103 BPM | TEMPERATURE: 98.3 F | WEIGHT: 205 LBS | HEIGHT: 64 IN | SYSTOLIC BLOOD PRESSURE: 129 MMHG

## 2021-07-01 VITALS
HEIGHT: 64 IN | DIASTOLIC BLOOD PRESSURE: 76 MMHG | WEIGHT: 205.2 LBS | SYSTOLIC BLOOD PRESSURE: 138 MMHG | TEMPERATURE: 98.6 F | HEART RATE: 104 BPM | BODY MASS INDEX: 35.03 KG/M2

## 2021-07-01 VITALS
DIASTOLIC BLOOD PRESSURE: 78 MMHG | TEMPERATURE: 98.8 F | WEIGHT: 200 LBS | SYSTOLIC BLOOD PRESSURE: 132 MMHG | HEIGHT: 64 IN | HEART RATE: 102 BPM | BODY MASS INDEX: 34.15 KG/M2

## 2021-07-01 VITALS
DIASTOLIC BLOOD PRESSURE: 87 MMHG | HEIGHT: 64 IN | SYSTOLIC BLOOD PRESSURE: 140 MMHG | WEIGHT: 208.6 LBS | BODY MASS INDEX: 35.61 KG/M2 | TEMPERATURE: 97.7 F | HEART RATE: 105 BPM

## 2021-07-02 VITALS
BODY MASS INDEX: 35 KG/M2 | DIASTOLIC BLOOD PRESSURE: 71 MMHG | SYSTOLIC BLOOD PRESSURE: 124 MMHG | TEMPERATURE: 98.2 F | HEIGHT: 64 IN | HEART RATE: 98 BPM | WEIGHT: 205 LBS

## 2021-07-02 VITALS
HEART RATE: 97 BPM | TEMPERATURE: 96.5 F | DIASTOLIC BLOOD PRESSURE: 71 MMHG | WEIGHT: 202.8 LBS | SYSTOLIC BLOOD PRESSURE: 139 MMHG | HEIGHT: 64 IN | BODY MASS INDEX: 34.62 KG/M2

## 2021-07-02 VITALS
DIASTOLIC BLOOD PRESSURE: 76 MMHG | TEMPERATURE: 98.5 F | WEIGHT: 205.2 LBS | HEIGHT: 64 IN | BODY MASS INDEX: 35.03 KG/M2 | HEART RATE: 96 BPM | SYSTOLIC BLOOD PRESSURE: 133 MMHG

## 2021-07-26 RX ORDER — ZOLMITRIPTAN 5 MG/1
TABLET, FILM COATED ORAL
COMMUNITY
End: 2021-07-28 | Stop reason: SDUPTHER

## 2021-07-26 RX ORDER — ALBUTEROL SULFATE 90 UG/1
AEROSOL, METERED RESPIRATORY (INHALATION)
COMMUNITY
Start: 2021-06-09 | End: 2021-08-09 | Stop reason: SDUPTHER

## 2021-07-26 RX ORDER — PROMETHAZINE HYDROCHLORIDE 25 MG/1
TABLET ORAL
COMMUNITY
Start: 2021-06-07 | End: 2021-08-04 | Stop reason: SDUPTHER

## 2021-07-26 RX ORDER — DIHYDROERGOTAMINE MESYLATE 4 MG/ML
SPRAY NASAL
COMMUNITY

## 2021-07-26 RX ORDER — AMITRIPTYLINE HYDROCHLORIDE 100 MG/1
TABLET, FILM COATED ORAL
COMMUNITY
End: 2021-08-04 | Stop reason: SDUPTHER

## 2021-07-26 RX ORDER — PREGABALIN 50 MG/1
CAPSULE ORAL
COMMUNITY
End: 2021-07-28

## 2021-07-26 RX ORDER — DICYCLOMINE HYDROCHLORIDE 10 MG/1
CAPSULE ORAL
COMMUNITY
Start: 2021-06-07 | End: 2021-08-04 | Stop reason: SDUPTHER

## 2021-07-26 RX ORDER — AZELASTINE HYDROCHLORIDE 0.5 MG/ML
1 SOLUTION/ DROPS OPHTHALMIC
COMMUNITY
End: 2021-08-04

## 2021-07-26 RX ORDER — PERPHENAZINE/AMITRIPTYLINE HCL 2 MG-10 MG
TABLET ORAL
COMMUNITY
End: 2021-07-28

## 2021-07-26 RX ORDER — PRAVASTATIN SODIUM 40 MG
TABLET ORAL
COMMUNITY
End: 2021-07-28

## 2021-07-26 RX ORDER — ALBUTEROL SULFATE 8 MG/1
TABLET, FILM COATED, EXTENDED RELEASE ORAL
COMMUNITY
End: 2021-07-28 | Stop reason: SDUPTHER

## 2021-07-26 RX ORDER — ACETAMINOPHEN, ASPIRIN AND CAFFEINE 250; 250; 65 MG/1; MG/1; MG/1
TABLET, FILM COATED ORAL
COMMUNITY
End: 2021-07-28

## 2021-07-26 RX ORDER — ACETAMINOPHEN 500 MG
TABLET ORAL
COMMUNITY

## 2021-07-26 RX ORDER — ZOLMITRIPTAN 5 MG/1
SPRAY, METERED NASAL
COMMUNITY
Start: 2021-05-12 | End: 2021-08-04

## 2021-07-26 RX ORDER — DIPHENHYDRAMINE HCL 25 MG
CAPSULE ORAL
COMMUNITY

## 2021-07-26 RX ORDER — ATENOLOL 25 MG/1
TABLET ORAL
COMMUNITY
End: 2021-07-28

## 2021-07-26 RX ORDER — TOPIRAMATE 50 MG/1
TABLET, FILM COATED ORAL
COMMUNITY
End: 2021-07-28

## 2021-07-28 ENCOUNTER — OFFICE VISIT (OUTPATIENT)
Dept: OTOLARYNGOLOGY | Facility: CLINIC | Age: 61
End: 2021-07-28

## 2021-07-28 VITALS — WEIGHT: 212.7 LBS | TEMPERATURE: 97.8 F | HEIGHT: 64 IN | BODY MASS INDEX: 36.31 KG/M2

## 2021-07-28 DIAGNOSIS — H93.13 TINNITUS OF BOTH EARS: ICD-10-CM

## 2021-07-28 DIAGNOSIS — H91.93 BILATERAL HEARING LOSS, UNSPECIFIED HEARING LOSS TYPE: ICD-10-CM

## 2021-07-28 DIAGNOSIS — R42 DIZZINESS: Primary | ICD-10-CM

## 2021-07-28 PROCEDURE — 99212 OFFICE O/P EST SF 10 MIN: CPT | Performed by: OTOLARYNGOLOGY

## 2021-07-28 NOTE — PROGRESS NOTES
Patient Name: Daisy Cesar   Visit Date: 07/28/2021   Patient ID: [id number]  Provider: Gil Diane MD    Sex: female  Location: [facility]   YOB: 1960  Location Address: [address] [place] [address], [address] [place] [address],?[place]?[id number]    Primary Care Provider Elise Cueto MD  Location Phone: [phone]    Referring Provider: No ref. provider found        Chief Complaint  Dizziness    Subjective    History of Present Illness  Daisy Cesar is a 61 y.o. female who presents to [facility] today as a consult from No ref. provider found.    She presents the clinic today for evaluation of lightheaded spells that are happening every 3 to 4 days.  She describes the spells happening mostly when she arises from a laying down position.  She denies any change in hearing or ear ringing with this.  She does have some tinnitus which she describes as located like chirping in both ears.  She does have a known hearing loss, but her previous audiogram was performed in 2017.  She has noted no significant changes in her hearing.  She denies any headaches.  She notes that her balance is for the most part normal.  She informs me that she stumbles just occasionally maybe once a week.  She has sustained no falls.  She informed me that the dizzy spell lasted for about 5 minutes, and subsided on its own.  Her balance was normal afterwards.  She frequently has lightheaded spells.  She notes that she drinks adequately.  She is not on any antihypertensives, denies any blood pressure issues.  She is not very physically active, but does walk.    December 23, 2020 clinic visit:    · She presents the clinic today for follow-up regarding nasal congestion, allergic rhinitis, and sinusitis, as well as vertigo spells. She does have issues with allergic rhinitis, and was having significant left-sided facial pain and pressure after having a maxillary tooth pulled on that  side in September. I obtained a CT scan of her sinuses and reviewed the imaging today. The scan shows no significant evidence of sinusitis and is essentially completely normal as far as her sinuses and nasal anatomy is concerned. There was no evidence of soft tissue mass within the left cheek area. She notes intermittent nasal congestion which is worsened by allergies. She is doing intermittent nasal saline washes, and I will have her continue her nasal regimen.Her secondary complaint is vertigo spells which are more frequent when she rolls over in bed. This was mostly happening on the left side previously, and I have done Epley maneuvers on her for this. She notes that she has several spells per week. She describes them as short-lived vertigo. She also has lightheadedness on orthostatic changes and notes no vertigo with the spells. Repton-Hallpike maneuver revealed geotropic nystagmus with the right ear down, and Epley maneuver was performed today in the clinic. I will have her sleep in a head of bed elevated position for the next several days, and she will contact me should she have continued issues with this.    Past Medical History:   Diagnosis Date   • Acne    • Allergic rhinitis    • Allergies    • Anemia    • Arthritis    • Asthma    • Cataract    • Cervicalgia    • Change in voice    • Chronic sinusitis    • Cough    • Difficulty swallowing    • Ear pain, right    • Epistaxis, recurrent    • Facial pressure    • Head injury    • Hearing loss    • High blood pressure    • High cholesterol    • History of cold sores    • Hoarseness of voice    • Hypotension 12/07/2016   • Keloid    • Neck swelling    • Sinus drainage        Past Surgical History:   Procedure Laterality Date   • CARPAL TUNNEL RELEASE      Right and left    • CERVICAL FUSION  1992 1993    C3-4, C4-5, C5-6 in [place]    • CHOLECYSTECTOMY     • GALLBLADDER SURGERY     • HYSTERECTOMY     • KNEE SURGERY      Right and left; Arthroscopic    • SHOULDER  SURGERY      Arthroscopic; Right    • TONSILLECTOMY  03/1977   • TUMOR REMOVAL     • ULNAR NERVE TRANSPOSITION      Left   • ULNAR NERVE TRANSPOSITION      Right          Current Outpatient Medications:   •  acetaminophen (TYLENOL) 500 MG tablet, acetaminophen 500 mg oral tablet take 1 tablet (500 mg) by oral route every 3 hours as needed   Active, Disp: , Rfl:   •  amitriptyline (ELAVIL) 100 MG tablet, amitriptyline 100 mg oral tablet take 1 tablet (100 mg) by oral route once daily at bedtime   Suspended, Disp: , Rfl:   •  azelastine (OPTIVAR) 0.05 % ophthalmic solution, 1 drop., Disp: , Rfl:   •  Calcium Carb-Cholecalciferol (Caltrate 600+D3 Soft) 600-800 MG-UNIT chewable tablet, Caltrate 600 + D 600 mg (1,500 mg)-800 unit oral tablet,chewable chew 1 tablet by oral route daily   Active, Disp: , Rfl:   •  Diclofenac Sodium (VOLTAREN) 1 % gel gel, , Disp: , Rfl:   •  dicyclomine (BENTYL) 10 MG capsule, , Disp: , Rfl:   •  dihydroergotamine (Migranal) 4 MG/ML nasal spray, Migranal 0.5 mg/pump act. (4 mg/mL) nasal spray,non-aerosol spray 1 spray (0.5 mg) by intranasal route in each nostril, repeat 15 minutes later for a total of 4 sprays (2 mg); do not exceed 6 sprays (3 mg) per day or 8 sprays (4 mg) per week   Active, Disp: , Rfl:   •  diphenhydrAMINE (Benadryl Allergy) 25 mg capsule, Benadryl 25 mg oral capsule take 2 capsules (50 mg) by oral route once daily at bedtime as needed   Active, Disp: , Rfl:   •  levothyroxine (Synthroid) 50 MCG tablet, Take 1 tablet by mouth Daily., Disp: 90 tablet, Rfl: 1  •  meloxicam (Mobic) 15 MG tablet, Take 1 tablet by mouth Daily., Disp: 90 tablet, Rfl: 1  •  metroNIDAZOLE (METROCREAM) 0.75 % cream, metronidazole 0.75 % topical cream apply a thin layer to the affected area(s) by topical route 2 times per day in the morning and evening   Active, Disp: , Rfl:   •  omeprazole (priLOSEC) 40 MG capsule, Take 1 capsule by mouth Daily., Disp: 90 capsule, Rfl: 1  •  promethazine  "(PHENERGAN) 25 MG tablet, , Disp: , Rfl:   •  tiZANidine (ZANAFLEX) 4 MG tablet, Take 1 tablet by mouth 4 (Four) Times a Day for 90 days., Disp: 360 tablet, Rfl: 0  •  Ventolin  (90 Base) MCG/ACT inhaler, , Disp: , Rfl:   •  Zomig 5 MG nasal solution, , Disp: , Rfl:      Allergies   Allergen Reactions   • Morphine Unknown - Low Severity   • Penicillins Unknown - Low Severity       Family History   Problem Relation Age of Onset   • Other Father         Back Pain   • Cancer Father    • Heart failure Father    • Other Sister         Back Pain     • Cancer Sister    • Hypertension Sister    • Hypertension Mother    • Stroke Mother    • Cancer Brother    • Hypertension Brother    • Cancer Paternal Uncle    • Cancer Maternal Grandmother         Social History     Social History Narrative   • Not on file       Objective     Vital Signs:   Temp 97.8 °F (36.6 °C) (Temporal)   Ht 162.6 cm (64\")   Wt 96.5 kg (212 lb 11.2 oz)   BMI 36.51 kg/m²       Physical Exam         Constitutional   Appearance  · : well developed, well-nourished, alert and in no acute distress, voice clear and strong    Head  Inspection  · : no deformities or lesions  Face  Inspection  · : No facial lesions; House-Brackmann I/VI bilaterally  Palpation  · : No TMJ crepitus nor  muscle tenderness bilaterally    Eyes  Vision  Visual Fields  · : Extraocular movements are intact. No spontaneous or gaze-induced nystagmus.  Conjunctivae  · : clear  Sclerae  · : clear  Pupils and Irises  · : pupils equal, round, and reactive to light.     Ears, Nose, Mouth and Throat    Ears    External Ears  · : appearance within normal limits, no lesions present  Otoscopic Examination  · : Tympanic membrane appearance within normal limits bilaterally without perforations, well-aerated middle ears  Hearing  · : intact to conversational voice both ears  Tunning fork testing:     :    Nose    External Nose  · : appearance normal  Intranasal Exam  · : mucosa " within normal limits, vestibules normal, no intranasal lesions present, septum midline, sinuses non tender to percussion  Oral Cavity    Oral Mucosa  · : oral mucosa normal without pallor or cyanosis  Lips  · : lip appearance normal  Teeth  · : normal dentition for age  Gums  · : gums pink, non-swollen, no bleeding present  Tongue  · : tongue appearance normal; normal mobility  Palate  · : hard palate normal, soft palate appearance normal with symmetric mobility    Throat    Oropharynx  · : no inflammation or lesions present, tonsils within normal limits  Hypopharynx  · : appearance within normal limits, superior epiglottis within normal limits  Larynx  · : appearance within normal limits, vocal cords within normal limits, no lesions present    Neck  Inspection/Palpation  · : normal appearance, no masses or tenderness, trachea midline; thyroid size normal, nontender, no nodules or masses present on palpation    Respiratory  Respiratory Effort  · : breathing unlabored  Inspection of Chest  · : normal appearance, no retractions    Cardiovascular  Heart  · : regular rate and rhythm    Lymphatic  Neck  · : no lymphadenopathy present  Supraclavicular Nodes  · : no lymphadenopathy present  Preauricular Nodes  · : no lymphadenopathy present    Skin and Subcutaneous Tissue  General Inspection  · : Regarding face and neck - there are no rashes present, no lesions present, and no areas of discoloration    Neurologic  Cranial Nerves  · : cranial nerves II-XII are grossly intact bilaterally  Gait and Station  · : normal gait, normal tandem gait, negative Niurka-Hallpike maneuver bilaterally, no vertigo or nystagmus elicited, able to stand without diffculty    Psychiatric  Judgement and Insight  · : judgment and insight intact  Mood and Affect  · : mood normal, affect appropriate          Assessment and Plan    Diagnoses and all orders for this visit:    1. Dizziness (Primary)    2. Tinnitus of both ears    3. Bilateral hearing  loss, unspecified hearing loss type    Examination today revealed normal-looking ears with no evidence of fluid or infection.  Balance testing performed the clinic was for the most part normal.  I did discuss repeating her audiogram given her tinnitus symptoms, but because her hearing is for the most part stable, she did not want to have this done.  She hears well in day-to-day situations per her report.  I did discuss vestibular testing, but we decided to hold off for now.  Balance testing including [place]-Hallpike maneuver was performed the clinic and was negative.  I informed her that I do not think this is the same as her previous BPPV for which she successfully underwent Epley maneuver.  I think this is mostly related to lightheadedness and orthostatic hypotension.  I will have her keep track of her blood pressures.  If there is any worsening of asked her to contact me for further evaluation.    Follow Up   Return if symptoms worsen or fail to improve.  Patient was given instructions and counseling regarding her condition or for health maintenance advice. Please see specific information pulled into the AVS if appropriate.

## 2021-08-04 ENCOUNTER — OFFICE VISIT (OUTPATIENT)
Dept: FAMILY MEDICINE CLINIC | Age: 61
End: 2021-08-04

## 2021-08-04 VITALS
TEMPERATURE: 98.4 F | DIASTOLIC BLOOD PRESSURE: 57 MMHG | HEART RATE: 103 BPM | SYSTOLIC BLOOD PRESSURE: 121 MMHG | WEIGHT: 214.4 LBS | BODY MASS INDEX: 36.6 KG/M2 | HEIGHT: 64 IN

## 2021-08-04 DIAGNOSIS — M54.40 CHRONIC BILATERAL LOW BACK PAIN WITH SCIATICA, SCIATICA LATERALITY UNSPECIFIED: ICD-10-CM

## 2021-08-04 DIAGNOSIS — K21.9 GASTROESOPHAGEAL REFLUX DISEASE WITHOUT ESOPHAGITIS: ICD-10-CM

## 2021-08-04 DIAGNOSIS — K58.0 IRRITABLE BOWEL SYNDROME WITH DIARRHEA: ICD-10-CM

## 2021-08-04 DIAGNOSIS — E78.2 MIXED HYPERLIPIDEMIA: ICD-10-CM

## 2021-08-04 DIAGNOSIS — G43.119 INTRACTABLE MIGRAINE WITH AURA WITHOUT STATUS MIGRAINOSUS: Primary | ICD-10-CM

## 2021-08-04 DIAGNOSIS — R30.0 DYSURIA: ICD-10-CM

## 2021-08-04 DIAGNOSIS — Z23 ENCOUNTER FOR IMMUNIZATION: ICD-10-CM

## 2021-08-04 DIAGNOSIS — E03.9 HYPOTHYROIDISM (ACQUIRED): ICD-10-CM

## 2021-08-04 DIAGNOSIS — J45.30 MILD PERSISTENT ASTHMA WITHOUT COMPLICATION: ICD-10-CM

## 2021-08-04 DIAGNOSIS — I10 ESSENTIAL HYPERTENSION: ICD-10-CM

## 2021-08-04 DIAGNOSIS — E88.2 LIPOMATOSIS: ICD-10-CM

## 2021-08-04 DIAGNOSIS — G89.29 CHRONIC BILATERAL LOW BACK PAIN WITH SCIATICA, SCIATICA LATERALITY UNSPECIFIED: ICD-10-CM

## 2021-08-04 DIAGNOSIS — S91.332A PUNCTURE WOUND OF LEFT FOOT, INITIAL ENCOUNTER: ICD-10-CM

## 2021-08-04 PROBLEM — M85.89 OSTEOPENIA OF MULTIPLE SITES: Status: ACTIVE | Noted: 2021-08-04

## 2021-08-04 PROBLEM — M15.9 GENERALIZED OSTEOARTHRITIS: Status: ACTIVE | Noted: 2021-08-04

## 2021-08-04 PROBLEM — J30.9 ALLERGIC RHINITIS: Status: ACTIVE | Noted: 2021-08-04

## 2021-08-04 PROBLEM — E78.00 HIGH CHOLESTEROL: Status: ACTIVE | Noted: 2021-08-04

## 2021-08-04 PROBLEM — D17.30 LIPOMA OF SKIN AND SUBCUTANEOUS TISSUE: Status: ACTIVE | Noted: 2019-10-13

## 2021-08-04 PROBLEM — G43.109 MIGRAINE WITH AURA AND WITHOUT STATUS MIGRAINOSUS, NOT INTRACTABLE: Status: ACTIVE | Noted: 2019-10-13

## 2021-08-04 PROBLEM — K22.2 ESOPHAGEAL STRICTURE: Status: ACTIVE | Noted: 2021-08-04

## 2021-08-04 PROBLEM — Z79.899 HIGH RISK MEDICATION USE: Status: ACTIVE | Noted: 2021-08-04

## 2021-08-04 PROBLEM — M54.2 NECK PAIN: Status: ACTIVE | Noted: 2021-08-04

## 2021-08-04 PROBLEM — J45.909 ASTHMA: Status: ACTIVE | Noted: 2021-08-04

## 2021-08-04 PROBLEM — L71.9 ROSACEA: Status: ACTIVE | Noted: 2021-08-04

## 2021-08-04 LAB
BACTERIA UR QL AUTO: ABNORMAL /HPF
BILIRUB UR QL STRIP: NEGATIVE
CLARITY UR: CLEAR
COLOR UR: ABNORMAL
GLUCOSE UR STRIP-MCNC: NEGATIVE MG/DL
HGB UR QL STRIP.AUTO: ABNORMAL
KETONES UR QL STRIP: NEGATIVE
LEUKOCYTE ESTERASE UR QL STRIP.AUTO: ABNORMAL
NITRITE UR QL STRIP: NEGATIVE
PH UR STRIP.AUTO: 6.5 [PH] (ref 5–8)
PROT UR QL STRIP: NEGATIVE
RBC # UR: ABNORMAL /HPF
REF LAB TEST METHOD: ABNORMAL
SP GR UR STRIP: <=1.005 (ref 1–1.03)
SQUAMOUS #/AREA URNS HPF: ABNORMAL /HPF
UROBILINOGEN UR QL STRIP: ABNORMAL
WBC UR QL AUTO: ABNORMAL /HPF

## 2021-08-04 PROCEDURE — 90714 TD VACC NO PRESV 7 YRS+ IM: CPT | Performed by: FAMILY MEDICINE

## 2021-08-04 PROCEDURE — 81001 URINALYSIS AUTO W/SCOPE: CPT | Performed by: FAMILY MEDICINE

## 2021-08-04 PROCEDURE — 99214 OFFICE O/P EST MOD 30 MIN: CPT | Performed by: FAMILY MEDICINE

## 2021-08-04 PROCEDURE — 90471 IMMUNIZATION ADMIN: CPT | Performed by: FAMILY MEDICINE

## 2021-08-04 PROCEDURE — 87086 URINE CULTURE/COLONY COUNT: CPT | Performed by: FAMILY MEDICINE

## 2021-08-04 PROCEDURE — 87088 URINE BACTERIA CULTURE: CPT | Performed by: FAMILY MEDICINE

## 2021-08-04 PROCEDURE — 87186 SC STD MICRODIL/AGAR DIL: CPT | Performed by: FAMILY MEDICINE

## 2021-08-04 RX ORDER — AMITRIPTYLINE HYDROCHLORIDE 100 MG/1
100 TABLET, FILM COATED ORAL NIGHTLY
Qty: 90 TABLET | Refills: 1 | Status: SHIPPED | OUTPATIENT
Start: 2021-08-04 | End: 2022-03-30

## 2021-08-04 RX ORDER — PROMETHAZINE HYDROCHLORIDE 25 MG/1
25 TABLET ORAL 2 TIMES DAILY PRN
Qty: 60 TABLET | Refills: 2 | Status: SHIPPED | OUTPATIENT
Start: 2021-08-04 | End: 2021-12-03

## 2021-08-04 RX ORDER — DICYCLOMINE HYDROCHLORIDE 10 MG/1
10 CAPSULE ORAL 2 TIMES DAILY
Qty: 180 CAPSULE | Refills: 1 | Status: SHIPPED | OUTPATIENT
Start: 2021-08-04 | End: 2022-01-04

## 2021-08-04 RX ORDER — TIZANIDINE 4 MG/1
4 TABLET ORAL 4 TIMES DAILY
Qty: 120 TABLET | Refills: 1 | Status: SHIPPED | OUTPATIENT
Start: 2021-08-04 | End: 2022-04-19

## 2021-08-04 RX ORDER — NAPROXEN 500 MG/1
500 TABLET ORAL 2 TIMES DAILY PRN
Qty: 60 TABLET | Refills: 5 | Status: SHIPPED | OUTPATIENT
Start: 2021-08-04 | End: 2022-02-08

## 2021-08-04 RX ORDER — PHENAZOPYRIDINE HYDROCHLORIDE 95 MG/1
95 TABLET ORAL 3 TIMES DAILY PRN
Qty: 10 TABLET | Refills: 0 | Status: SHIPPED | OUTPATIENT
Start: 2021-08-04 | End: 2022-09-14

## 2021-08-04 RX ORDER — OMEPRAZOLE 40 MG/1
40 CAPSULE, DELAYED RELEASE ORAL DAILY
Qty: 90 CAPSULE | Refills: 1 | Status: SHIPPED | OUTPATIENT
Start: 2021-08-04 | End: 2022-01-04

## 2021-08-04 NOTE — PROGRESS NOTES
Chief Complaint  Migraine (4 month follow up ) and Difficulty Urinating (X 1 week )    Subjective          Daisy Cesar presents to [facility] today for routine f/u of chronic issues.    She continues to struggle with refractory migraine headaches.  She follows with Dr. Allison Pierre.  She is on meloxicam, amitriptyline, Migranal, and tizanidine.  She only uses the Migranal when the headaches are frontal.  She has used Lyrica previously but stopped because it caused blurry vision.  Flector patches have worked well for her but insurance has refused to cover these.  She has found some relief from topical diclofenac gel applied to the back of the neck.  Maxalt and Imitrex were too sedating.  Naratriptan increased her BP.  Zonisamide caused stomach pain and diarrhea.  Topiramate worsened her BPPV.  She is following with Dr. Pierre for occipital nerve blocks, now Q2 months.  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  However, Dr. Quispe evaluated her and told her that these are not in fact lipomas.  However, she is not sure what they are.      She has had some episodes of ataxia associated with the headaches and vertigo.  She carries a diagnose a BPPV by ENT in the past.  She has followed with Dr. Diane and saw her a couple of weeks ago.  He thinks that the Epley maneuvers have largely been successful at preventing the crystals.        She is on levothyroxine for hypothyroidism.  She denies heat/cold intolerance, changes in hair or skin.      She is on pravastatin for hyperlipidemia.  She denies myalgias.      She is on omeprazole for GERD.  She denies indigestion or heartburn.  She is on dicyclomine for IBS and abdominal cramping.    She stepped on a praveena nail in her garage about 1 week ago.    Current Outpatient Medications:   •  acetaminophen (TYLENOL) 500 MG tablet, acetaminophen 500 mg oral tablet take 1 tablet  (500 mg) by oral route every 3 hours as needed   Active, Disp: , Rfl:   •  amitriptyline (ELAVIL) 100 MG tablet, Take 1 tablet by mouth Every Night., Disp: 90 tablet, Rfl: 1  •  Calcium Carb-Cholecalciferol (Caltrate 600+D3 Soft) 600-800 MG-UNIT chewable tablet, Caltrate 600 + D 600 mg (1,500 mg)-800 unit oral tablet,chewable chew 1 tablet by oral route daily   Active, Disp: , Rfl:   •  Diclofenac Sodium (VOLTAREN) 1 % gel gel, Apply  topically to the appropriate area as directed 2 (Two) Times a Day As Needed (neck pain)., Disp: 100 g, Rfl: 5  •  dicyclomine (BENTYL) 10 MG capsule, Take 1 capsule by mouth 2 (two) times a day., Disp: 180 capsule, Rfl: 1  •  dihydroergotamine (Migranal) 4 MG/ML nasal spray, Migranal 0.5 mg/pump act. (4 mg/mL) nasal spray,non-aerosol spray 1 spray (0.5 mg) by intranasal route in each nostril, repeat 15 minutes later for a total of 4 sprays (2 mg); do not exceed 6 sprays (3 mg) per day or 8 sprays (4 mg) per week   Active, Disp: , Rfl:   •  diphenhydrAMINE (Benadryl Allergy) 25 mg capsule, Benadryl 25 mg oral capsule take 2 capsules (50 mg) by oral route once daily at bedtime as needed   Active, Disp: , Rfl:   •  levothyroxine (Synthroid) 50 MCG tablet, Take 1 tablet by mouth Daily., Disp: 90 tablet, Rfl: 1  •  omeprazole (priLOSEC) 40 MG capsule, Take 1 capsule by mouth Daily., Disp: 90 capsule, Rfl: 1  •  promethazine (PHENERGAN) 25 MG tablet, Take 1 tablet by mouth 2 (Two) Times a Day As Needed for Nausea or Vomiting., Disp: 60 tablet, Rfl: 2  •  tiZANidine (ZANAFLEX) 4 MG tablet, Take 1 tablet by mouth 4 (Four) Times a Day., Disp: 120 tablet, Rfl: 1  •  Ventolin  (90 Base) MCG/ACT inhaler, , Disp: , Rfl:   •  naproxen (Naprosyn) 500 MG tablet, Take 1 tablet by mouth 2 (Two) Times a Day As Needed for Mild Pain . Take with food., Disp: 60 tablet, Rfl: 5  •  phenazopyridine (PYRIDIUM) 95 MG tablet, Take 1 tablet by mouth 3 (Three) Times a Day As Needed for Bladder Spasms., Disp:  "10 tablet, Rfl: 0    Allergies:  Morphine and Penicillins      Objective   Vital Signs:   /57 (BP Location: Left arm, Patient Position: Sitting)   Pulse 103   Temp 98.4 °F (36.9 °C) (Oral)   Ht 162.6 cm (64\")   Wt 97.3 kg (214 lb 6.4 oz)   BMI 36.80 kg/m²     Physical Exam  Vitals reviewed.   Constitutional:       General: She is not in acute distress.     Appearance: Normal appearance. She is well-developed.   HENT:      Head: Normocephalic and atraumatic.      Right Ear: External ear normal.      Left Ear: External ear normal.      Nose: Nose normal.      Mouth/Throat:      Mouth: Mucous membranes are moist.   Eyes:      Extraocular Movements: Extraocular movements intact.      Conjunctiva/sclera: Conjunctivae normal.      Pupils: Pupils are equal, round, and reactive to light.   Cardiovascular:      Rate and Rhythm: Normal rate and regular rhythm.      Heart sounds: No murmur heard.     Pulmonary:      Effort: Pulmonary effort is normal.      Breath sounds: Normal breath sounds. No wheezing, rhonchi or rales.   Abdominal:      General: Bowel sounds are normal. There is no distension.      Palpations: Abdomen is soft.      Tenderness: There is no abdominal tenderness.   Musculoskeletal:         General: Normal range of motion.   Neurological:      Mental Status: She is alert.   Psychiatric:         Mood and Affect: Affect normal.             Assessment and Plan    Diagnoses and all orders for this visit:    1. Intractable migraine with aura without status migrainosus (Primary)  Assessment & Plan:  Headaches are stable and continuing to give her a lot of trouble.  She is following with Dr. Allison Pierre Neurology and getting occipital nerve blocks through her.  She does need refills on some meds today as below.  This have been sent.  The nodules that she has diffusely seem to be triggering the migraines at times.  See lipomatosis plan.      Orders:  -     amitriptyline (ELAVIL) 100 MG tablet; Take 1 " tablet by mouth Every Night.  Dispense: 90 tablet; Refill: 1  -     Diclofenac Sodium (VOLTAREN) 1 % gel gel; Apply  topically to the appropriate area as directed 2 (Two) Times a Day As Needed (neck pain).  Dispense: 100 g; Refill: 5  -     promethazine (PHENERGAN) 25 MG tablet; Take 1 tablet by mouth 2 (Two) Times a Day As Needed for Nausea or Vomiting.  Dispense: 60 tablet; Refill: 2  -     tiZANidine (ZANAFLEX) 4 MG tablet; Take 1 tablet by mouth 4 (Four) Times a Day.  Dispense: 120 tablet; Refill: 1    2. Dysuria  Assessment & Plan:  UA shows trace blood and trace leukesterase only.  I will send in some phenazopyridine today to help with her symptoms and we are following the culture.  We will call her to let her know how that is looking and if she needs antibiotics or not.      Orders:  -     Urinalysis With Microscopic - Urine, Clean Catch; Future  -     Urine Culture - Urine, Urine, Clean Catch; Future  -     Urinalysis With Microscopic - Urine, Clean Catch  -     Urine Culture - Urine, Urine, Clean Catch  -     phenazopyridine (PYRIDIUM) 95 MG tablet; Take 1 tablet by mouth 3 (Three) Times a Day As Needed for Bladder Spasms.  Dispense: 10 tablet; Refill: 0    3. Mild persistent asthma without complication    4. Essential hypertension    5. Mixed hyperlipidemia    6. Hypothyroidism (acquired)  Assessment & Plan:  No refills needed today.  Labs reviewed and up-to-date.      7. Gastroesophageal reflux disease without esophagitis  Assessment & Plan:  Stable.  Refill sent.    Orders:  -     omeprazole (priLOSEC) 40 MG capsule; Take 1 capsule by mouth Daily.  Dispense: 90 capsule; Refill: 1    8. Lipomatosis  Assessment & Plan:  Cristal has previously been seen by Dr. Spivey prior to his residential, and he had diagnosed her with lipomatosis.  She saw Dr. Belem Quispe back in 2019 who did not feel that Cristal's multiple diffuse nodules were consistent with lipomas, but we were not able to get a definitive diagnosis at  that time.  Cristal does feel she is getting a lot of pain from these lesions where they press on underlying structures.  I am going to send her to Dermatology for further eval and to see if we can get a diagnosis on what they are as well as how best to deal with them.    Orders:  -     Ambulatory Referral to Dermatology    9. Chronic bilateral low back pain with sciatica, sciatica laterality unspecified  -     naproxen (Naprosyn) 500 MG tablet; Take 1 tablet by mouth 2 (Two) Times a Day As Needed for Mild Pain . Take with food.  Dispense: 60 tablet; Refill: 5    10. Irritable bowel syndrome with diarrhea  Assessment & Plan:  Stable.  Refill sent.  No recent flares.    Orders:  -     dicyclomine (BENTYL) 10 MG capsule; Take 1 capsule by mouth 2 (two) times a day.  Dispense: 180 capsule; Refill: 1    11. Puncture wound of left foot, initial encounter  -     Td Vaccine Greater Than or Equal To 8yo With Preservative IM    12. Encounter for immunization  -     Td Vaccine Greater Than or Equal To 8yo With Preservative IM      Follow Up   Return in about 4 months (around 12/4/2021) for Recheck.  Patient was given instructions and counseling regarding her condition or for health maintenance advice. Please see specific information pulled into the AVS if appropriate.

## 2021-08-04 NOTE — ASSESSMENT & PLAN NOTE
Cristal has previously been seen by Dr. Spivey prior to his [facility]ent[facility], and he had diagnosed her with lipomatosis.  She saw Dr. Belem Quispe back in 2019 who did not feel that Cristal's multiple diffuse nodules were consistent with lipomas, but we were not able to get a definitive diagnosis at that time.  Cristal does feel she is getting a lot of pain from these lesions where they press on underlying structures.  I am going to send her to Dermatology for further eval and to see if we can get a diagnosis on what they are as well as how best to deal with them.

## 2021-08-04 NOTE — ASSESSMENT & PLAN NOTE
Headaches are stable and continuing to give her a lot of trouble.  She is following with Dr. Allison Pierre Neurology and getting occipital nerve blocks through her.  She does need refills on some meds today as below.  This have been sent.  The nodules that she has diffusely seem to be triggering the migraines at times.  See lipomatosis plan.

## 2021-08-04 NOTE — ASSESSMENT & PLAN NOTE
UA shows trace blood and trace leukesterase only.  I will send in some phenazopyridine today to help with her symptoms and we are following the culture.  We will call her to let her know how that is looking and if she needs antibiotics or not.

## 2021-08-05 ENCOUNTER — TELEPHONE (OUTPATIENT)
Dept: FAMILY MEDICINE CLINIC | Age: 61
End: 2021-08-05

## 2021-08-05 DIAGNOSIS — R30.0 DYSURIA: Primary | ICD-10-CM

## 2021-08-05 DIAGNOSIS — N30.00 ACUTE CYSTITIS WITHOUT HEMATURIA: ICD-10-CM

## 2021-08-05 RX ORDER — SULFAMETHOXAZOLE AND TRIMETHOPRIM 800; 160 MG/1; MG/1
1 TABLET ORAL 2 TIMES DAILY
Qty: 6 TABLET | Refills: 0 | Status: SHIPPED | OUTPATIENT
Start: 2021-08-05 | End: 2021-08-08

## 2021-08-05 NOTE — TELEPHONE ENCOUNTER
Caller: Daisy Cesar    Relationship to patient: Self    Best call back number: [phone]     Patient is needing: PT CALLED PHOEBE BACK, PLEASE RETURN THE CALL, UNABLE TO WARM TRANSFER.  THANK YOU.

## 2021-08-06 ENCOUNTER — TELEPHONE (OUTPATIENT)
Dept: FAMILY MEDICINE CLINIC | Age: 61
End: 2021-08-06

## 2021-08-06 DIAGNOSIS — G43.119 INTRACTABLE MIGRAINE WITH AURA WITHOUT STATUS MIGRAINOSUS: ICD-10-CM

## 2021-08-06 LAB — BACTERIA SPEC AEROBE CULT: ABNORMAL

## 2021-08-06 NOTE — TELEPHONE ENCOUNTER
Per pharm, rx was sent in 8/4/21, but pharm is needing to know how many grams per application, can you please resend a new rx with new dosing inst.

## 2021-08-06 NOTE — TELEPHONE ENCOUNTER
Caller: Daisy Cesar    Relationship: Self    Best call back number: [phone]    Medication needed:   Requested Prescriptions     Pending Prescriptions Disp Refills   • Diclofenac Sodium (VOLTAREN) 1 % gel gel 100 g 5     Sig: Apply  topically to the appropriate area as directed 2 (Two) Times a Day As Needed (neck pain).       When do you need the refill by: ASAP     What additional details did the patient provide when requesting the medication: PATIENT IS OUT OF MEDICATION     Does the patient have less than a 3 day supply:  [x] Yes  [] No    What is the patient's preferred pharmacy: [facility] [address], [place] [address]  - [phone]  - [phone] FX

## 2021-08-09 ENCOUNTER — TELEPHONE (OUTPATIENT)
Dept: FAMILY MEDICINE CLINIC | Age: 61
End: 2021-08-09

## 2021-08-09 DIAGNOSIS — N39.0 URINARY TRACT INFECTION WITHOUT HEMATURIA, SITE UNSPECIFIED: Primary | ICD-10-CM

## 2021-08-09 DIAGNOSIS — J45.30 MILD PERSISTENT ASTHMA WITHOUT COMPLICATION: ICD-10-CM

## 2021-08-09 NOTE — TELEPHONE ENCOUNTER
Caller: Daisy Cesar    Relationship: Self    Best call back number: [phone]     What is the best time to reach you: ANY    Who are you requesting to speak with (clinical staff, provider,  specific staff member): CLINICAL    What was the call regarding: PATIENT DIDN'T GET [facility] MEDICINE CALLED IN TO [facility]    PATIENT STILL HAS UTI AND SHE NEEDS MORE ANTIBIOTICS    Do you require a callback: YES, PLEASE CALL AND ADVISE

## 2021-08-09 NOTE — TELEPHONE ENCOUNTER
Pt needs Ventlon inhaler refilled.  Also states she is still having symptoms of UTI.  I informed her they we will need a new urine sample.  Please sign order and send in med.

## 2021-08-11 RX ORDER — ALBUTEROL SULFATE 90 UG/1
1 AEROSOL, METERED RESPIRATORY (INHALATION) EVERY 4 HOURS PRN
Qty: 18 G | Refills: 3 | Status: SHIPPED | OUTPATIENT
Start: 2021-08-11 | End: 2021-12-03

## 2021-09-05 DIAGNOSIS — N30.00 ACUTE CYSTITIS WITHOUT HEMATURIA: ICD-10-CM

## 2021-09-07 RX ORDER — SULFAMETHOXAZOLE AND TRIMETHOPRIM 800; 160 MG/1; MG/1
TABLET ORAL
Qty: 6 TABLET | Refills: 0 | OUTPATIENT
Start: 2021-09-07

## 2021-09-08 ENCOUNTER — TELEPHONE (OUTPATIENT)
Dept: FAMILY MEDICINE CLINIC | Age: 61
End: 2021-09-08

## 2021-09-08 RX ORDER — LEVOTHYROXINE SODIUM 0.05 MG/1
50 TABLET ORAL DAILY
Qty: 90 TABLET | Refills: 0 | Status: SHIPPED | OUTPATIENT
Start: 2021-09-08 | End: 2021-12-03

## 2021-09-08 NOTE — TELEPHONE ENCOUNTER
Caller: Daisy Cesar    Relationship: Self    Best call back number: [phone]     Medication needed:   Requested Prescriptions     Pending Prescriptions Disp Refills   • levothyroxine (Synthroid) 50 MCG tablet 90 tablet 1     Sig: Take 1 tablet by mouth Daily.       When do you need the refill by: 09/08/21     What additional details did the patient provide when requesting the medication: PATIENT IS CURRENTLY OUT OF THE MEDICATION. PATIENT IS REQUESTING FOR 90 DAY SUPPLY. PLEASE CALL AND ADVISE.     Does the patient have less than a 3 day supply:  [x] Yes  [] No    What is the patient's preferred pharmacy: [facility] [address] [place] [address], [place] [address]  - [phone]  - [phone] FX

## 2021-09-10 ENCOUNTER — TELEPHONE (OUTPATIENT)
Dept: FAMILY MEDICINE CLINIC | Age: 61
End: 2021-09-10

## 2021-09-10 DIAGNOSIS — E04.1 THYROID NODULE: ICD-10-CM

## 2021-09-10 DIAGNOSIS — Z12.31 ENCOUNTER FOR SCREENING MAMMOGRAM FOR MALIGNANT NEOPLASM OF BREAST: Primary | ICD-10-CM

## 2021-09-10 NOTE — TELEPHONE ENCOUNTER
Left detailed message on VM that she is due for mammo and repeat thyroid u/s.  Please sign orders.

## 2021-10-22 ENCOUNTER — HOSPITAL ENCOUNTER (OUTPATIENT)
Dept: MAMMOGRAPHY | Facility: HOSPITAL | Age: 61
Discharge: HOME | End: 2021-10-22

## 2021-10-22 ENCOUNTER — HOSPITAL ENCOUNTER (OUTPATIENT)
Dept: ULTRASOUND IMAGING | Facility: HOSPITAL | Age: 61
Discharge: HOME | End: 2021-10-22

## 2021-10-22 DIAGNOSIS — Z12.31 ENCOUNTER FOR SCREENING MAMMOGRAM FOR MALIGNANT NEOPLASM OF BREAST: ICD-10-CM

## 2021-10-22 DIAGNOSIS — E04.1 THYROID NODULE: ICD-10-CM

## 2021-10-22 PROCEDURE — 77067 SCR MAMMO BI INCL CAD: CPT

## 2021-10-22 PROCEDURE — 77063 BREAST TOMOSYNTHESIS BI: CPT

## 2021-10-22 PROCEDURE — 76536 US EXAM OF HEAD AND NECK: CPT

## 2021-10-29 ENCOUNTER — TELEPHONE (OUTPATIENT)
Dept: FAMILY MEDICINE CLINIC | Age: 61
End: 2021-10-29

## 2021-10-29 NOTE — TELEPHONE ENCOUNTER
Caller: Daisy Cesar    Relationship: Self    Best call back number: [phone]     What is the best time to reach you: ANY    Who are you requesting to speak with (clinical staff, provider,  specific staff member): JORDAN    What was the call regarding: PATIENT STATED THAT SHE WAS RETURNING A RECENT CALL TO JORDAN. SHE BELIEVES IT WAS ABOUT ULTRASOUND RESULTS.    Do you require a callback: YES

## 2021-11-01 ENCOUNTER — TELEPHONE (OUTPATIENT)
Dept: FAMILY MEDICINE CLINIC | Age: 61
End: 2021-11-01

## 2021-11-01 NOTE — TELEPHONE ENCOUNTER
HUB WAS UNABLE TO WARM TRANSFER     Caller: Daisy Cesar    Relationship: Self    Best call back number: [phone]    What is the best time to reach you: ANYTIME, MID - MORNING     Who are you requesting to speak with (clinical staff, provider,  specific staff member):    CLINICAL Cindy Brooks LPN     Do you know the name of the person who called: Cindy Brooks LPN     What was the call regarding: PATIENT CALLING REQUESTING TEST RESULTS     Do you require a callback: YES PLEASE ADVISE

## 2021-11-01 NOTE — TELEPHONE ENCOUNTER
Caller: Daisy Cesar    Relationship: Self    Best call back number:     What form or medical record are you requesting: THYROID ULTRASOUND RESULTS.     Who is requesting this form or medical record from you: PATIENT    How would you like to receive the form or medical records (pick-up, mail, fax):     If mail, what is the address:       [address] [place] [address] [address] [place] [address]    Timeframe paperwork needed: ASAP

## 2021-12-02 DIAGNOSIS — G43.119 INTRACTABLE MIGRAINE WITH AURA WITHOUT STATUS MIGRAINOSUS: ICD-10-CM

## 2021-12-02 DIAGNOSIS — J45.30 MILD PERSISTENT ASTHMA WITHOUT COMPLICATION: ICD-10-CM

## 2021-12-03 ENCOUNTER — OFFICE VISIT (OUTPATIENT)
Dept: FAMILY MEDICINE CLINIC | Age: 61
End: 2021-12-03

## 2021-12-03 VITALS
BODY MASS INDEX: 38.21 KG/M2 | HEART RATE: 98 BPM | SYSTOLIC BLOOD PRESSURE: 124 MMHG | DIASTOLIC BLOOD PRESSURE: 66 MMHG | HEIGHT: 64 IN | WEIGHT: 223.8 LBS

## 2021-12-03 DIAGNOSIS — E03.9 HYPOTHYROIDISM (ACQUIRED): ICD-10-CM

## 2021-12-03 DIAGNOSIS — L71.9 ROSACEA: ICD-10-CM

## 2021-12-03 DIAGNOSIS — G43.119 INTRACTABLE MIGRAINE WITH AURA WITHOUT STATUS MIGRAINOSUS: ICD-10-CM

## 2021-12-03 DIAGNOSIS — I10 ESSENTIAL HYPERTENSION: ICD-10-CM

## 2021-12-03 DIAGNOSIS — K22.2 ESOPHAGEAL STRICTURE: Primary | ICD-10-CM

## 2021-12-03 PROBLEM — R30.0 DYSURIA: Status: RESOLVED | Noted: 2021-08-04 | Resolved: 2021-12-03

## 2021-12-03 PROCEDURE — 99214 OFFICE O/P EST MOD 30 MIN: CPT | Performed by: FAMILY MEDICINE

## 2021-12-03 RX ORDER — ALBUTEROL SULFATE 90 UG/1
AEROSOL, METERED RESPIRATORY (INHALATION)
Qty: 18 G | Refills: 3 | Status: SHIPPED | OUTPATIENT
Start: 2021-12-03 | End: 2022-05-04

## 2021-12-03 RX ORDER — PROMETHAZINE HYDROCHLORIDE 25 MG/1
TABLET ORAL
Qty: 60 TABLET | Refills: 2 | Status: SHIPPED | OUTPATIENT
Start: 2021-12-03 | End: 2022-04-05

## 2021-12-03 RX ORDER — LEVOTHYROXINE SODIUM 0.05 MG/1
TABLET ORAL
Qty: 90 TABLET | Refills: 1 | Status: SHIPPED | OUTPATIENT
Start: 2021-12-03 | End: 2022-06-06

## 2021-12-03 NOTE — ASSESSMENT & PLAN NOTE
"Migraines are \"same old, same old.\"  She continues with her medication regimen and is following with Dr. Pierre who is doing occipital nerve blocks for her every 2 and half months or so.  She continues to see a correlation between the fatty tumors in the back of the neck and her headaches, but unfortunately no one has really been able to relate the presence of these tumors to her headaches from a medical standpoint or give any indication that removal or action up on the tumors would be beneficial to her headaches.  This has been frustrating for her, as she definitely sees a relationship herself.  She has now been seen by Plastic Surgery, Dermatology and ENT.  We will continue to monitor this going forward as best we can.  "

## 2021-12-03 NOTE — ASSESSMENT & PLAN NOTE
Feeling of dysphagia and globus sensation when she swallows.  She recently had an ultrasound done of the soft tissues of the neck that did show the thyroid nodules we are monitoring, but no other significant abnormalities.  She does have this history of esophageal stricture following her replacement in the neck that has given her trouble from time to time and she has had to have this dilated in the past.  I am going to send her to Dr. Rosas Whitt to see about doing EGD with possible dilation if indicated.

## 2021-12-03 NOTE — PROGRESS NOTES
"Chief Complaint  Thyroid Problem (Follow up), Hypertension (Follow up), and Hyperlipidemia (Follow up)    Subjective          Daisy Cesar presents to [facility] today for routine f/u of chronic issues.    She continues to struggle with refractory migraine headaches.  Following with Dr. Pierre.  She is on multiple medications including amitriptyline, meloxicam, tizanidine and Migranal.  She only uses the Migranal when the headaches are frontal, which are her worst headaches.  She has used Lyrica previously but stopped because it caused blurred vision.  Flector patches have worked well for her but insurance has declined these.    Topical diclofenac gel applied to the back of the neck does give her some relief.  Maxalt and Imitrex were too sedating.  Naratriptan increased her BP.  Zonisamide caused stomach pain and diarrhea.  Topiramate worsened her BPPV.  Dr. Pierre has been doing occipital nerve blocks for her, now Q2.5 months.  Those are starting to wear off a little more quickly.  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  However, Dr. [facility] evaluated her and told her that these are not in fact lipomas.  However, she is not sure what they are.    At her last visit, I placed referral to Dermatology to see if they might be able to give us a better idea of what these nodules are.  He thought the nodules might be fatty nodules.      She notes a feeling of \"a tight band\" around the throat that makes it difficult swallow (liquids or solids).  It feels like a globus sensation that travels down the chest.  She did have a thyroid U/S completed 9/2021 that showed a moderately suspicious thyroid nodules with continued yearly follow-up recommended.  She does have a h/o esophageal stricture due to hardware in the throat for which she has required dilation in the past.    She is having a lot of pain in the body " diffusely, with arthralgias and myalgias.    She has had some episodes of ataxia associated with the headaches and vertigo.  She carries a diagnose a BPPV by ENT in the past.       She is on levothyroxine for hypothyroidism.    No cold/heat intolerance, no changes in hair or skin.      She is on pravastatin for cholesterol.  No problems with myalgias.      She is on Prilosec for GERD.    No reflux or heartburn.  She is on dicyclomine for IBS and abdominal cramping.      Current Outpatient Medications:   •  acetaminophen (TYLENOL) 500 MG tablet, acetaminophen 500 mg oral tablet take 1 tablet (500 mg) by oral route every 3 hours as needed   Active, Disp: , Rfl:   •  amitriptyline (ELAVIL) 100 MG tablet, Take 1 tablet by mouth Every Night., Disp: 90 tablet, Rfl: 1  •  Calcium Carb-Cholecalciferol (Caltrate 600+D3 Soft) 600-800 MG-UNIT chewable tablet, Caltrate 600 + D 600 mg (1,500 mg)-800 unit oral tablet,chewable chew 1 tablet by oral route daily   Active, Disp: , Rfl:   •  Diclofenac Sodium (VOLTAREN) 1 % gel gel, Apply  topically to the appropriate area as directed 2 (Two) Times a Day As Needed (neck pain). 2 gm applied topically BID prn, Disp: 100 g, Rfl: 5  •  dicyclomine (BENTYL) 10 MG capsule, Take 1 capsule by mouth 2 (two) times a day., Disp: 180 capsule, Rfl: 1  •  dihydroergotamine (Migranal) 4 MG/ML nasal spray, Migranal 0.5 mg/pump act. (4 mg/mL) nasal spray,non-aerosol spray 1 spray (0.5 mg) by intranasal route in each nostril, repeat 15 minutes later for a total of 4 sprays (2 mg); do not exceed 6 sprays (3 mg) per day or 8 sprays (4 mg) per week   Active, Disp: , Rfl:   •  diphenhydrAMINE (Benadryl Allergy) 25 mg capsule, Benadryl 25 mg oral capsule take 2 capsules (50 mg) by oral route once daily at bedtime as needed   Active, Disp: , Rfl:   •  levothyroxine (Synthroid) 50 MCG tablet, Take 1 tablet by mouth Daily., Disp: 90 tablet, Rfl: 0  •  naproxen (Naprosyn) 500 MG tablet, Take 1 tablet by mouth 2  "(Two) Times a Day As Needed for Mild Pain . Take with food., Disp: 60 tablet, Rfl: 5  •  omeprazole (priLOSEC) 40 MG capsule, Take 1 capsule by mouth Daily., Disp: 90 capsule, Rfl: 1  •  phenazopyridine (PYRIDIUM) 95 MG tablet, Take 1 tablet by mouth 3 (Three) Times a Day As Needed for Bladder Spasms., Disp: 10 tablet, Rfl: 0  •  promethazine (PHENERGAN) 25 MG tablet, Take 1 tablet by mouth 2 (Two) Times a Day As Needed for Nausea or Vomiting., Disp: 60 tablet, Rfl: 2  •  tiZANidine (ZANAFLEX) 4 MG tablet, Take 1 tablet by mouth 4 (Four) Times a Day., Disp: 120 tablet, Rfl: 1  •  Ventolin  (90 Base) MCG/ACT inhaler, Inhale 1 puff Every 4 (Four) Hours As Needed for Wheezing., Disp: 18 g, Rfl: 3  •  metroNIDAZOLE (METROCREAM) 0.75 % cream, Apply 1 application topically to the appropriate area as directed 2 (Two) Times a Day., Disp: 45 g, Rfl: 1    Allergies:  Morphine and Penicillins      Objective   Vital Signs:   /66 (BP Location: Left arm, Patient Position: Sitting, Cuff Size: Adult)   Pulse 98   Ht 162.6 cm (64\")   Wt 102 kg (223 lb 12.8 oz)   BMI 38.42 kg/m²     Physical Exam  Vitals reviewed.   Constitutional:       General: She is not in acute distress.     Appearance: Normal appearance. She is well-developed.   HENT:      Head: Normocephalic and atraumatic.      Right Ear: External ear normal.      Left Ear: External ear normal.      Nose: Nose normal.      Mouth/Throat:      Mouth: Mucous membranes are moist.   Eyes:      Extraocular Movements: Extraocular movements intact.      Conjunctiva/sclera: Conjunctivae normal.      Pupils: Pupils are equal, round, and reactive to light.   Cardiovascular:      Rate and Rhythm: Normal rate and regular rhythm.      Heart sounds: No murmur heard.      Pulmonary:      Effort: Pulmonary effort is normal.      Breath sounds: Normal breath sounds. No wheezing, rhonchi or rales.   Abdominal:      General: Bowel sounds are normal. There is no distension.      " "Palpations: Abdomen is soft.      Tenderness: There is no abdominal tenderness.   Musculoskeletal:         General: Normal range of motion.   Neurological:      Mental Status: She is alert.   Psychiatric:         Mood and Affect: Affect normal.             Assessment and Plan    Diagnoses and all orders for this visit:    1. Esophageal stricture (Primary)  Assessment & Plan:  Feeling of dysphagia and globus sensation when she swallows.  She recently had an ultrasound done of the soft tissues of the neck that did show the thyroid nodules we are monitoring, but no other significant abnormalities.  She does have this history of esophageal stricture following her replacement in the neck that has given her trouble from time to time and she has had to have this dilated in the past.  I am going to send her to Dr. Rosas Whitt to see about doing EGD with possible dilation if indicated.    Orders:  -     Ambulatory Referral to General Surgery    2. Intractable migraine with aura without status migrainosus  Assessment & Plan:  Migraines are \"same old, same old.\"  She continues with her medication regimen and is following with Dr. Pierre who is doing occipital nerve blocks for her every 2 and half months or so.  She continues to see a correlation between the fatty tumors in the back of the neck and her headaches, but unfortunately no one has really been able to relate the presence of these tumors to her headaches from a medical standpoint or give any indication that removal or action up on the tumors would be beneficial to her headaches.  This has been frustrating for her, as she definitely sees a relationship herself.  She has now been seen by Plastic Surgery, Dermatology and ENT.  We will continue to monitor this going forward as best we can.      3. Rosacea  Assessment & Plan:  Refill needed today on her metronidazole cream.    Orders:  -     metroNIDAZOLE (METROCREAM) 0.75 % cream; Apply 1 application topically to the " appropriate area as directed 2 (Two) Times a Day.  Dispense: 45 g; Refill: 1    4. Hypothyroidism (acquired)  Assessment & Plan:  Stable.  Labs reviewed and up-to-date.  No refills needed.      5. Essential hypertension  Assessment & Plan:  Stable.  Labs reviewed and up-to-date.  No refills needed.        Follow Up   Return in about 4 months (around 4/3/2022) for Medicare Wellness.  Patient was given instructions and counseling regarding her condition or for health maintenance advice. Please see specific information pulled into the AVS if appropriate.

## 2022-01-04 DIAGNOSIS — K58.0 IRRITABLE BOWEL SYNDROME WITH DIARRHEA: ICD-10-CM

## 2022-01-04 DIAGNOSIS — K21.9 GASTROESOPHAGEAL REFLUX DISEASE WITHOUT ESOPHAGITIS: ICD-10-CM

## 2022-01-04 RX ORDER — OMEPRAZOLE 40 MG/1
CAPSULE, DELAYED RELEASE ORAL
Qty: 90 CAPSULE | Refills: 1 | Status: SHIPPED | OUTPATIENT
Start: 2022-01-04 | End: 2022-04-22 | Stop reason: SDUPTHER

## 2022-01-04 RX ORDER — DICYCLOMINE HYDROCHLORIDE 10 MG/1
CAPSULE ORAL
Qty: 180 CAPSULE | Refills: 1 | Status: SHIPPED | OUTPATIENT
Start: 2022-01-04 | End: 2022-04-22 | Stop reason: SDUPTHER

## 2022-01-18 ENCOUNTER — TELEPHONE (OUTPATIENT)
Dept: FAMILY MEDICINE CLINIC | Age: 62
End: 2022-01-18

## 2022-01-20 DIAGNOSIS — G43.119 INTRACTABLE MIGRAINE WITH AURA WITHOUT STATUS MIGRAINOSUS: ICD-10-CM

## 2022-02-05 DIAGNOSIS — G89.29 CHRONIC BILATERAL LOW BACK PAIN WITH SCIATICA, SCIATICA LATERALITY UNSPECIFIED: ICD-10-CM

## 2022-02-05 DIAGNOSIS — M54.40 CHRONIC BILATERAL LOW BACK PAIN WITH SCIATICA, SCIATICA LATERALITY UNSPECIFIED: ICD-10-CM

## 2022-02-08 RX ORDER — NAPROXEN 500 MG/1
TABLET ORAL
Qty: 60 TABLET | Refills: 5 | Status: SHIPPED | OUTPATIENT
Start: 2022-02-08 | End: 2022-07-08 | Stop reason: SDUPTHER

## 2022-03-30 DIAGNOSIS — G43.119 INTRACTABLE MIGRAINE WITH AURA WITHOUT STATUS MIGRAINOSUS: ICD-10-CM

## 2022-03-30 RX ORDER — AMITRIPTYLINE HYDROCHLORIDE 100 MG/1
TABLET, FILM COATED ORAL
Qty: 90 TABLET | Refills: 1 | Status: SHIPPED | OUTPATIENT
Start: 2022-03-30 | End: 2022-09-14 | Stop reason: SDUPTHER

## 2022-04-04 DIAGNOSIS — G43.119 INTRACTABLE MIGRAINE WITH AURA WITHOUT STATUS MIGRAINOSUS: ICD-10-CM

## 2022-04-05 RX ORDER — PROMETHAZINE HYDROCHLORIDE 25 MG/1
TABLET ORAL
Qty: 60 TABLET | Refills: 2 | Status: SHIPPED | OUTPATIENT
Start: 2022-04-05 | End: 2022-07-08 | Stop reason: SDUPTHER

## 2022-04-16 DIAGNOSIS — G43.119 INTRACTABLE MIGRAINE WITH AURA WITHOUT STATUS MIGRAINOSUS: ICD-10-CM

## 2022-04-19 RX ORDER — TIZANIDINE 4 MG/1
TABLET ORAL
Qty: 120 TABLET | Refills: 1 | Status: SHIPPED | OUTPATIENT
Start: 2022-04-19 | End: 2022-07-08 | Stop reason: SDUPTHER

## 2022-04-22 ENCOUNTER — OFFICE VISIT (OUTPATIENT)
Dept: FAMILY MEDICINE CLINIC | Age: 62
End: 2022-04-22

## 2022-04-22 VITALS
DIASTOLIC BLOOD PRESSURE: 50 MMHG | HEART RATE: 96 BPM | SYSTOLIC BLOOD PRESSURE: 116 MMHG | TEMPERATURE: 98.5 F | BODY MASS INDEX: 37.05 KG/M2 | WEIGHT: 217 LBS | HEIGHT: 64 IN

## 2022-04-22 DIAGNOSIS — E78.2 MIXED HYPERLIPIDEMIA: ICD-10-CM

## 2022-04-22 DIAGNOSIS — G43.119 INTRACTABLE MIGRAINE WITH AURA WITHOUT STATUS MIGRAINOSUS: ICD-10-CM

## 2022-04-22 DIAGNOSIS — E55.9 VITAMIN D DEFICIENCY: ICD-10-CM

## 2022-04-22 DIAGNOSIS — E03.9 HYPOTHYROIDISM (ACQUIRED): ICD-10-CM

## 2022-04-22 DIAGNOSIS — I10 ESSENTIAL HYPERTENSION: ICD-10-CM

## 2022-04-22 DIAGNOSIS — Z12.11 COLON CANCER SCREENING: ICD-10-CM

## 2022-04-22 DIAGNOSIS — Z00.00 ANNUAL PHYSICAL EXAM: Primary | ICD-10-CM

## 2022-04-22 DIAGNOSIS — K58.0 IRRITABLE BOWEL SYNDROME WITH DIARRHEA: ICD-10-CM

## 2022-04-22 DIAGNOSIS — K21.9 GASTROESOPHAGEAL REFLUX DISEASE WITHOUT ESOPHAGITIS: ICD-10-CM

## 2022-04-22 PROCEDURE — 1126F AMNT PAIN NOTED NONE PRSNT: CPT | Performed by: FAMILY MEDICINE

## 2022-04-22 PROCEDURE — 1170F FXNL STATUS ASSESSED: CPT | Performed by: FAMILY MEDICINE

## 2022-04-22 PROCEDURE — G0439 PPPS, SUBSEQ VISIT: HCPCS | Performed by: FAMILY MEDICINE

## 2022-04-22 PROCEDURE — 1160F RVW MEDS BY RX/DR IN RCRD: CPT | Performed by: FAMILY MEDICINE

## 2022-04-22 PROCEDURE — 1125F AMNT PAIN NOTED PAIN PRSNT: CPT | Performed by: FAMILY MEDICINE

## 2022-04-22 PROCEDURE — 1159F MED LIST DOCD IN RCRD: CPT | Performed by: FAMILY MEDICINE

## 2022-04-22 PROCEDURE — 99214 OFFICE O/P EST MOD 30 MIN: CPT | Performed by: FAMILY MEDICINE

## 2022-04-22 RX ORDER — DICYCLOMINE HYDROCHLORIDE 10 MG/1
10 CAPSULE ORAL 2 TIMES DAILY
Qty: 180 CAPSULE | Refills: 1 | Status: SHIPPED | OUTPATIENT
Start: 2022-04-22 | End: 2022-07-08 | Stop reason: SDUPTHER

## 2022-04-22 RX ORDER — OMEPRAZOLE 40 MG/1
40 CAPSULE, DELAYED RELEASE ORAL DAILY
Qty: 90 CAPSULE | Refills: 1 | Status: SHIPPED | OUTPATIENT
Start: 2022-04-22 | End: 2022-07-08 | Stop reason: SDUPTHER

## 2022-04-22 RX ORDER — MULTIPLE VITAMINS W/ MINERALS TAB 9MG-400MCG
1 TAB ORAL DAILY
COMMUNITY
End: 2023-01-17

## 2022-04-22 NOTE — ASSESSMENT & PLAN NOTE
This is an ongoing issue for her.  She is following with Neurology.  Currently using Zanaflex, amitriptyline, Mobic and Migranal as well as topical diclofenac gel.  She continues to associate the fatty nodules on the back of her neck with increased migraine frequency and severity.  We have had these assessed by both Plastic Surgery and Dermatology here locally with no clear answer on what they are or whether they may be impacting her migraine headaches.  At 1 point, they were thought to be lipomatosis, but Plastics did not feel this was the case.  I have discussed with her today that we could consider sending her to [place] or [place] for the opinion of a subspecialist since she really feels the impact her quality of life quite significantly.  She is hesitant to do that due to the driving distance, but will think about it and let me know if she wants to proceed.

## 2022-04-22 NOTE — ASSESSMENT & PLAN NOTE
She is due for colonoscopy, last done 2010 and this was normal.  Not interested in colonoscopy but she is willing to do the FIT Hemoccult.  Ordered today. Pap smear is no longer indicated by history; s/p hysterectomy.  She is UTD on mammogram, last done 10/2021 and this was normal.  She is UTD on DEXA, last done 9/2020 and this showed osteopenia.  She is up-to-date on Td (8/2021).  She is due for COVID, Shingrix and Pneumovax; declines all today.  She will think about Pneumovax.  Flu shot is not currently indicated. She is due for routine labs including HTN panel and TSH; ordered.

## 2022-04-22 NOTE — PROGRESS NOTES
The ABCs of the Annual Wellness Visit  Subsequent Medicare Wellness Visit    Chief Complaint   Patient presents with   • Medicare Wellness-subsequent      Subjective    History of Present Illness:  Daisy Cesar is a 62 y.o. female who presents for a Subsequent Medicare Wellness Visit.    She is due for colonoscopy, last done 2010 and this was normal.  Pap smear is no longer indicated by history; s/p hysterectomy.  She is UTD on mammogram, last done 10/2021 and this was normal.  She is UTD on DEXA, last done 9/2020 and this showed osteopenia.  She is up-to-date on Td (8/2021).  She is due for COVID, Shingrix and Pneumovax.  Flu shot is not currently indicated. She is due for routine labs including HTN panel and TSH.    She continues to struggle with refractory migraine headaches.  She follows with Dr. Pierre.  She is on multiple medications including tizanidine, amitriptyline,  Mobic, and Migranal.  She uses the Migranal only with her worst headaches (the frontal ones).  She has used Lyrica previously (caused blurred vision), Maxalt and Imitrex (too sedating), naratriptan (elevated BP), zonisamide (abdominal pain and diarrhea), Topamax (BPPV).  Flector patches have worked well for her but insurance has declined these.  Topical diclofenac gel applied to the back of the neck does give her some relief.  Dr. Pierre has been doing occipital nerve blocks for her.  The duration of relief from the blocks seem to be lasting only 2 months.  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  Dr. [facility] evaluated her and told her that these are not in fact lipomas.  She has seen Dermatology as well, who could not give her a diagnosis.     She does have a h/o esophageal stricture due to hardware in the throat for which she has required dilation in the past.     She has had some episodes of ataxia associated with the headaches and vertigo.  She carries a  diagnose a BPPV by ENT in the past.       She is on levothyroxine for hypothyroidism.   She denies heat/cold intolerance, changes in hair or skin.      No longer on a statin for hyperlipidemia.  Pravastatin was causing myalgias.      She is on  omeprazole for GERD.  She denies heartburn or indigestion.  She is on dicyclomine for IBS and abdominal cramping.       The following portions of the patient's history were reviewed and   updated as appropriate: allergies, current medications, past family history, past medical history, past social history, past surgical history and problem list.    Compared to one year ago, the patient feels her physical   health is the same.    Compared to one year ago, the patient feels her mental   health is the same.    Recent Hospitalizations:  She was not admitted to the hospital during the last year.       Current Medical Providers:  Patient Care Team:  Elise Cueto MD as PCP - General (Family Medicine)    Outpatient Medications Prior to Visit   Medication Sig Dispense Refill   • acetaminophen (TYLENOL) 500 MG tablet acetaminophen 500 mg oral tablet take 1 tablet (500 mg) by oral route every 3 hours as needed   Active     • amitriptyline (ELAVIL) 100 MG tablet TAKE ONE TABLET BY MOUTH ONCE NIGHTLY 90 tablet 1   • Calcium Carb-Cholecalciferol (Caltrate 600+D3 Soft) 600-800 MG-UNIT chewable tablet Caltrate 600 + D 600 mg (1,500 mg)-800 unit oral tablet,chewable chew 1 tablet by oral route daily   Active     • Diclofenac Sodium (VOLTAREN) 1 % gel gel APPLY 2 GRAMS TO AFFECTED AREA AS DIRECTED TWO TIMES A DAY FOR NECK PAIN 100 g 5   • dihydroergotamine (MIGRANAL) 4 MG/ML nasal spray Migranal 0.5 mg/pump act. (4 mg/mL) nasal spray,non-aerosol spray 1 spray (0.5 mg) by intranasal route in each nostril, repeat 15 minutes later for a total of 4 sprays (2 mg); do not exceed 6 sprays (3 mg) per day or 8 sprays (4 mg) per week   Active     • diphenhydrAMINE (BENADRYL) 25 mg capsule  Benadryl 25 mg oral capsule take 2 capsules (50 mg) by oral route once daily at bedtime as needed   Active     • levothyroxine (SYNTHROID, LEVOTHROID) 50 MCG tablet TAKE ONE TABLET BY MOUTH DAILY 90 tablet 1   • metroNIDAZOLE (METROCREAM) 0.75 % cream Apply 1 application topically to the appropriate area as directed 2 (Two) Times a Day. 45 g 1   • multivitamin with minerals (HAIR SKIN AND NAILS FORMULA PO) Take 1 tablet by mouth Daily.     • naproxen (NAPROSYN) 500 MG tablet TAKE ONE TABLET BY MOUTH TWICE A DAY AS NEEDED FOR MILD PAIN *TAKE WITH FOOD* 60 tablet 5   • phenazopyridine (PYRIDIUM) 95 MG tablet Take 1 tablet by mouth 3 (Three) Times a Day As Needed for Bladder Spasms. 10 tablet 0   • promethazine (PHENERGAN) 25 MG tablet TAKE ONE TABLET BY MOUTH TWICE A DAY AS NEEDED FOR NAUSEA AND VOMITING 60 tablet 2   • tiZANidine (ZANAFLEX) 4 MG tablet TAKE ONE TABLET BY MOUTH FOUR TIMES A  tablet 1   • Ventolin  (90 Base) MCG/ACT inhaler INHALE ONE PUFF BY MOUTH EVERY 4 HOURS AS NEEDED FOR WHEEZING 18 g 3   • dicyclomine (BENTYL) 10 MG capsule TAKE ONE CAPSULE BY MOUTH TWICE A  capsule 1   • omeprazole (priLOSEC) 40 MG capsule TAKE ONE CAPSULE BY MOUTH DAILY 90 capsule 1     No facility-administered medications prior to visit.       No opioid medication identified on active medication list. I have reviewed chart for other potential  high risk medication/s and harmful drug interactions in the elderly.          Aspirin is not on active medication list.  Aspirin use is not indicated based on review of current medical condition/s. Risk of harm outweighs potential benefits.  .    Patient Active Problem List   Diagnosis   • Allergic rhinitis   • Mild persistent asthma without complication   • Mixed hyperlipidemia   • Essential hypertension   • Lipomatosis   • Intractable migraine with aura without status migrainosus   • Neck pain   • Irritable bowel syndrome with diarrhea   • Gastroesophageal reflux  "disease without esophagitis   • Rosacea   • Hypothyroidism (acquired)   • Osteopenia of multiple sites   • Esophageal stricture   • Generalized osteoarthritis   • High risk medication use   • Annual physical exam     Advance Care Planning  Advance Directive is not on file.  ACP discussion was held with the patient during this visit. Patient does not have an advance directive, information provided.    Review of Systems   Constitutional: Positive for fatigue. Negative for chills and fever.   HENT: Negative for congestion, hearing loss and rhinorrhea.    Eyes: Negative for pain and visual disturbance.   Respiratory: Negative for cough and shortness of breath.    Cardiovascular: Negative for chest pain and palpitations.   Gastrointestinal: Negative for abdominal pain, constipation, diarrhea, nausea and vomiting.        +reflex   Genitourinary: Negative for difficulty urinating and dysuria.   Musculoskeletal: Positive for arthralgias, back pain, myalgias and neck pain.   Neurological: Negative for weakness and numbness.   Psychiatric/Behavioral: Negative for dysphoric mood and sleep disturbance. The patient is not nervous/anxious.         Objective    Vitals:    04/22/22 0825   BP: 116/50   BP Location: Left arm   Patient Position: Sitting   Pulse: 96   Temp: 98.5 °F (36.9 °C)   TempSrc: Oral   Weight: 98.4 kg (217 lb)   Height: 162.6 cm (64\")     BMI Readings from Last 1 Encounters:   04/22/22 37.25 kg/m²   BMI is above normal parameters. Recommendations include: exercise counseling and nutrition counseling    Does the patient have evidence of cognitive impairment? No    Physical Exam  Vitals reviewed.   Constitutional:       General: She is not in acute distress.     Appearance: Normal appearance. She is well-developed.   HENT:      Head: Normocephalic and atraumatic.      Right Ear: External ear normal.      Left Ear: External ear normal.      Mouth/Throat:      Mouth: Mucous membranes are moist.   Eyes:      " Extraocular Movements: Extraocular movements intact.      Conjunctiva/sclera: Conjunctivae normal.      Pupils: Pupils are equal, round, and reactive to light.   Cardiovascular:      Rate and Rhythm: Normal rate and regular rhythm.      Heart sounds: No murmur heard.  Pulmonary:      Effort: Pulmonary effort is normal.      Breath sounds: Normal breath sounds. No wheezing, rhonchi or rales.   Abdominal:      General: Bowel sounds are normal. There is no distension.      Palpations: Abdomen is soft.      Tenderness: There is no abdominal tenderness.   Musculoskeletal:         General: Normal range of motion.   Skin:     General: Skin is warm and dry.   Neurological:      Mental Status: She is alert and oriented to person, place, and time.      Deep Tendon Reflexes: Reflexes normal.   Psychiatric:         Mood and Affect: Mood and affect normal.         Behavior: Behavior normal.         Thought Content: Thought content normal.         Judgment: Judgment normal.                 HEALTH RISK ASSESSMENT    Smoking Status:  Social History     Tobacco Use   Smoking Status Never Smoker   Smokeless Tobacco Never Used     Alcohol Consumption:  Social History     Substance and Sexual Activity   Alcohol Use None     Fall Risk Screen:    [facility] Fall Risk Assessment has not been completed.    Depression Screening:  PHQ-2/PHQ-9 Depression Screening 4/22/2022   Retired PHQ-9 Total Score -   Retired Total Score -   Little Interest or Pleasure in Doing Things 0-->not at all   Feeling Down, Depressed or Hopeless 0-->not at all   PHQ-9: Brief Depression Severity Measure Score 0       Health Habits and Functional and Cognitive Screening:  Functional & Cognitive Status 4/22/2022   Do you have difficulty preparing food and eating? No   Do you have difficulty bathing yourself, getting dressed or grooming yourself? No   Do you have difficulty using the toilet? No   Do you have difficulty moving around from place to place? No   Do you have  trouble with steps or getting out of a bed or a chair? No   Current Diet Well Balanced Diet   Dental Exam Not up to date   Eye Exam Up to date   Exercise (times per week) 3 times per week   Current Exercises Include Walking   Do you need help using the phone?  No   Are you deaf or do you have serious difficulty hearing?  No   Do you need help with transportation? No   Do you need help shopping? No   Do you need help preparing meals?  No   Do you need help with housework?  No   Do you need help with laundry? No   Do you need help taking your medications? No   Do you need help managing money? No   Do you ever drive or ride in a car without wearing a seat belt? No   Have you felt unusual stress, anger or loneliness in the last month? No   Who do you live with? Alone   If you need help, do you have trouble finding someone available to you? No   Have you been bothered in the last four weeks by sexual problems? No   Do you have difficulty concentrating, remembering or making decisions? No       Age-appropriate Screening Schedule:  Refer to the list below for future screening recommendations based on patient's age, sex and/or medical conditions. Orders for these recommended tests are listed in the plan section. The patient has been provided with a written plan.    Health Maintenance   Topic Date Due   • LIPID PANEL  03/31/2022   • ZOSTER VACCINE (1 of 2) 04/22/2022 (Originally 1/9/2009)   • INFLUENZA VACCINE  08/01/2022   • DXA SCAN  09/24/2022   • MAMMOGRAM  10/22/2023   • TDAP/TD VACCINES (2 - Tdap) 08/04/2031              Assessment/Plan   CMS Preventative Services Quick Reference  Risk Factors Identified During Encounter  Immunizations Discussed/Encouraged (specific Immunizations; Pneumococcal 23 and COVID19  The above risks/problems have been discussed with the patient.  Follow up actions/plans if indicated are seen below in the Assessment/Plan Section.  Pertinent information has been shared with the patient in the  After Visit Summary.    Diagnoses and all orders for this visit:    1. Annual physical exam (Primary)  Assessment & Plan:  She is due for colonoscopy, last done 2010 and this was normal.  Not interested in colonoscopy but she is willing to do the FIT Hemoccult.  Ordered today. Pap smear is no longer indicated by history; s/p hysterectomy.  She is UTD on mammogram, last done 10/2021 and this was normal.  She is UTD on DEXA, last done 9/2020 and this showed osteopenia.  She is up-to-date on Td (8/2021).  She is due for COVID, Shingrix and Pneumovax; declines all today.  She will think about Pneumovax.  Flu shot is not currently indicated. She is due for routine labs including HTN panel and TSH; ordered.      2. Essential hypertension  Assessment & Plan:  Not currently on medication.  Continue to monitor.    Orders:  -     Lipid Panel; Future  -     Comprehensive Metabolic Panel; Future    3. Mixed hyperlipidemia  Assessment & Plan:  Not currently on medication.  Continue to monitor.      4. Intractable migraine with aura without status migrainosus  Assessment & Plan:  This is an ongoing issue for her.  She is following with Neurology.  Currently using Zanaflex, amitriptyline, Mobic and Migranal as well as topical diclofenac gel.  She continues to associate the fatty nodules on the back of her neck with increased migraine frequency and severity.  We have had these assessed by both Plastic Surgery and Dermatology here locally with no clear answer on what they are or whether they may be impacting her migraine headaches.  At 1 point, they were thought to be lipomatosis, but Plastics did not feel this was the case.  I have discussed with her today that we could consider sending her to [place] or [place] for the opinion of a subspecialist since she really feels the impact her quality of life quite significantly.  She is hesitant to do that due to the driving distance, but will think about it and let me know if she  wants to proceed.      5. Hypothyroidism (acquired)  Assessment & Plan:  Stable on levothyroxine 50 mcg daily.  No refills needed.  Checking labs.    Orders:  -     TSH; Future    6. Colon cancer screening  -     Occult Blood, Fecal By Immunoassay - Stool, Per Rectum; Future    7. Vitamin D deficiency  -     Vitamin D 25 Hydroxy; Future    8. Gastroesophageal reflux disease without esophagitis  Assessment & Plan:  Stable on omeprazole 40 mg daily.  Refill sent.  She is following with Dr. Rosas Whitt at [facility] who wants to do a scope and barium swallow, but she is waiting a bit longer on that.  We did discuss today that it would be helpful to go ahead and get these procedures done.    Orders:  -     omeprazole (priLOSEC) 40 MG capsule; Take 1 capsule by mouth Daily.  Dispense: 90 capsule; Refill: 1    9. Irritable bowel syndrome with diarrhea  Assessment & Plan:  Stable on dicyclomine 10 mg twice daily.  Refills sent.  No recent flares.    Orders:  -     dicyclomine (BENTYL) 10 MG capsule; Take 1 capsule by mouth 2 (Two) Times a Day.  Dispense: 180 capsule; Refill: 1      Follow Up:   Return in about 3 months (around 7/22/2022) for Recheck.     An After Visit Summary and PPPS were made available to the patient.

## 2022-04-26 ENCOUNTER — LAB (OUTPATIENT)
Dept: LAB | Facility: HOSPITAL | Age: 62
End: 2022-04-26

## 2022-04-26 DIAGNOSIS — N39.0 URINARY TRACT INFECTION WITHOUT HEMATURIA, SITE UNSPECIFIED: ICD-10-CM

## 2022-04-26 DIAGNOSIS — Z12.11 COLON CANCER SCREENING: ICD-10-CM

## 2022-04-26 DIAGNOSIS — E55.9 VITAMIN D DEFICIENCY: ICD-10-CM

## 2022-04-26 DIAGNOSIS — I10 ESSENTIAL HYPERTENSION: ICD-10-CM

## 2022-04-26 DIAGNOSIS — E03.9 HYPOTHYROIDISM (ACQUIRED): ICD-10-CM

## 2022-04-26 LAB
25(OH)D3 SERPL-MCNC: 49.4 NG/ML (ref 30–100)
ALBUMIN SERPL-MCNC: 4.5 G/DL (ref 3.5–5.2)
ALBUMIN/GLOB SERPL: 1.5 G/DL
ALP SERPL-CCNC: 94 U/L (ref 39–117)
ALT SERPL W P-5'-P-CCNC: 13 U/L (ref 1–33)
ANION GAP SERPL CALCULATED.3IONS-SCNC: 12.8 MMOL/L (ref 5–15)
AST SERPL-CCNC: 15 U/L (ref 1–32)
BACTERIA UR QL AUTO: ABNORMAL /HPF
BILIRUB SERPL-MCNC: <0.2 MG/DL (ref 0–1.2)
BILIRUB UR QL STRIP: NEGATIVE
BUN SERPL-MCNC: 19 MG/DL (ref 8–23)
BUN/CREAT SERPL: 21.6 (ref 7–25)
CALCIUM SPEC-SCNC: 10 MG/DL (ref 8.6–10.5)
CHLORIDE SERPL-SCNC: 102 MMOL/L (ref 98–107)
CHOLEST SERPL-MCNC: 278 MG/DL (ref 0–200)
CLARITY UR: CLEAR
CO2 SERPL-SCNC: 25.2 MMOL/L (ref 22–29)
COLOR UR: YELLOW
CREAT SERPL-MCNC: 0.88 MG/DL (ref 0.57–1)
EGFRCR SERPLBLD CKD-EPI 2021: 73.9 ML/MIN/1.73
GLOBULIN UR ELPH-MCNC: 3 GM/DL
GLUCOSE SERPL-MCNC: 113 MG/DL (ref 65–99)
GLUCOSE UR STRIP-MCNC: NEGATIVE MG/DL
HDLC SERPL-MCNC: 58 MG/DL (ref 40–60)
HGB UR QL STRIP.AUTO: ABNORMAL
KETONES UR QL STRIP: NEGATIVE
LDLC SERPL CALC-MCNC: 179 MG/DL (ref 0–100)
LDLC/HDLC SERPL: 3.03 {RATIO}
LEUKOCYTE ESTERASE UR QL STRIP.AUTO: ABNORMAL
NITRITE UR QL STRIP: NEGATIVE
PH UR STRIP.AUTO: 6 [PH] (ref 5–8)
POTASSIUM SERPL-SCNC: 4.5 MMOL/L (ref 3.5–5.2)
PROT SERPL-MCNC: 7.5 G/DL (ref 6–8.5)
PROT UR QL STRIP: NEGATIVE
RBC # UR STRIP: ABNORMAL /HPF
REF LAB TEST METHOD: ABNORMAL
SODIUM SERPL-SCNC: 140 MMOL/L (ref 136–145)
SP GR UR STRIP: 1.02 (ref 1–1.03)
SQUAMOUS #/AREA URNS HPF: ABNORMAL /HPF
TRANS CELLS #/AREA URNS HPF: ABNORMAL /HPF
TRIGL SERPL-MCNC: 221 MG/DL (ref 0–150)
TSH SERPL DL<=0.05 MIU/L-ACNC: 1.61 UIU/ML (ref 0.27–4.2)
UROBILINOGEN UR QL STRIP: ABNORMAL
VLDLC SERPL-MCNC: 41 MG/DL (ref 5–40)
WBC # UR STRIP: ABNORMAL /HPF

## 2022-04-26 PROCEDURE — 80053 COMPREHEN METABOLIC PANEL: CPT

## 2022-04-26 PROCEDURE — 84443 ASSAY THYROID STIM HORMONE: CPT

## 2022-04-26 PROCEDURE — 80061 LIPID PANEL: CPT

## 2022-04-26 PROCEDURE — 81001 URINALYSIS AUTO W/SCOPE: CPT

## 2022-04-26 PROCEDURE — 36415 COLL VENOUS BLD VENIPUNCTURE: CPT

## 2022-04-26 PROCEDURE — 82306 VITAMIN D 25 HYDROXY: CPT

## 2022-05-04 DIAGNOSIS — J45.30 MILD PERSISTENT ASTHMA WITHOUT COMPLICATION: ICD-10-CM

## 2022-05-04 RX ORDER — ALBUTEROL SULFATE 90 UG/1
AEROSOL, METERED RESPIRATORY (INHALATION)
Qty: 18 G | Refills: 3 | Status: SHIPPED | OUTPATIENT
Start: 2022-05-04 | End: 2022-07-08 | Stop reason: SDUPTHER

## 2022-06-06 RX ORDER — LEVOTHYROXINE SODIUM 0.05 MG/1
TABLET ORAL
Qty: 90 TABLET | Refills: 1 | Status: SHIPPED | OUTPATIENT
Start: 2022-06-06 | End: 2022-07-08 | Stop reason: SDUPTHER

## 2022-07-08 ENCOUNTER — TELEPHONE (OUTPATIENT)
Dept: FAMILY MEDICINE CLINIC | Age: 62
End: 2022-07-08

## 2022-07-08 DIAGNOSIS — G43.119 INTRACTABLE MIGRAINE WITH AURA WITHOUT STATUS MIGRAINOSUS: ICD-10-CM

## 2022-07-08 DIAGNOSIS — J45.30 MILD PERSISTENT ASTHMA WITHOUT COMPLICATION: ICD-10-CM

## 2022-07-08 DIAGNOSIS — L71.9 ROSACEA: ICD-10-CM

## 2022-07-08 DIAGNOSIS — K58.0 IRRITABLE BOWEL SYNDROME WITH DIARRHEA: ICD-10-CM

## 2022-07-08 DIAGNOSIS — K21.9 GASTROESOPHAGEAL REFLUX DISEASE WITHOUT ESOPHAGITIS: ICD-10-CM

## 2022-07-08 DIAGNOSIS — M54.40 CHRONIC BILATERAL LOW BACK PAIN WITH SCIATICA, SCIATICA LATERALITY UNSPECIFIED: ICD-10-CM

## 2022-07-08 DIAGNOSIS — G89.29 CHRONIC BILATERAL LOW BACK PAIN WITH SCIATICA, SCIATICA LATERALITY UNSPECIFIED: ICD-10-CM

## 2022-07-08 DIAGNOSIS — E03.9 HYPOTHYROIDISM (ACQUIRED): Primary | ICD-10-CM

## 2022-07-08 RX ORDER — NAPROXEN 500 MG/1
500 TABLET ORAL 2 TIMES DAILY WITH MEALS
Qty: 180 TABLET | Refills: 1 | Status: SHIPPED | OUTPATIENT
Start: 2022-07-08 | End: 2022-08-15

## 2022-07-08 RX ORDER — DICYCLOMINE HYDROCHLORIDE 10 MG/1
10 CAPSULE ORAL 2 TIMES DAILY
Qty: 180 CAPSULE | Refills: 1 | Status: SHIPPED | OUTPATIENT
Start: 2022-07-08 | End: 2022-09-13

## 2022-07-08 RX ORDER — LEVOTHYROXINE SODIUM 0.05 MG/1
50 TABLET ORAL DAILY
Qty: 90 TABLET | Refills: 1 | Status: SHIPPED | OUTPATIENT
Start: 2022-07-08 | End: 2022-12-27

## 2022-07-08 RX ORDER — OMEPRAZOLE 40 MG/1
40 CAPSULE, DELAYED RELEASE ORAL DAILY
Qty: 90 CAPSULE | Refills: 1 | Status: SHIPPED | OUTPATIENT
Start: 2022-07-08 | End: 2022-09-13

## 2022-07-08 RX ORDER — PROMETHAZINE HYDROCHLORIDE 25 MG/1
25 TABLET ORAL 2 TIMES DAILY PRN
Qty: 60 TABLET | Refills: 2 | Status: SHIPPED | OUTPATIENT
Start: 2022-07-08 | End: 2022-09-13

## 2022-07-08 RX ORDER — TIZANIDINE 4 MG/1
4 TABLET ORAL 4 TIMES DAILY
Qty: 360 TABLET | Refills: 1 | Status: SHIPPED | OUTPATIENT
Start: 2022-07-08 | End: 2022-09-13

## 2022-07-08 RX ORDER — ALBUTEROL SULFATE 90 UG/1
1 AEROSOL, METERED RESPIRATORY (INHALATION) EVERY 4 HOURS PRN
Qty: 54 G | Refills: 1 | Status: SHIPPED | OUTPATIENT
Start: 2022-07-08 | End: 2022-09-13

## 2022-07-08 NOTE — TELEPHONE ENCOUNTER
Caller: Daisy Cesar    Relationship: Self    Best call back number: [phone]    What is the best time to reach you: ANY    Who are you requesting to speak with (clinical staff, provider,  specific staff member): CLINICAL STAFF    What was the call regarding: PATIENT CALLED WANTING TO SPEAK TO A  NURSE ABOUT A SAMPLE AND HER MEDICATION.    Do you require a callback: YES

## 2022-07-24 DIAGNOSIS — G43.119 INTRACTABLE MIGRAINE WITH AURA WITHOUT STATUS MIGRAINOSUS: ICD-10-CM

## 2022-08-14 DIAGNOSIS — M54.40 CHRONIC BILATERAL LOW BACK PAIN WITH SCIATICA, SCIATICA LATERALITY UNSPECIFIED: ICD-10-CM

## 2022-08-14 DIAGNOSIS — G89.29 CHRONIC BILATERAL LOW BACK PAIN WITH SCIATICA, SCIATICA LATERALITY UNSPECIFIED: ICD-10-CM

## 2022-08-16 RX ORDER — NAPROXEN 500 MG/1
TABLET ORAL
Qty: 60 TABLET | Refills: 2 | Status: SHIPPED | OUTPATIENT
Start: 2022-08-16 | End: 2022-11-30

## 2022-08-23 ENCOUNTER — TELEPHONE (OUTPATIENT)
Dept: FAMILY MEDICINE CLINIC | Age: 62
End: 2022-08-23

## 2022-08-23 NOTE — TELEPHONE ENCOUNTER
Caller: Daisy Cesar    Relationship: Self    Best call back number: [phone]    What is the best time to reach you: ANY    Who are you requesting to speak with (clinical staff, provider,  specific staff member): CLINICAL STAFF    What was the call regarding: PATIENT CALLED STATING THAT SHE HAS NO INTERNET AND IS UNABLE TO REPLY TO TEXT MESSAGES. SHE WANTED EPI TO KNOW THAT SHE IS UNABLE TO SIGN UP FOR [employer] BUT IS ABLE TO RECEIVE TEXTS.    Do you require a callback: YES

## 2022-09-12 DIAGNOSIS — G43.119 INTRACTABLE MIGRAINE WITH AURA WITHOUT STATUS MIGRAINOSUS: ICD-10-CM

## 2022-09-12 DIAGNOSIS — K58.0 IRRITABLE BOWEL SYNDROME WITH DIARRHEA: ICD-10-CM

## 2022-09-12 DIAGNOSIS — K21.9 GASTROESOPHAGEAL REFLUX DISEASE WITHOUT ESOPHAGITIS: ICD-10-CM

## 2022-09-12 DIAGNOSIS — J45.30 MILD PERSISTENT ASTHMA WITHOUT COMPLICATION: ICD-10-CM

## 2022-09-13 RX ORDER — PROMETHAZINE HYDROCHLORIDE 25 MG/1
TABLET ORAL
Qty: 180 TABLET | Refills: 0 | Status: SHIPPED | OUTPATIENT
Start: 2022-09-13 | End: 2022-09-14 | Stop reason: SDUPTHER

## 2022-09-13 RX ORDER — OMEPRAZOLE 40 MG/1
40 CAPSULE, DELAYED RELEASE ORAL DAILY
Qty: 90 CAPSULE | Refills: 1 | Status: SHIPPED | OUTPATIENT
Start: 2022-09-13 | End: 2023-03-20

## 2022-09-13 RX ORDER — ALBUTEROL SULFATE 90 UG/1
AEROSOL, METERED RESPIRATORY (INHALATION)
Qty: 54 G | Refills: 3 | Status: SHIPPED | OUTPATIENT
Start: 2022-09-13

## 2022-09-13 RX ORDER — TIZANIDINE 4 MG/1
TABLET ORAL
Qty: 360 TABLET | Refills: 1 | Status: SHIPPED | OUTPATIENT
Start: 2022-09-13

## 2022-09-13 RX ORDER — DICYCLOMINE HYDROCHLORIDE 10 MG/1
CAPSULE ORAL
Qty: 180 CAPSULE | Refills: 1 | Status: SHIPPED | OUTPATIENT
Start: 2022-09-13

## 2022-09-14 ENCOUNTER — OFFICE VISIT (OUTPATIENT)
Dept: FAMILY MEDICINE CLINIC | Age: 62
End: 2022-09-14

## 2022-09-14 VITALS
HEIGHT: 64 IN | SYSTOLIC BLOOD PRESSURE: 131 MMHG | BODY MASS INDEX: 36.4 KG/M2 | DIASTOLIC BLOOD PRESSURE: 82 MMHG | HEART RATE: 93 BPM | WEIGHT: 213.2 LBS

## 2022-09-14 DIAGNOSIS — Z78.0 MENOPAUSE: ICD-10-CM

## 2022-09-14 DIAGNOSIS — Z12.31 SCREENING MAMMOGRAM, ENCOUNTER FOR: ICD-10-CM

## 2022-09-14 DIAGNOSIS — K21.9 GASTROESOPHAGEAL REFLUX DISEASE WITHOUT ESOPHAGITIS: ICD-10-CM

## 2022-09-14 DIAGNOSIS — E88.2 LIPOMATOSIS: ICD-10-CM

## 2022-09-14 DIAGNOSIS — E03.9 HYPOTHYROIDISM (ACQUIRED): ICD-10-CM

## 2022-09-14 DIAGNOSIS — E04.1 THYROID NODULE: ICD-10-CM

## 2022-09-14 DIAGNOSIS — G43.119 INTRACTABLE MIGRAINE WITH AURA WITHOUT STATUS MIGRAINOSUS: Primary | ICD-10-CM

## 2022-09-14 PROBLEM — Z00.00 ANNUAL PHYSICAL EXAM: Status: RESOLVED | Noted: 2022-04-22 | Resolved: 2022-09-14

## 2022-09-14 PROCEDURE — 99214 OFFICE O/P EST MOD 30 MIN: CPT | Performed by: FAMILY MEDICINE

## 2022-09-14 RX ORDER — PROMETHAZINE HYDROCHLORIDE 25 MG/1
25 TABLET ORAL 2 TIMES DAILY PRN
Qty: 180 TABLET | Refills: 0 | Status: SHIPPED | OUTPATIENT
Start: 2022-09-14 | End: 2023-01-03

## 2022-09-14 RX ORDER — AMITRIPTYLINE HYDROCHLORIDE 100 MG/1
100 TABLET, FILM COATED ORAL NIGHTLY
Qty: 90 TABLET | Refills: 1 | Status: SHIPPED | OUTPATIENT
Start: 2022-09-14 | End: 2023-01-03

## 2022-09-14 NOTE — ASSESSMENT & PLAN NOTE
She continues to have problems with her headaches which are fairly debilitating.  Continue on tizanidine, amitriptyline, naproxen and Migranal as needed.  She has been following with [facility] Dr. Pierre up till recently but since Dr. Harrison left, she is going to be establishing with their new specialist next month.  She is very concerned that she may not be able to get her occipital nerve blocks done down here in [place] with the new provider.  She is not able to afford transportation up to [place].  We did discuss today that should transportation proved to be a barrier, we may be able to get her in with Dr. Springer here in our building, particularly if he is able to do some of his headache procedures that she has been having done.

## 2022-09-14 NOTE — PROGRESS NOTES
Chief Complaint  Hypertension (4 month follow up )    Subjective          Daisy Cesar presents to [facility] today for follow-up of chronic issues.    She continues to struggle with refractory migraine headaches.  Following with [facility] (she is getting a new specialist next month since Dr. Pierre has now left).  She is on multiple medications including tizanidine, amitriptyline, naproxen, and Migranal.  She uses the Migranal only with her worst headaches (the frontal ones).  She has used Lyrica previously (caused blurred vision), Maxalt and Imitrex (too sedating), naratriptan (elevated BP), zonisamide (abdominal pain and diarrhea), Topamax (BPPV).  Flector patches have worked well for her but insurance has declined these.  Topical diclofenac gel applied to the back of the neck does give her some relief.  She has been getting occipital nerve blocks which give her relief for about 2 months.  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  Dr. [facility] evaluated her and told her that these are not in fact lipomas.  She has seen Dermatology as well, who could not give her a diagnosis beyond that the lesions seemed to be lipomas.  She feels like the lesions are getting bigger and spreading (now involving the lower back which is new).  One of the nodules on the anterior neck also seems to be getting larger and feels as though it is pressing on the esophagus.       She does have a h/o esophageal stricture due to hardware in the throat for which she has required dilation in the past.     She has had some episodes of ataxia associated with the headaches and vertigo.  She carries a diagnose of BPPV by ENT in the past.       She is on levothyroxine for hypothyroidism.   No cold/heat intolerance or changes in hair or skin.      No longer on a statin for hyperlipidemia.    Statins caused myalgias.      She is  on omeprazole for GERD.  No problems with indigestion or reflux.  She is on dicyclomine for IBS and abdominal cramping.      Current Outpatient Medications:   •  acetaminophen (TYLENOL) 500 MG tablet, acetaminophen 500 mg oral tablet take 1 tablet (500 mg) by oral route every 3 hours as needed   Active, Disp: , Rfl:   •  amitriptyline (ELAVIL) 100 MG tablet, Take 1 tablet by mouth Every Night., Disp: 90 tablet, Rfl: 1  •  Calcium Carb-Cholecalciferol (Caltrate 600+D3 Soft) 600-800 MG-UNIT chewable tablet, Caltrate 600 + D 600 mg (1,500 mg)-800 unit oral tablet,chewable chew 1 tablet by oral route daily   Active, Disp: , Rfl:   •  Diclofenac Sodium (VOLTAREN) 1 % gel gel, APPLY 2 GRAMS TO AFFECTED AREA AS DIRECTED TWO TIMES A DAY FOR NECK PAIN, Disp: 100 g, Rfl: 2  •  dicyclomine (BENTYL) 10 MG capsule, TAKE 1 CAPSULE BY MOUTH  TWICE DAILY, Disp: 180 capsule, Rfl: 1  •  dihydroergotamine (MIGRANAL) 4 MG/ML nasal spray, Migranal 0.5 mg/pump act. (4 mg/mL) nasal spray,non-aerosol spray 1 spray (0.5 mg) by intranasal route in each nostril, repeat 15 minutes later for a total of 4 sprays (2 mg); do not exceed 6 sprays (3 mg) per day or 8 sprays (4 mg) per week   Active, Disp: , Rfl:   •  diphenhydrAMINE (BENADRYL) 25 mg capsule, Benadryl 25 mg oral capsule take 2 capsules (50 mg) by oral route once daily at bedtime as needed   Active, Disp: , Rfl:   •  levothyroxine (SYNTHROID, LEVOTHROID) 50 MCG tablet, Take 1 tablet by mouth Daily., Disp: 90 tablet, Rfl: 1  •  metroNIDAZOLE (METROCREAM) 0.75 % cream, Apply 1 application topically to the appropriate area as directed 2 (Two) Times a Day., Disp: 45 g, Rfl: 1  •  multivitamin with minerals tablet tablet, Take 1 tablet by mouth Daily., Disp: , Rfl:   •  naproxen (NAPROSYN) 500 MG tablet, TAKE ONE TABLET BY MOUTH TWICE A DAY WITH FOOD AS NEEDED FOR MILD, Disp: 60 tablet, Rfl: 2  •  omeprazole (priLOSEC) 40 MG capsule, TAKE 1 CAPSULE BY MOUTH  DAILY, Disp: 90 capsule, Rfl:  "1  •  promethazine (PHENERGAN) 25 MG tablet, Take 1 tablet by mouth 2 (Two) Times a Day As Needed for Nausea or Vomiting., Disp: 180 tablet, Rfl: 0  •  tiZANidine (ZANAFLEX) 4 MG tablet, TAKE 1 TABLET BY MOUTH 4  TIMES DAILY, Disp: 360 tablet, Rfl: 1  •  Ventolin  (90 Base) MCG/ACT inhaler, USE 1 INHALATION BY MOUTH  EVERY 4 HOURS AS NEEDED FOR WHEEZING, Disp: 54 g, Rfl: 3    Allergies:  Morphine and Penicillins      Objective   Vital Signs:   Vitals:    09/14/22 1246   BP: 131/82   BP Location: Left arm   Patient Position: Sitting   Pulse: 93   Weight: 96.7 kg (213 lb 3.2 oz)   Height: 162.6 cm (64.02\")       Physical Exam  Vitals reviewed.   Constitutional:       General: She is not in acute distress.     Appearance: Normal appearance. She is well-developed.   HENT:      Head: Normocephalic and atraumatic.      Right Ear: External ear normal.      Left Ear: External ear normal.      Nose: Nose normal.      Mouth/Throat:      Mouth: Mucous membranes are moist.   Eyes:      Extraocular Movements: Extraocular movements intact.      Conjunctiva/sclera: Conjunctivae normal.      Pupils: Pupils are equal, round, and reactive to light.   Cardiovascular:      Rate and Rhythm: Normal rate and regular rhythm.      Heart sounds: No murmur heard.  Pulmonary:      Effort: Pulmonary effort is normal.      Breath sounds: Normal breath sounds. No wheezing, rhonchi or rales.   Abdominal:      General: Bowel sounds are normal. There is no distension.      Palpations: Abdomen is soft.      Tenderness: There is no abdominal tenderness.   Musculoskeletal:         General: Normal range of motion.   Skin:     General: Skin is warm and dry.      Comments: Firm rubbery nodules found diffusely over the trunk and neck   Neurological:      Mental Status: She is alert.   Psychiatric:         Mood and Affect: Affect normal.          Lab Results   Component Value Date    GLUCOSE 113 (H) 04/26/2022    BUN 19 04/26/2022    CREATININE 0.88 " 04/26/2022    BCR 21.6 04/26/2022    K 4.5 04/26/2022    CO2 25.2 04/26/2022    CALCIUM 10.0 04/26/2022    ALBUMIN 4.50 04/26/2022    LABIL2 1.4 03/31/2021    AST 15 04/26/2022    ALT 13 04/26/2022       Lab Results   Component Value Date    CHOL 278 (H) 04/26/2022    CHLPL 245 (H) 03/31/2021    TRIG 221 (H) 04/26/2022    HDL 58 04/26/2022     (H) 04/26/2022            Assessment and Plan    Diagnoses and all orders for this visit:    1. Intractable migraine with aura without status migrainosus (Primary)  Assessment & Plan:  She continues to have problems with her headaches which are fairly debilitating.  Continue on tizanidine, amitriptyline, naproxen and Migranal as needed.  She has been following with [facility] Dr. Pierre up till recently but since Dr. Harrison left, she is going to be establishing with their new specialist next month.  She is very concerned that she may not be able to get her occipital nerve blocks done down here in [place] with the new provider.  She is not able to afford transportation up to [place].  We did discuss today that should transportation proved to be a barrier, we may be able to get her in with Dr. Springer here in our building, particularly if he is able to do some of his headache procedures that she has been having done.    Orders:  -     promethazine (PHENERGAN) 25 MG tablet; Take 1 tablet by mouth 2 (Two) Times a Day As Needed for Nausea or Vomiting.  Dispense: 180 tablet; Refill: 0  -     amitriptyline (ELAVIL) 100 MG tablet; Take 1 tablet by mouth Every Night.  Dispense: 90 tablet; Refill: 1    2. Hypothyroidism (acquired)  Assessment & Plan:  Stable on levothyroxine 50 mcg daily.  No refills needed.  Labs are reviewed and up-to-date.      3. Gastroesophageal reflux disease without esophagitis  Assessment & Plan:  Stable on omeprazole 40 mg daily.  No refills needed.  Wean as able.      4. Thyroid nodule  Assessment & Plan:  Due for routine yearly  thyroid ultrasound.  This has been ordered.    Orders:  -     US Thyroid; Future    5. Lipomatosis  Assessment & Plan:  Diffuse firm rubbery nodules covering her trunk and neck.  She has seen a couple of different specialist in the past.  Dr. Tommie Peterson originally diagnosed her with lipomatosis.  When she saw Dr. Belem Quispe a couple of years ago, Dr. Vaughan stated she did not think that these were lipomas after all.  She was then seen by Dermatology who told her he felt the lesions were lipomas but did not do any confirmatory testing on that.  She is interested in getting an answer 1 where the other.  She is interested possibly in undergoing biopsy of 1 of these lesions.  The lesions do typically feel tender and she feels a lot of mass-effects associated with them, particularly the larger ones.  There is 1 in particular on the front of her neck that she feels is pushing on her esophagus.  I am going to wait until we get the results of the neck ultrasound back as above and then plan to put in a referral for Dr. Crawford in Plastics for a second opinion.      6. Screening mammogram, encounter for  -     Mammo Screening Digital Tomosynthesis Bilateral With CAD; Future    7. Menopause  -     DEXA Bone Density Axial; Future      Follow Up   Return in about 4 months (around 1/14/2023) for Recheck.  Patient was given instructions and counseling regarding her condition or for health maintenance advice. Please see specific information pulled into the AVS if appropriate.

## 2022-09-14 NOTE — ASSESSMENT & PLAN NOTE
Diffuse firm rubbery nodules covering her trunk and neck.  She has seen a couple of different specialist in the past.  Dr. Tommie Peterson originally diagnosed her with lipomatosis.  When she saw Dr. Belem Quispe a couple of years ago, Dr. Vaughan stated she did not think that these were lipomas after all.  She was then seen by Dermatology who told her he felt the lesions were lipomas but did not do any confirmatory testing on that.  She is interested in getting an answer 1 where the other.  She is interested possibly in undergoing biopsy of 1 of these lesions.  The lesions do typically feel tender and she feels a lot of mass-effects associated with them, particularly the larger ones.  There is 1 in particular on the front of her neck that she feels is pushing on her esophagus.  I am going to wait until we get the results of the neck ultrasound back as above and then plan to put in a referral for Dr. Crawford in Plastics for a second opinion.

## 2022-09-27 DIAGNOSIS — G43.119 INTRACTABLE MIGRAINE WITH AURA WITHOUT STATUS MIGRAINOSUS: ICD-10-CM

## 2022-09-27 RX ORDER — AMITRIPTYLINE HYDROCHLORIDE 100 MG/1
TABLET, FILM COATED ORAL
Qty: 90 TABLET | Refills: 1 | OUTPATIENT
Start: 2022-09-27

## 2022-09-29 DIAGNOSIS — G43.119 INTRACTABLE MIGRAINE WITH AURA WITHOUT STATUS MIGRAINOSUS: ICD-10-CM

## 2022-10-31 ENCOUNTER — HOSPITAL ENCOUNTER (OUTPATIENT)
Dept: ULTRASOUND IMAGING | Facility: HOSPITAL | Age: 62
Discharge: HOME | End: 2022-10-31

## 2022-10-31 ENCOUNTER — HOSPITAL ENCOUNTER (OUTPATIENT)
Dept: BONE DENSITY | Facility: HOSPITAL | Age: 62
Discharge: HOME | End: 2022-10-31

## 2022-10-31 ENCOUNTER — HOSPITAL ENCOUNTER (OUTPATIENT)
Dept: MAMMOGRAPHY | Facility: HOSPITAL | Age: 62
Discharge: HOME | End: 2022-10-31

## 2022-10-31 DIAGNOSIS — E04.1 THYROID NODULE: ICD-10-CM

## 2022-10-31 DIAGNOSIS — Z12.31 SCREENING MAMMOGRAM, ENCOUNTER FOR: ICD-10-CM

## 2022-10-31 DIAGNOSIS — Z78.0 MENOPAUSE: ICD-10-CM

## 2022-10-31 PROCEDURE — 77063 BREAST TOMOSYNTHESIS BI: CPT

## 2022-10-31 PROCEDURE — 77067 SCR MAMMO BI INCL CAD: CPT

## 2022-10-31 PROCEDURE — 76536 US EXAM OF HEAD AND NECK: CPT

## 2022-10-31 PROCEDURE — 77080 DXA BONE DENSITY AXIAL: CPT

## 2022-11-30 DIAGNOSIS — G89.29 CHRONIC BILATERAL LOW BACK PAIN WITH SCIATICA, SCIATICA LATERALITY UNSPECIFIED: ICD-10-CM

## 2022-11-30 DIAGNOSIS — M54.40 CHRONIC BILATERAL LOW BACK PAIN WITH SCIATICA, SCIATICA LATERALITY UNSPECIFIED: ICD-10-CM

## 2022-11-30 RX ORDER — NAPROXEN 500 MG/1
TABLET ORAL
Qty: 180 TABLET | Refills: 1 | Status: SHIPPED | OUTPATIENT
Start: 2022-11-30

## 2022-12-25 DIAGNOSIS — E03.9 HYPOTHYROIDISM (ACQUIRED): ICD-10-CM

## 2022-12-27 RX ORDER — LEVOTHYROXINE SODIUM 0.05 MG/1
50 TABLET ORAL DAILY
Qty: 90 TABLET | Refills: 1 | Status: SHIPPED | OUTPATIENT
Start: 2022-12-27

## 2023-01-02 DIAGNOSIS — G43.119 INTRACTABLE MIGRAINE WITH AURA WITHOUT STATUS MIGRAINOSUS: ICD-10-CM

## 2023-01-03 RX ORDER — AMITRIPTYLINE HYDROCHLORIDE 100 MG/1
100 TABLET, FILM COATED ORAL
Qty: 90 TABLET | Refills: 1 | Status: SHIPPED | OUTPATIENT
Start: 2023-01-03

## 2023-01-03 RX ORDER — PROMETHAZINE HYDROCHLORIDE 25 MG/1
TABLET ORAL
Qty: 180 TABLET | Refills: 0 | Status: SHIPPED | OUTPATIENT
Start: 2023-01-03

## 2023-01-17 ENCOUNTER — OFFICE VISIT (OUTPATIENT)
Dept: FAMILY MEDICINE CLINIC | Age: 63
End: 2023-01-17
Payer: MEDICARE

## 2023-01-17 VITALS
BODY MASS INDEX: 36.19 KG/M2 | SYSTOLIC BLOOD PRESSURE: 141 MMHG | HEIGHT: 64 IN | WEIGHT: 212 LBS | DIASTOLIC BLOOD PRESSURE: 83 MMHG | HEART RATE: 89 BPM

## 2023-01-17 DIAGNOSIS — M15.9 GENERALIZED OSTEOARTHRITIS: ICD-10-CM

## 2023-01-17 DIAGNOSIS — I10 ESSENTIAL HYPERTENSION: ICD-10-CM

## 2023-01-17 DIAGNOSIS — L20.84 INTRINSIC ECZEMA: ICD-10-CM

## 2023-01-17 DIAGNOSIS — E88.2 LIPOMATOSIS: ICD-10-CM

## 2023-01-17 DIAGNOSIS — E03.9 HYPOTHYROIDISM (ACQUIRED): ICD-10-CM

## 2023-01-17 DIAGNOSIS — E78.2 MIXED HYPERLIPIDEMIA: ICD-10-CM

## 2023-01-17 DIAGNOSIS — G43.119 INTRACTABLE MIGRAINE WITH AURA WITHOUT STATUS MIGRAINOSUS: Primary | ICD-10-CM

## 2023-01-17 PROBLEM — Z79.899 HIGH RISK MEDICATION USE: Status: RESOLVED | Noted: 2021-08-04 | Resolved: 2023-01-17

## 2023-01-17 PROCEDURE — 99214 OFFICE O/P EST MOD 30 MIN: CPT | Performed by: FAMILY MEDICINE

## 2023-01-17 RX ORDER — DOXYCYCLINE 50 MG/1
CAPSULE ORAL
COMMUNITY
Start: 2023-01-05

## 2023-01-17 RX ORDER — GALCANEZUMAB 120 MG/ML
INJECTION, SOLUTION SUBCUTANEOUS
COMMUNITY
Start: 2022-12-27

## 2023-01-17 NOTE — ASSESSMENT & PLAN NOTE
Blood pressure at goal at home.  Not currently on medication.  Continue to monitor closely.  Checking labs.

## 2023-01-17 NOTE — ASSESSMENT & PLAN NOTE
Eczema over the anterior chest.  Eczema.  Treatment today recommended with hydrocortisone OTC applied twice daily to the rash.  She may apply eczema cream such as Aquaphor/Eucerin/Gold Bond over the top of that.  When taking a shower or bath, use warm, not hot, water.  Gently pat dry with towel, do not rub. Apply eczema cream to the rash frequently throughout the day.  Call or return to clinic as needed worsening or failure to improve symptoms.

## 2023-01-17 NOTE — ASSESSMENT & PLAN NOTE
Following with Neurology.  She has discharged Emgality a couple weeks ago.  Hopefully will see some benefit from that.  She does feel that the rubbery nodular lesions are worsening.  She has gotten conflicting opinions on their origin from various specialists in the past.  We may consider a referral back to Plastics (Dr. Crystal or Dr. Brothers) in the future should Cristal feel that would be helpful.

## 2023-01-17 NOTE — PROGRESS NOTES
Chief Complaint  Migraine (4 month follow-up )    Subjective          Daisy Cesar presents to [facility] today for routine follow-up on chronic issues.    Refractory migraines continue to be an ongoing problem.  Her migraines occur daily.  Following with [facility].  Has now started on Emgality.  She is on multiple medications including amitriptyline, tizanidine, and naproxen,  with Migranal for as needed use.  She uses the Migranal only with her worst frontal headaches.  Previously, she has used Lyrica (blurry vision), Maxalt and Imitrex (too sedating), naratriptan (elevated BP), zonisamide (abd pain and diarrhea), Topamax (BPPV).  Flector patches have worked well for her but insurance has declined these.  Diclofenac gel to the back of the neck does give her some relief.  She was getting occipital nerve blocks which gave her relief for a couple of months.  However, she stopped these because she was no longer getting relief and it actually felt as though the pressure in the back and left side of the head became worse afterwards.  She feels as though the lesions in the neck, previously diagnosed as lipomatosis, worsen her headaches.  Dr[facility] evaluated her and did not believe they were lipomas.  She has had some conflicting opinions on this point, as she saw Dermatology as well, who could not give her a definitive diagnosis but felt that the lesions did seem to be lipomas.  She feels like the lesions are getting bigger and spreading (now involving the lower back which is new).  This has been frustrating for her.     She does have a h/o esophageal stricture due to hardware in the throat for which she has required dilation in the past.     She has had some episodes of ataxia associated with the headaches and vertigo.  She carries a diagnose of BPPV by ENT in the past.       She is on levothyroxine for hypothyroidism.  Denies heat/cold intolerance,  changes in skin or hair.      Unable to tolerate a statin for hyperlipidemia due to myalgias.      She is on omeprazole for GERD.  Denies reflux or heartburn.  She is on dicyclomine for IBS and abdominal cramping.      Current Outpatient Medications:   •  acetaminophen (TYLENOL) 500 MG tablet, acetaminophen 500 mg oral tablet take 1 tablet (500 mg) by oral route every 3 hours as needed   Active, Disp: , Rfl:   •  amitriptyline (ELAVIL) 100 MG tablet, Take 1 tablet by mouth every night at bedtime., Disp: 90 tablet, Rfl: 1  •  BIOTIN 5000 PO, Take  by mouth., Disp: , Rfl:   •  Calcium Carb-Cholecalciferol (Caltrate 600+D3 Soft) 600-800 MG-UNIT chewable tablet, Caltrate 600 + D 600 mg (1,500 mg)-800 unit oral tablet,chewable chew 1 tablet by oral route daily   Active, Disp: , Rfl:   •  Diclofenac Sodium (VOLTAREN) 1 % gel gel, APPLY 2 GM TOPICALLY TO THE APPROPRIATE AREA AS  DIRECTED TWICE DAILY, Disp: 200 g, Rfl: 3  •  dicyclomine (BENTYL) 10 MG capsule, TAKE 1 CAPSULE BY MOUTH  TWICE DAILY, Disp: 180 capsule, Rfl: 1  •  dihydroergotamine (MIGRANAL) 4 MG/ML nasal spray, Migranal 0.5 mg/pump act. (4 mg/mL) nasal spray,non-aerosol spray 1 spray (0.5 mg) by intranasal route in each nostril, repeat 15 minutes later for a total of 4 sprays (2 mg); do not exceed 6 sprays (3 mg) per day or 8 sprays (4 mg) per week   Active, Disp: , Rfl:   •  diphenhydrAMINE (BENADRYL) 25 mg capsule, Benadryl 25 mg oral capsule take 2 capsules (50 mg) by oral route once daily at bedtime as needed   Active, Disp: , Rfl:   •  doxycycline (MONODOX) 50 MG capsule, TAKE 1 CAPSULE BY MOUTH EVERY DAY FOR 2 WEEKS., Disp: , Rfl:   •  galcanezumab-gnlm (Emgality) 120 MG/ML auto-injector pen, INJECT 2 MLS (240MG)  SUBCUTANEOUSLY INTO THE SKIN 1  TIME FOR 1 DOSE, Disp: , Rfl:   •  hydrocortisone 2.5 % cream, , Disp: , Rfl:   •  levothyroxine (SYNTHROID, LEVOTHROID) 50 MCG tablet, Take 1 tablet by mouth Daily., Disp: 90 tablet, Rfl: 1  •  metroNIDAZOLE  "(METROCREAM) 0.75 % cream, Apply 1 application topically to the appropriate area as directed 2 (Two) Times a Day., Disp: 45 g, Rfl: 1  •  naproxen (NAPROSYN) 500 MG tablet, TAKE 1 TABLET BY MOUTH  TWICE DAILY WITH MEALS, Disp: 180 tablet, Rfl: 1  •  omeprazole (priLOSEC) 40 MG capsule, TAKE 1 CAPSULE BY MOUTH  DAILY, Disp: 90 capsule, Rfl: 1  •  promethazine (PHENERGAN) 25 MG tablet, TAKE 1 TABLET BY MOUTH  TWICE DAILY AS NEEDED FOR  NAUSEA OR VOMITING, Disp: 180 tablet, Rfl: 0  •  tiZANidine (ZANAFLEX) 4 MG tablet, TAKE 1 TABLET BY MOUTH 4  TIMES DAILY, Disp: 360 tablet, Rfl: 1  •  Ventolin  (90 Base) MCG/ACT inhaler, USE 1 INHALATION BY MOUTH  EVERY 4 HOURS AS NEEDED FOR WHEEZING, Disp: 54 g, Rfl: 3    Allergies:  Morphine and Penicillins      Objective   Vital Signs:   Vitals:    01/17/23 1016 01/17/23 1040   BP: 142/69 141/83   BP Location: Left arm Left arm   Patient Position: Sitting Sitting   Cuff Size: Adult Adult   Pulse: 93 89   Weight: 96.2 kg (212 lb)    Height: 162.6 cm (64.02\")        Physical Exam  Vitals reviewed.   Constitutional:       General: She is not in acute distress.     Appearance: Normal appearance. She is well-developed.   HENT:      Head: Normocephalic and atraumatic.      Right Ear: External ear normal.      Left Ear: External ear normal.      Nose: Nose normal.      Mouth/Throat:      Mouth: Mucous membranes are moist.   Eyes:      Extraocular Movements: Extraocular movements intact.      Conjunctiva/sclera: Conjunctivae normal.      Pupils: Pupils are equal, round, and reactive to light.   Cardiovascular:      Rate and Rhythm: Normal rate and regular rhythm.      Heart sounds: No murmur heard.  Pulmonary:      Effort: Pulmonary effort is normal.      Breath sounds: Normal breath sounds. No wheezing, rhonchi or rales.   Abdominal:      General: Bowel sounds are normal. There is no distension.      Palpations: Abdomen is soft.      Tenderness: There is no abdominal tenderness. "   Musculoskeletal:         General: Normal range of motion.   Skin:     General: Skin is warm and dry.      Findings: Rash (scaly plaques over the anterior mid-chest) present.      Comments: Firm rubbery nodules found diffusely over the trunk and neck   Neurological:      Mental Status: She is alert.   Psychiatric:         Mood and Affect: Affect normal.          Lab Results   Component Value Date    GLUCOSE 113 (H) 04/26/2022    BUN 19 04/26/2022    CREATININE 0.88 04/26/2022    BCR 21.6 04/26/2022    K 4.5 04/26/2022    CO2 25.2 04/26/2022    CALCIUM 10.0 04/26/2022    ALBUMIN 4.50 04/26/2022    LABIL2 1.4 03/31/2021    AST 15 04/26/2022    ALT 13 04/26/2022       Lab Results   Component Value Date    CHOL 278 (H) 04/26/2022    CHLPL 245 (H) 03/31/2021    TRIG 221 (H) 04/26/2022    HDL 58 04/26/2022     (H) 04/26/2022            Assessment and Plan    Diagnoses and all orders for this visit:    1. Intractable migraine with aura without status migrainosus (Primary)  Assessment & Plan:  Following with Neurology.  She has discharged Emgality a couple weeks ago.  Hopefully will see some benefit from that.  She does feel that the rubbery nodular lesions are worsening.  She has gotten conflicting opinions on their origin from various specialists in the past.  We may consider a referral back to Plastics (Dr. Crystal or Dr. Brothers) in the future should Cristal feel that would be helpful.      2. Lipomatosis  Assessment & Plan:  As per migraine plan.      3. Essential hypertension  Assessment & Plan:  Blood pressure at goal at home.  Not currently on medication.  Continue to monitor closely.  Checking labs.    Orders:  -     Lipid Panel; Future  -     Comprehensive Metabolic Panel; Future  -     CBC & Differential; Future    4. Mixed hyperlipidemia  Overview:  Unable to tolerate statins due to myalgias.      5. Hypothyroidism (acquired)  Assessment & Plan:  Stable on levothyroxine 50 mcg daily.  No refills needed.   Checking labs.    Orders:  -     TSH; Future    6. Intrinsic eczema  Assessment & Plan:  Eczema over the anterior chest.  Eczema.  Treatment today recommended with hydrocortisone OTC applied twice daily to the rash.  She may apply eczema cream such as Aquaphor/Eucerin/Gold Bond over the top of that.  When taking a shower or bath, use warm, not hot, water.  Gently pat dry with towel, do not rub. Apply eczema cream to the rash frequently throughout the day.  Call or return to clinic as needed worsening or failure to improve symptoms.        7. Generalized osteoarthritis  Assessment & Plan:  Continue naproxen.  Continue to monitor.        Follow Up   Return in about 4 months (around 5/17/2023) for Medicare Wellness.  Patient was given instructions and counseling regarding her condition or for health maintenance advice. Please see specific information pulled into the AVS if appropriate.

## 2023-01-24 ENCOUNTER — LAB (OUTPATIENT)
Dept: LAB | Facility: HOSPITAL | Age: 63
End: 2023-01-24
Payer: MEDICARE

## 2023-01-24 DIAGNOSIS — E03.9 HYPOTHYROIDISM (ACQUIRED): ICD-10-CM

## 2023-01-24 DIAGNOSIS — I10 ESSENTIAL HYPERTENSION: ICD-10-CM

## 2023-01-24 LAB
ALBUMIN SERPL-MCNC: 4.1 G/DL (ref 3.5–5.2)
ALBUMIN/GLOB SERPL: 1.4 G/DL
ALP SERPL-CCNC: 89 U/L (ref 39–117)
ALT SERPL W P-5'-P-CCNC: 7 U/L (ref 1–33)
ANION GAP SERPL CALCULATED.3IONS-SCNC: 11 MMOL/L (ref 5–15)
AST SERPL-CCNC: 12 U/L (ref 1–32)
BASOPHILS # BLD AUTO: 0.05 10*3/MM3 (ref 0–0.2)
BASOPHILS NFR BLD AUTO: 0.6 % (ref 0–1.5)
BILIRUB SERPL-MCNC: <0.2 MG/DL (ref 0–1.2)
BUN SERPL-MCNC: 20 MG/DL (ref 8–23)
BUN/CREAT SERPL: 23 (ref 7–25)
CALCIUM SPEC-SCNC: 9.1 MG/DL (ref 8.6–10.5)
CHLORIDE SERPL-SCNC: 102 MMOL/L (ref 98–107)
CHOLEST SERPL-MCNC: 258 MG/DL (ref 0–200)
CO2 SERPL-SCNC: 26 MMOL/L (ref 22–29)
CREAT SERPL-MCNC: 0.87 MG/DL (ref 0.57–1)
DEPRECATED RDW RBC AUTO: 43.2 FL (ref 37–54)
EGFRCR SERPLBLD CKD-EPI 2021: 74.5 ML/MIN/1.73
EOSINOPHIL # BLD AUTO: 0.31 10*3/MM3 (ref 0–0.4)
EOSINOPHIL NFR BLD AUTO: 3.9 % (ref 0.3–6.2)
ERYTHROCYTE [DISTWIDTH] IN BLOOD BY AUTOMATED COUNT: 13.5 % (ref 12.3–15.4)
GLOBULIN UR ELPH-MCNC: 3 GM/DL
GLUCOSE SERPL-MCNC: 115 MG/DL (ref 65–99)
HCT VFR BLD AUTO: 40.3 % (ref 34–46.6)
HDLC SERPL-MCNC: 58 MG/DL (ref 40–60)
HGB BLD-MCNC: 12.9 G/DL (ref 12–15.9)
IMM GRANULOCYTES # BLD AUTO: 0.03 10*3/MM3 (ref 0–0.05)
IMM GRANULOCYTES NFR BLD AUTO: 0.4 % (ref 0–0.5)
LDLC SERPL CALC-MCNC: 168 MG/DL (ref 0–100)
LDLC/HDLC SERPL: 2.85 {RATIO}
LYMPHOCYTES # BLD AUTO: 2.7 10*3/MM3 (ref 0.7–3.1)
LYMPHOCYTES NFR BLD AUTO: 33.6 % (ref 19.6–45.3)
MCH RBC QN AUTO: 27.6 PG (ref 26.6–33)
MCHC RBC AUTO-ENTMCNC: 32 G/DL (ref 31.5–35.7)
MCV RBC AUTO: 86.3 FL (ref 79–97)
MONOCYTES # BLD AUTO: 0.42 10*3/MM3 (ref 0.1–0.9)
MONOCYTES NFR BLD AUTO: 5.2 % (ref 5–12)
NEUTROPHILS NFR BLD AUTO: 4.52 10*3/MM3 (ref 1.7–7)
NEUTROPHILS NFR BLD AUTO: 56.3 % (ref 42.7–76)
PLATELET # BLD AUTO: 290 10*3/MM3 (ref 140–450)
PMV BLD AUTO: 8.6 FL (ref 6–12)
POTASSIUM SERPL-SCNC: 3.8 MMOL/L (ref 3.5–5.2)
PROT SERPL-MCNC: 7.1 G/DL (ref 6–8.5)
RBC # BLD AUTO: 4.67 10*6/MM3 (ref 3.77–5.28)
SODIUM SERPL-SCNC: 139 MMOL/L (ref 136–145)
TRIGL SERPL-MCNC: 173 MG/DL (ref 0–150)
TSH SERPL DL<=0.05 MIU/L-ACNC: 1.74 UIU/ML (ref 0.27–4.2)
VLDLC SERPL-MCNC: 32 MG/DL (ref 5–40)
WBC NRBC COR # BLD: 8.03 10*3/MM3 (ref 3.4–10.8)

## 2023-01-24 PROCEDURE — 80061 LIPID PANEL: CPT

## 2023-01-24 PROCEDURE — 36415 COLL VENOUS BLD VENIPUNCTURE: CPT

## 2023-01-24 PROCEDURE — 85025 COMPLETE CBC W/AUTO DIFF WBC: CPT

## 2023-01-24 PROCEDURE — 80053 COMPREHEN METABOLIC PANEL: CPT

## 2023-01-24 PROCEDURE — 84443 ASSAY THYROID STIM HORMONE: CPT

## 2023-03-17 DIAGNOSIS — K21.9 GASTROESOPHAGEAL REFLUX DISEASE WITHOUT ESOPHAGITIS: ICD-10-CM

## 2023-03-20 RX ORDER — OMEPRAZOLE 40 MG/1
40 CAPSULE, DELAYED RELEASE ORAL DAILY
Qty: 90 CAPSULE | Refills: 1 | Status: SHIPPED | OUTPATIENT
Start: 2023-03-20

## 2023-04-24 DIAGNOSIS — G89.29 CHRONIC BILATERAL LOW BACK PAIN WITH SCIATICA, SCIATICA LATERALITY UNSPECIFIED: ICD-10-CM

## 2023-04-24 DIAGNOSIS — M54.40 CHRONIC BILATERAL LOW BACK PAIN WITH SCIATICA, SCIATICA LATERALITY UNSPECIFIED: ICD-10-CM

## 2023-04-24 DIAGNOSIS — K58.0 IRRITABLE BOWEL SYNDROME WITH DIARRHEA: ICD-10-CM

## 2023-04-24 DIAGNOSIS — G43.119 INTRACTABLE MIGRAINE WITH AURA WITHOUT STATUS MIGRAINOSUS: ICD-10-CM

## 2023-04-25 RX ORDER — NAPROXEN 500 MG/1
500 TABLET ORAL 2 TIMES DAILY PRN
Qty: 180 TABLET | Refills: 3 | Status: SHIPPED | OUTPATIENT
Start: 2023-04-25

## 2023-04-25 RX ORDER — TIZANIDINE 4 MG/1
4 TABLET ORAL 4 TIMES DAILY
Qty: 360 TABLET | Refills: 1 | Status: SHIPPED | OUTPATIENT
Start: 2023-04-25

## 2023-04-25 RX ORDER — PROMETHAZINE HYDROCHLORIDE 25 MG/1
25 TABLET ORAL 2 TIMES DAILY PRN
Qty: 180 TABLET | Refills: 0 | Status: SHIPPED | OUTPATIENT
Start: 2023-04-25

## 2023-04-25 RX ORDER — DICYCLOMINE HYDROCHLORIDE 10 MG/1
10 CAPSULE ORAL 2 TIMES DAILY
Qty: 180 CAPSULE | Refills: 1 | Status: SHIPPED | OUTPATIENT
Start: 2023-04-25

## 2023-04-26 ENCOUNTER — OFFICE VISIT (OUTPATIENT)
Dept: FAMILY MEDICINE CLINIC | Age: 63
End: 2023-04-26
Payer: MEDICARE

## 2023-04-26 VITALS
WEIGHT: 211.8 LBS | OXYGEN SATURATION: 96 % | HEIGHT: 64 IN | DIASTOLIC BLOOD PRESSURE: 82 MMHG | BODY MASS INDEX: 36.16 KG/M2 | HEART RATE: 98 BPM | SYSTOLIC BLOOD PRESSURE: 120 MMHG

## 2023-04-26 DIAGNOSIS — E03.9 HYPOTHYROIDISM (ACQUIRED): ICD-10-CM

## 2023-04-26 DIAGNOSIS — E78.2 MIXED HYPERLIPIDEMIA: ICD-10-CM

## 2023-04-26 DIAGNOSIS — Z00.00 ANNUAL PHYSICAL EXAM: Primary | ICD-10-CM

## 2023-04-26 DIAGNOSIS — G43.119 INTRACTABLE MIGRAINE WITH AURA WITHOUT STATUS MIGRAINOSUS: ICD-10-CM

## 2023-04-26 DIAGNOSIS — E88.2 LIPOMATOSIS: ICD-10-CM

## 2023-04-26 DIAGNOSIS — J45.30 MILD PERSISTENT ASTHMA WITHOUT COMPLICATION: ICD-10-CM

## 2023-04-26 DIAGNOSIS — I10 ESSENTIAL HYPERTENSION: ICD-10-CM

## 2023-04-26 RX ORDER — KETOCONAZOLE 20 MG/G
CREAM TOPICAL
COMMUNITY
Start: 2023-03-28

## 2023-04-26 RX ORDER — GABAPENTIN 100 MG/1
CAPSULE ORAL
COMMUNITY
Start: 2023-04-10

## 2023-04-26 RX ORDER — TRIAMCINOLONE ACETONIDE 1 MG/G
CREAM TOPICAL
COMMUNITY
Start: 2023-02-09

## 2023-04-26 NOTE — ASSESSMENT & PLAN NOTE
Blood pressure running at goal.  Not currently requiring antihypertensives.  Continue to monitor closely.  Labs reviewed and up-to-date.

## 2023-04-26 NOTE — PROGRESS NOTES
The ABCs of the Annual Wellness Visit  Subsequent Medicare Wellness Visit    Bay Cesar is a 63 y.o. female who presents for a Subsequent Medicare Wellness Visit.    She is due for colonoscopy, last done 2010 and this was normal.  She is unable to tolerate the bowel prep, so has not been back.   Pap smear is no longer indicated by history; s/p hysterectomy.  She is UTD on mammogram, last done 10/2022 and this was normal.  She is UTD on DEXA, last done 10/2022 and this showed osteopenia.  She is up-to-date on Td (8/2021).  She is due for COVID, Shingrix and [id number].  Flu shot is not currently indicated. She is UTD on including HTN panel and TSH.    The following portions of the patient's history were reviewed and   updated as appropriate: allergies, current medications, past family history, past medical history, past social history, past surgical history and problem list.    Compared to one year ago, the patient feels her physical   health is the same.    Compared to one year ago, the patient feels her mental   health is the same.    Recent Hospitalizations:  She was not admitted to the hospital during the last year.       Current Medical Providers:  Patient Care Team:  Elise Cueto MD as PCP - General (Family Medicine)    Outpatient Medications Prior to Visit   Medication Sig Dispense Refill   • acetaminophen (TYLENOL) 500 MG tablet acetaminophen 500 mg oral tablet take 1 tablet (500 mg) by oral route every 3 hours as needed   Active     • amitriptyline (ELAVIL) 100 MG tablet Take 1 tablet by mouth every night at bedtime. 90 tablet 1   • BIOTIN 5000 PO Take  by mouth.     • Calcium Carb-Cholecalciferol (Caltrate 600+D3 Soft) 600-800 MG-UNIT chewable tablet Caltrate 600 + D 600 mg (1,500 mg)-800 unit oral tablet,chewable chew 1 tablet by oral route daily   Active     • Diclofenac Sodium (VOLTAREN) 1 % gel gel Apply  topically to the appropriate area as directed 2 (Two) Times a Day.  400 g 0   • dicyclomine (BENTYL) 10 MG capsule Take 1 capsule by mouth 2 (Two) Times a Day. 180 capsule 1   • dihydroergotamine (MIGRANAL) 4 MG/ML nasal spray Migranal 0.5 mg/pump act. (4 mg/mL) nasal spray,non-aerosol spray 1 spray (0.5 mg) by intranasal route in each nostril, repeat 15 minutes later for a total of 4 sprays (2 mg); do not exceed 6 sprays (3 mg) per day or 8 sprays (4 mg) per week   Active     • diphenhydrAMINE (BENADRYL) 25 mg capsule Benadryl 25 mg oral capsule take 2 capsules (50 mg) by oral route once daily at bedtime as needed   Active     • gabapentin (NEURONTIN) 100 MG capsule      • galcanezumab-gnlm (Emgality) 120 MG/ML auto-injector pen INJECT 2 MLS (240MG)  SUBCUTANEOUSLY INTO THE SKIN 1  TIME FOR 1 DOSE     • ketoconazole (NIZORAL) 2 % cream APPLY TO THE AFFECTED AREA(S) TWICE DAILY ON CHEST DURING FLARES     • levothyroxine (SYNTHROID, LEVOTHROID) 50 MCG tablet Take 1 tablet by mouth Daily. 90 tablet 1   • metroNIDAZOLE (METROCREAM) 0.75 % cream Apply 1 application topically to the appropriate area as directed 2 (Two) Times a Day. 45 g 1   • naproxen (NAPROSYN) 500 MG tablet Take 1 tablet by mouth 2 (Two) Times a Day As Needed for Mild Pain. Take with food. 180 tablet 3   • omeprazole (priLOSEC) 40 MG capsule Take 1 capsule by mouth Daily. 90 capsule 1   • promethazine (PHENERGAN) 25 MG tablet Take 1 tablet by mouth 2 (Two) Times a Day As Needed for Nausea or Vomiting. 180 tablet 0   • tiZANidine (ZANAFLEX) 4 MG tablet Take 1 tablet by mouth 4 (Four) Times a Day. 360 tablet 1   • triamcinolone (KENALOG) 0.1 % cream APPLY A SMALL, thin layter TO LEFT EYE AREA TWICE DAILY AS DIRECTED FOR 10 DAYS     • Ventolin  (90 Base) MCG/ACT inhaler USE 1 INHALATION BY MOUTH  EVERY 4 HOURS AS NEEDED FOR WHEEZING 54 g 3   • doxycycline (MONODOX) 50 MG capsule TAKE 1 CAPSULE BY MOUTH EVERY DAY FOR 2 WEEKS. (Patient not taking: Reported on 4/26/2023)     • hydrocortisone 2.5 % cream  (Patient not  "taking: Reported on 4/26/2023)       No facility-administered medications prior to visit.       No opioid medication identified on active medication list. I have reviewed chart for other potential  high risk medication/s and harmful drug interactions in the elderly.          Aspirin is not on active medication list.  Aspirin use is not indicated based on review of current medical condition/s. Risk of harm outweighs potential benefits.  .    Patient Active Problem List   Diagnosis   • Allergic rhinitis   • Mild persistent asthma without complication   • Mixed hyperlipidemia   • Essential hypertension   • Lipomatosis   • Intractable migraine with aura without status migrainosus   • Neck pain   • Irritable bowel syndrome with diarrhea   • Gastroesophageal reflux disease without esophagitis   • Rosacea   • Hypothyroidism (acquired)   • Osteopenia of multiple sites   • Esophageal stricture   • Generalized osteoarthritis   • Annual physical exam   • Thyroid nodule   • Intrinsic eczema     Advance Care Planning   Advance Care Planning     Advance Directive is not on file.  ACP discussion was held with the patient during this visit. Patient does not have an advance directive, information provided.     Objective    Vitals:    04/26/23 1259   BP: 120/82   BP Location: Left arm   Patient Position: Sitting   Cuff Size: Large Adult   Pulse: 98   SpO2: 96%   Weight: 96.1 kg (211 lb 12.8 oz)   Height: 162.6 cm (64.02\")     Estimated body mass index is 36.34 kg/m² as calculated from the following:    Height as of this encounter: 162.6 cm (64.02\").    Weight as of this encounter: 96.1 kg (211 lb 12.8 oz).    Class 2 Severe Obesity (BMI >=35 and <=39.9). Obesity-related health conditions include the following: hypertension and dyslipidemias. Obesity is unchanged. BMI is is above average; BMI management plan is completed. We discussed portion control and increasing exercise.      Does the patient have evidence of cognitive impairment? " No          HEALTH RISK ASSESSMENT    Smoking Status:  Social History     Tobacco Use   Smoking Status Never   Smokeless Tobacco Never     Alcohol Consumption:  Social History     Substance and Sexual Activity   Alcohol Use None     Fall Risk Screen:    CESARADI Fall Risk Assessment was completed, and patient is at LOW risk for falls.Assessment completed on:2023    Depression Screenin/26/2023     1:04 PM   PHQ-2/PHQ-9 Depression Screening   Little Interest or Pleasure in Doing Things 0-->not at all   Feeling Down, Depressed or Hopeless 0-->not at all   PHQ-9: Brief Depression Severity Measure Score 0       Health Habits and Functional and Cognitive Screenin/26/2023     1:04 PM   Functional & Cognitive Status   Do you have difficulty preparing food and eating? No   Do you have difficulty bathing yourself, getting dressed or grooming yourself? No   Do you have difficulty using the toilet? No   Do you have difficulty moving around from place to place? No   Do you have trouble with steps or getting out of a bed or a chair? Yes   Current Diet Unhealthy Diet   Dental Exam Up to date   Eye Exam Up to date   Exercise (times per week) 2 times per week   Current Exercises Include Walking   Do you need help using the phone?  No   Are you deaf or do you have serious difficulty hearing?  No   Do you need help with transportation? No   Do you need help shopping? No   Do you need help preparing meals?  No   Do you need help with housework?  No   Do you need help with laundry? No   Do you need help taking your medications? No   Do you need help managing money? No   Do you ever drive or ride in a car without wearing a seat belt? No   Have you felt unusual stress, anger or loneliness in the last month? No   Who do you live with? Alone   If you need help, do you have trouble finding someone available to you? No   Have you been bothered in the last four weeks by sexual problems? No   Do you have difficulty  concentrating, remembering or making decisions? No       Age-appropriate Screening Schedule:  Refer to the list below for future screening recommendations based on patient's age, sex and/or medical conditions. Orders for these recommended tests are listed in the plan section. The patient has been provided with a written plan.    Health Maintenance   Topic Date Due   • COLORECTAL CANCER SCREENING  Never done   • Pneumococcal Vaccine 0-64 (1 - PCV) Never done   • ZOSTER VACCINE (1 of 2) Never done   • COVID-19 Vaccine (1) 04/28/2023 (Originally 1960)   • INFLUENZA VACCINE  08/01/2023   • LIPID PANEL  01/24/2024   • ANNUAL WELLNESS VISIT  04/26/2024   • MAMMOGRAM  10/31/2024   • DXA SCAN  10/31/2024   • TDAP/TD VACCINES (2 - Tdap) 08/04/2031   • HEPATITIS C SCREENING  Completed                  CMS Preventative Services Quick Reference  Risk Factors Identified During Encounter  Immunizations Discussed/Encouraged: Prevnar 20 (Pneumococcal 20-valent conjugate), Shingrix and COVID19  The above risks/problems have been discussed with the patient.  Pertinent information has been shared with the patient in the After Visit Summary.  An After Visit Summary and PPPS were made available to the patient.    Follow Up:   Next Medicare Wellness visit to be scheduled in 1 year.       Additional E&M Note during same encounter follows:  Patient has multiple medical problems which are significant and separately identifiable that require additional work above and beyond the Medicare Wellness Visit.      Chief Complaint  Medicare Wellness-subsequent    Subjective        HPI  Daisy Cesar is also being seen today for f/u of routine problems.    Refractory migraines continue to plague her on a daily basis.  Following with [facility] where she is on Emgality.  She is on amitriptyline, tizanidine, and naproxen, with Migranal for as needed use.  She uses the Migranal only with her worst (frontal) headaches.  Previously, she has  used Lyrica (blurry vision), Maxalt and Imitrex (too sedating), naratriptan (elevated BP), zonisamide (abd pain and diarrhea), topiramate (BPPV).  Flector patches have worked well for her but insurance has declined these.  Diclofenac gel to the back of the neck does give her some relief.  She was getting occipital nerve blocks which gave her relief for a couple of months.  However, she stopped these because it felt as though the pressure in the back and left side of the head became worse afterwards.  She feels as though the lesions in the neck, previously diagnosed as lipomatosis, worsen her headaches.  [facility] evaluated her and did not believe they were lipomas.  She has had some conflicting opinions on this point, as she saw Dermatology as well, who could not give her a definitive diagnosis but felt that the lesions did seem to be lipomas.  She feels like the lesions are getting bigger and spreading (now involving the lower back which is new).  This has been frustrating for her.  She can't bear to have any pressure on the lesions on the back and posterior neck.  She is alternating between heat and ice to try to calm the pain.     She does have a h/o esophageal stricture due to hardware in the throat for which she has required dilation in the past.    She carries a diagnose of BPPV by ENT in the past.       She is on levothyroxine for hypothyroidism.  No cold/heat intolerance or changes in skin or hair.      Unable to tolerate a statin for hyperlipidemia due to myalgias.      She is on omeprazole for GERD.  Denies reflux or heartburn.  She is on dicyclomine for IBS and abdominal cramping.    Review of Systems   Constitutional: Positive for fatigue. Negative for chills and fever.   HENT: Negative for congestion, hearing loss and rhinorrhea.    Eyes: Negative for pain and visual disturbance.   Respiratory: Negative for cough and shortness of breath.    Cardiovascular: Negative for chest pain and  "palpitations.   Gastrointestinal: Negative for abdominal pain, constipation, diarrhea, nausea and vomiting.        Positive for reflux   Genitourinary: Negative for difficulty urinating and dysuria.   Musculoskeletal: Positive for arthralgias, back pain, myalgias and neck pain.   Neurological: Negative for weakness and numbness.   Psychiatric/Behavioral: Negative for dysphoric mood and sleep disturbance. The patient is not nervous/anxious.        Objective   Vital Signs:  /82 (BP Location: Left arm, Patient Position: Sitting, Cuff Size: Large Adult)   Pulse 98   Ht 162.6 cm (64.02\")   Wt 96.1 kg (211 lb 12.8 oz)   SpO2 96%   BMI 36.34 kg/m²     Physical Exam  Vitals reviewed.   Constitutional:       General: She is not in acute distress.     Appearance: Normal appearance. She is well-developed.   HENT:      Head: Normocephalic and atraumatic.      Right Ear: External ear normal.      Left Ear: External ear normal.      Nose: Nose normal.      Mouth/Throat:      Mouth: Mucous membranes are moist.   Eyes:      Extraocular Movements: Extraocular movements intact.      Conjunctiva/sclera: Conjunctivae normal.      Pupils: Pupils are equal, round, and reactive to light.   Cardiovascular:      Rate and Rhythm: Normal rate and regular rhythm.      Heart sounds: No murmur heard.  Pulmonary:      Effort: Pulmonary effort is normal.      Breath sounds: Normal breath sounds. No wheezing, rhonchi or rales.   Abdominal:      General: Bowel sounds are normal. There is no distension.      Palpations: Abdomen is soft.      Tenderness: There is no abdominal tenderness.   Musculoskeletal:         General: Normal range of motion.   Skin:     General: Skin is warm and dry.      Comments: Firm rubbery nodules found diffusely over the trunk and neck   Neurological:      Mental Status: She is alert and oriented to person, place, and time.      Deep Tendon Reflexes: Reflexes normal.   Psychiatric:         Mood and Affect: Mood " and affect normal.         Behavior: Behavior normal.         Thought Content: Thought content normal.         Judgment: Judgment normal.                    Lab Results   Component Value Date    GLUCOSE 115 (H) 01/24/2023    BUN 20 01/24/2023    CREATININE 0.87 01/24/2023    EGFR 74.5 01/24/2023    BCR 23.0 01/24/2023    K 3.8 01/24/2023    CO2 26.0 01/24/2023    CALCIUM 9.1 01/24/2023    ALBUMIN 4.1 01/24/2023    BILITOT <0.2 01/24/2023    AST 12 01/24/2023    ALT 7 01/24/2023       Lab Results   Component Value Date    CHOL 258 (H) 01/24/2023    CHLPL 245 (H) 03/31/2021    TRIG 173 (H) 01/24/2023    HDL 58 01/24/2023     (H) 01/24/2023          Assessment and Plan   Diagnoses and all orders for this visit:    1. Annual physical exam (Primary)  Assessment & Plan:  She is due for colonoscopy, last done 2010 and this was normal.  She is unable to tolerate the bowel prep, so has not been back.  She is going to call her insurance and see if Cologuard is covered.  Pap smear is no longer indicated by history; s/p hysterectomy.  She is UTD on mammogram, last done 10/2022 and this was normal.  She is UTD on DEXA, last done 10/2022 and this showed osteopenia.  She is up-to-date on Td (8/2021).  She is due for COVID, Shingrix and [id number]; declines COVID and [id number].  Shingrix can be done at the pharmacy.  Flu shot is not currently indicated. She is UTD on including HTN panel and TSH.      2. Lipomatosis  Assessment & Plan:  She feels as though the lesions in the neck, previously diagnosed as lipomatosis by [facility]. [facility], worsen her headaches.  [facility] evaluated her a couple of years back and did not believe they were lipomas.  She has had some conflicting opinions on this point, as she saw Dermatology as well around that time, who could not give her a definitive diagnosis but felt that the lesions did seem to be lipomas.  She feels like the lesions are getting bigger and spreading (now  involving the lower back which is new).  This has been frustrating for her.  We are going to send her to [facility] in order to hopefully get a final answer one way or another about the identity of these lesions and how to treat them.    Orders:  -     Ambulatory Referral to Plastic Surgery    3. Intractable migraine with aura without status migrainosus  Assessment & Plan:  Following with [facility].  On Emgality, amitriptyline, Migranal, gabapentin, naproxen, tizanidine and Phenergan.  No refills needed today.  Continue to monitor.  She may discuss with her Neurology PA about going back on injections if it seems like this would be helpful in the future.      4. Essential hypertension  Assessment & Plan:  Blood pressure running at goal.  Not currently requiring antihypertensives.  Continue to monitor closely.  Labs reviewed and up-to-date.      5. Mixed hyperlipidemia  Overview:  Unable to tolerate statins due to myalgias.      6. Hypothyroidism (acquired)  Assessment & Plan:  Stable on levothyroxine 50 mcg daily.  No refills needed.  Labs reviewed and up-to-date.      7. Mild persistent asthma without complication  Assessment & Plan:  Stable.  Not currently requiring maintenance inhalers.  She does carry a rescue albuterol inhaler.  No refills needed.  Continue to monitor closely.                 Follow Up   Return in about 3 months (around 7/26/2023) for Recheck.  Patient was given instructions and counseling regarding her condition or for health maintenance advice. Please see specific information pulled into the AVS if appropriate.

## 2023-04-26 NOTE — ASSESSMENT & PLAN NOTE
She is due for colonoscopy, last done 2010 and this was normal.  She is unable to tolerate the bowel prep, so has not been back.  She is going to call her insurance and see if Cologuard is covered.  Pap smear is no longer indicated by history; s/p hysterectomy.  She is UTD on mammogram, last done 10/2022 and this was normal.  She is UTD on DEXA, last done 10/2022 and this showed osteopenia.  She is up-to-date on Td (8/2021).  She is due for COVID, Shingrix and [id number]; declines COVID and [id number].  Shingrix can be done at the pharmacy.  Flu shot is not currently indicated. She is UTD on including HTN panel and TSH.

## 2023-04-26 NOTE — ASSESSMENT & PLAN NOTE
She feels as though the lesions in the neck, previously diagnosed as lipomatosis by [facility], worsen her headaches.  [facility] evaluated her a couple of years back and did not believe they were lipomas.  She has had some conflicting opinions on this point, as she saw Dermatology as well around that time, who could not give her a definitive diagnosis but felt that the lesions did seem to be lipomas.  She feels like the lesions are getting bigger and spreading (now involving the lower back which is new).  This has been frustrating for her.  We are going to send her to [facility] in order to hopefully get a final answer one way or another about the identity of these lesions and how to treat them.

## 2023-04-26 NOTE — ASSESSMENT & PLAN NOTE
Stable.  Not currently requiring maintenance inhalers.  She does carry a rescue albuterol inhaler.  No refills needed.  Continue to monitor closely.

## 2023-04-26 NOTE — ASSESSMENT & PLAN NOTE
Following with [facility].  On Emgality, amitriptyline, Migranal, gabapentin, naproxen, tizanidine and Phenergan.  No refills needed today.  Continue to monitor.  She may discuss with her Neurology PA about going back on injections if it seems like this would be helpful in the future.

## 2023-05-07 DIAGNOSIS — E03.9 HYPOTHYROIDISM (ACQUIRED): ICD-10-CM

## 2023-05-08 RX ORDER — LEVOTHYROXINE SODIUM 0.05 MG/1
50 TABLET ORAL DAILY
Qty: 90 TABLET | Refills: 1 | Status: SHIPPED | OUTPATIENT
Start: 2023-05-08

## 2023-05-30 NOTE — PROGRESS NOTES
"Consult (Multiple lipomatosis)            History of Present Illness  Daisy Cesar is a 63 y.o. female who presents to [facility] as a consult from Dr. Elise Cueto for Lipomatosis.    Multiple lipomas on collar bone, neck and back.  Very painful at times 10/10.  Has been present for years and getting worse. Previously excised and biopsied in the 1990's.    Patient also has a history of cervical fusion.  She now presents with left arm weakness and pain.  She understands this is probably unrelated to the soft tissue masses, and is seeing a neurologist.  She is concerned about a soft tissue mass on the left supraclavicular fossa, midline posterior neck, right anterior neck      Subjective       Morphine and Penicillins  Allergies Reconciled.    Review of Systems    All review of system has been reviewed and it  is negative except the ones note above.     Objective     /88 (BP Location: Left arm, Patient Position: Sitting)   Pulse 98   Temp 98.4 °F (36.9 °C) (Temporal)   Ht 162.6 cm (64.02\")   Wt 95.5 kg (210 lb 9.6 oz)   SpO2 93%   BMI 36.13 kg/m²     Body mass index is 36.13 kg/m².    Physical Exam   Cardiovascular: Normal rate.     Pulmonary/Chest  Effort normal.   Neck  The patient has indiscrete fullness immediately superior to a transverse posterior incision.  It appears to be over the spinous process, probably C5 or C6.  No overlying skin changes.  Area is tender to light touch.  The patient has visible fullness of the left supraclavicular fossa that is asymmetric with the right supraclavicular fossa.  There is more fullness on the left, but mild to moderately so.  There is no discrete palpable mass in this area.  The patient has point tenderness over the left scapula but no overlying skin changes.  No discrete palpable mass.  Just to the right of the midline anteriorly at the base of the neck the patient states she has a mass, but " again no discrete mass was visible or palpable.    Result Review :       Procedures         Assessment and Plan      Diagnoses and all orders for this visit:    1. 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  Recommendation of completing the imaging and then re-evaluate.    Long discussion with patient regarding the subcutaneous masses.  In my experience, when the mass is not discrete and easily palpable preoperatively, it is often difficult to identify intraoperatively.  Often the fat just looks like other fat in the subcutaneous plane and it is difficult to identify the specific area of pathology.  For the lesion over the left scapula, a blind biopsy is unlikely to cause significant harm.  By blind I mean making an incision to the skin at the area of point tenderness and examining the subcutaneous fat as well as the underlying scapular musculature.  If something is identified it can be biopsied.  Otherwise some of the subcutaneous fat immediately underneath the skin could be submitted for evaluation.  This may or may not help with her pain.  For the left supraclavicular fullness, things become much more complicated.  This is an anatomically dangerous area with large branches coming off of the aorta as well as going down to the vena cava, the brachial plexus as well as the apex of the lung all being relatively close to the skin in this 1 particular area.  Blindly examining this area could cause significant injury to the structures, pneumothorax (dropping a lung), damage to the drainage of the lymphatic system into the vena cava, damage to the brachial plexus causing weakness or paralysis or altered sensation to the left arm, etc.    The patient is scheduled for an MRI of the neck.  An MRI is an excellent way to evaluate soft tissue.  If the MRI could extend down far enough to include the tissue immediately  adjacent to the left clavicle, we could use the MRI to see if there is a distinct anatomic abnormality that may be amenable to surgical extirpation.  If there is something discrete that we could remove in surgery.  The MRI would also evaluate the soft tissue over C5 spinous process.  If the patient were undergoing a procedure for anatomically discrete areas amenable to removal, then it might make sense to excise the lesion over the left scapula as long as the patient understands that it is unpredictable as to whether she would get improvement from her area of point tenderness.        Follow Up     Return 2 weeks after MRI.    Patient was given instructions and counseling regarding her condition. Please see specific information pulled into the AVS if appropriate.     Suleiman Brothers MD  06/08/2023

## 2023-06-08 ENCOUNTER — OFFICE VISIT (OUTPATIENT)
Dept: PLASTIC SURGERY | Facility: CLINIC | Age: 63
End: 2023-06-08
Payer: MEDICARE

## 2023-06-08 VITALS
OXYGEN SATURATION: 93 % | DIASTOLIC BLOOD PRESSURE: 88 MMHG | WEIGHT: 210.6 LBS | HEIGHT: 64 IN | HEART RATE: 98 BPM | BODY MASS INDEX: 35.96 KG/M2 | SYSTOLIC BLOOD PRESSURE: 148 MMHG | TEMPERATURE: 98.4 F

## 2023-06-08 DIAGNOSIS — D48.7 NEOPLASM OF UNCERTAIN BEHAVIOR OF NECK: Primary | ICD-10-CM

## 2023-06-17 DIAGNOSIS — G43.119 INTRACTABLE MIGRAINE WITH AURA WITHOUT STATUS MIGRAINOSUS: ICD-10-CM

## 2023-06-19 RX ORDER — AMITRIPTYLINE HYDROCHLORIDE 100 MG/1
100 TABLET, FILM COATED ORAL
Qty: 90 TABLET | Refills: 1 | Status: SHIPPED | OUTPATIENT
Start: 2023-06-19

## 2023-08-03 ENCOUNTER — OFFICE VISIT (OUTPATIENT)
Dept: FAMILY MEDICINE CLINIC | Age: 63
End: 2023-08-03
Payer: MEDICARE

## 2023-08-03 VITALS
WEIGHT: 210 LBS | HEIGHT: 64 IN | HEART RATE: 85 BPM | BODY MASS INDEX: 35.85 KG/M2 | DIASTOLIC BLOOD PRESSURE: 83 MMHG | OXYGEN SATURATION: 95 % | SYSTOLIC BLOOD PRESSURE: 141 MMHG

## 2023-08-03 DIAGNOSIS — E03.9 HYPOTHYROIDISM (ACQUIRED): ICD-10-CM

## 2023-08-03 DIAGNOSIS — E88.2 LIPOMATOSIS: ICD-10-CM

## 2023-08-03 DIAGNOSIS — K21.9 GASTROESOPHAGEAL REFLUX DISEASE WITHOUT ESOPHAGITIS: ICD-10-CM

## 2023-08-03 DIAGNOSIS — K22.2 ESOPHAGEAL STRICTURE: ICD-10-CM

## 2023-08-03 DIAGNOSIS — G43.119 INTRACTABLE MIGRAINE WITH AURA WITHOUT STATUS MIGRAINOSUS: Primary | ICD-10-CM

## 2023-08-03 DIAGNOSIS — L71.9 ROSACEA: ICD-10-CM

## 2023-08-03 PROBLEM — Z00.00 ANNUAL PHYSICAL EXAM: Status: RESOLVED | Noted: 2022-04-22 | Resolved: 2023-08-03

## 2023-08-03 PROCEDURE — 3077F SYST BP >= 140 MM HG: CPT | Performed by: FAMILY MEDICINE

## 2023-08-03 PROCEDURE — 1159F MED LIST DOCD IN RCRD: CPT | Performed by: FAMILY MEDICINE

## 2023-08-03 PROCEDURE — 99214 OFFICE O/P EST MOD 30 MIN: CPT | Performed by: FAMILY MEDICINE

## 2023-08-03 PROCEDURE — 3079F DIAST BP 80-89 MM HG: CPT | Performed by: FAMILY MEDICINE

## 2023-08-03 PROCEDURE — 1160F RVW MEDS BY RX/DR IN RCRD: CPT | Performed by: FAMILY MEDICINE

## 2023-08-07 ENCOUNTER — HOSPITAL ENCOUNTER (OUTPATIENT)
Dept: MRI IMAGING | Facility: HOSPITAL | Age: 63
Discharge: HOME | End: 2023-08-07
Payer: MEDICARE

## 2023-08-07 DIAGNOSIS — M47.812 CERVICAL SPONDYLOSIS: ICD-10-CM

## 2023-08-07 DIAGNOSIS — M54.2 CHRONIC NECK PAIN: ICD-10-CM

## 2023-08-07 DIAGNOSIS — G89.29 CHRONIC NECK PAIN: ICD-10-CM

## 2023-08-07 DIAGNOSIS — R42 DIZZINESS: ICD-10-CM

## 2023-08-07 DIAGNOSIS — G43.709 CHRONIC MIGRAINE WITHOUT AURA WITHOUT STATUS MIGRAINOSUS, NOT INTRACTABLE: ICD-10-CM

## 2023-08-07 LAB
CREAT BLDA-MCNC: 0.8 MG/DL
EGFRCR SERPLBLD CKD-EPI 2021: 82.4 ML/MIN/1.73

## 2023-08-07 PROCEDURE — 72156 MRI NECK SPINE W/O & W/DYE: CPT

## 2023-08-07 PROCEDURE — 0 GADOBENATE DIMEGLUMINE 529 MG/ML SOLUTION: Performed by: PHYSICIAN ASSISTANT

## 2023-08-07 PROCEDURE — A9577 INJ MULTIHANCE: HCPCS | Performed by: PHYSICIAN ASSISTANT

## 2023-08-07 PROCEDURE — 82565 ASSAY OF CREATININE: CPT

## 2023-08-07 PROCEDURE — 70553 MRI BRAIN STEM W/O & W/DYE: CPT

## 2023-08-07 RX ADMIN — GADOBENATE DIMEGLUMINE 20 ML: 529 INJECTION, SOLUTION INTRAVENOUS at 13:21

## 2023-08-15 DIAGNOSIS — K21.9 GASTROESOPHAGEAL REFLUX DISEASE WITHOUT ESOPHAGITIS: ICD-10-CM

## 2023-08-15 DIAGNOSIS — J45.30 MILD PERSISTENT ASTHMA WITHOUT COMPLICATION: ICD-10-CM

## 2023-08-15 RX ORDER — OMEPRAZOLE 40 MG/1
40 CAPSULE, DELAYED RELEASE ORAL DAILY
Qty: 90 CAPSULE | Refills: 1 | Status: SHIPPED | OUTPATIENT
Start: 2023-08-15

## 2023-08-15 RX ORDER — ALBUTEROL SULFATE 90 UG/1
AEROSOL, METERED RESPIRATORY (INHALATION)
Qty: 54 G | Refills: 3 | Status: SHIPPED | OUTPATIENT
Start: 2023-08-15

## 2023-08-28 DIAGNOSIS — G43.119 INTRACTABLE MIGRAINE WITH AURA WITHOUT STATUS MIGRAINOSUS: ICD-10-CM

## 2023-08-29 RX ORDER — PROMETHAZINE HYDROCHLORIDE 25 MG/1
TABLET ORAL
Qty: 180 TABLET | Refills: 0 | Status: SHIPPED | OUTPATIENT
Start: 2023-08-29

## 2023-09-10 DIAGNOSIS — E03.9 HYPOTHYROIDISM (ACQUIRED): ICD-10-CM

## 2023-09-11 RX ORDER — LEVOTHYROXINE SODIUM 0.05 MG/1
50 TABLET ORAL DAILY
Qty: 90 TABLET | Refills: 1 | Status: SHIPPED | OUTPATIENT
Start: 2023-09-11

## 2023-09-25 DIAGNOSIS — G43.119 INTRACTABLE MIGRAINE WITH AURA WITHOUT STATUS MIGRAINOSUS: ICD-10-CM

## 2023-09-25 DIAGNOSIS — K58.0 IRRITABLE BOWEL SYNDROME WITH DIARRHEA: ICD-10-CM

## 2023-09-26 RX ORDER — DICYCLOMINE HYDROCHLORIDE 10 MG/1
CAPSULE ORAL
Qty: 180 CAPSULE | Refills: 1 | Status: SHIPPED | OUTPATIENT
Start: 2023-09-26

## 2023-09-26 RX ORDER — TIZANIDINE 4 MG/1
TABLET ORAL
Qty: 360 TABLET | Refills: 1 | Status: SHIPPED | OUTPATIENT
Start: 2023-09-26

## 2023-11-01 ENCOUNTER — TELEPHONE (OUTPATIENT)
Dept: FAMILY MEDICINE CLINIC | Age: 63
End: 2023-11-01
Payer: MEDICARE

## 2023-11-01 DIAGNOSIS — Z12.31 ENCOUNTER FOR SCREENING MAMMOGRAM FOR MALIGNANT NEOPLASM OF BREAST: Primary | ICD-10-CM

## 2023-11-01 NOTE — TELEPHONE ENCOUNTER
----- Message from Elise Cueto MD sent at 11/1/2022  5:20 PM EDT -----  Regarding: f/u screening mammo  Please call pt to let her know that she is due for her yearly screening mammogram.  Please pend order for screening mammo and send to me.  Thanks, DILMA

## 2023-11-02 DIAGNOSIS — G43.119 INTRACTABLE MIGRAINE WITH AURA WITHOUT STATUS MIGRAINOSUS: ICD-10-CM

## 2023-11-02 RX ORDER — PROMETHAZINE HYDROCHLORIDE 25 MG/1
TABLET ORAL
Qty: 180 TABLET | Refills: 0 | Status: SHIPPED | OUTPATIENT
Start: 2023-11-02

## 2023-11-06 ENCOUNTER — TELEPHONE (OUTPATIENT)
Dept: FAMILY MEDICINE CLINIC | Age: 63
End: 2023-11-06
Payer: MEDICARE

## 2023-11-06 DIAGNOSIS — E04.1 THYROID NODULE: Primary | ICD-10-CM

## 2023-11-06 NOTE — TELEPHONE ENCOUNTER
----- Message from Elise Cueto MD sent at 11/4/2022  9:54 PM EDT -----  Regarding: f/u thyroid U/S  Please call pt to let her know that she is due for her yearly thyroid U/S, dx multinodular goiter.  Please pend order and send to me.  Thanks, DILMA

## 2023-11-09 ENCOUNTER — LAB (OUTPATIENT)
Dept: LAB | Facility: HOSPITAL | Age: 63
End: 2023-11-09
Payer: MEDICARE

## 2023-11-09 ENCOUNTER — OFFICE VISIT (OUTPATIENT)
Dept: FAMILY MEDICINE CLINIC | Age: 63
End: 2023-11-09
Payer: MEDICARE

## 2023-11-09 VITALS
HEIGHT: 64 IN | DIASTOLIC BLOOD PRESSURE: 84 MMHG | OXYGEN SATURATION: 96 % | WEIGHT: 211 LBS | BODY MASS INDEX: 36.02 KG/M2 | HEART RATE: 85 BPM | SYSTOLIC BLOOD PRESSURE: 135 MMHG

## 2023-11-09 DIAGNOSIS — I10 ESSENTIAL HYPERTENSION: ICD-10-CM

## 2023-11-09 DIAGNOSIS — E03.9 HYPOTHYROIDISM (ACQUIRED): ICD-10-CM

## 2023-11-09 DIAGNOSIS — E78.2 MIXED HYPERLIPIDEMIA: ICD-10-CM

## 2023-11-09 DIAGNOSIS — J45.30 MILD PERSISTENT ASTHMA WITHOUT COMPLICATION: ICD-10-CM

## 2023-11-09 DIAGNOSIS — K58.0 IRRITABLE BOWEL SYNDROME WITH DIARRHEA: ICD-10-CM

## 2023-11-09 DIAGNOSIS — G43.119 INTRACTABLE MIGRAINE WITH AURA WITHOUT STATUS MIGRAINOSUS: Primary | ICD-10-CM

## 2023-11-09 DIAGNOSIS — M54.12 CERVICAL RADICULOPATHY: ICD-10-CM

## 2023-11-09 LAB
ALBUMIN SERPL-MCNC: 4.2 G/DL (ref 3.5–5.2)
ALBUMIN/GLOB SERPL: 1.6 G/DL
ALP SERPL-CCNC: 99 U/L (ref 39–117)
ALT SERPL W P-5'-P-CCNC: 12 U/L (ref 1–33)
ANION GAP SERPL CALCULATED.3IONS-SCNC: 12.3 MMOL/L (ref 5–15)
AST SERPL-CCNC: 11 U/L (ref 1–32)
BASOPHILS # BLD AUTO: 0.05 10*3/MM3 (ref 0–0.2)
BASOPHILS NFR BLD AUTO: 0.7 % (ref 0–1.5)
BILIRUB SERPL-MCNC: <0.2 MG/DL (ref 0–1.2)
BUN SERPL-MCNC: 12 MG/DL (ref 8–23)
BUN/CREAT SERPL: 14.8 (ref 7–25)
CALCIUM SPEC-SCNC: 9.3 MG/DL (ref 8.6–10.5)
CHLORIDE SERPL-SCNC: 102 MMOL/L (ref 98–107)
CHOLEST SERPL-MCNC: 262 MG/DL (ref 0–200)
CO2 SERPL-SCNC: 24.7 MMOL/L (ref 22–29)
CREAT SERPL-MCNC: 0.81 MG/DL (ref 0.57–1)
DEPRECATED RDW RBC AUTO: 44.4 FL (ref 37–54)
EGFRCR SERPLBLD CKD-EPI 2021: 81.2 ML/MIN/1.73
EOSINOPHIL # BLD AUTO: 0.26 10*3/MM3 (ref 0–0.4)
EOSINOPHIL NFR BLD AUTO: 3.7 % (ref 0.3–6.2)
ERYTHROCYTE [DISTWIDTH] IN BLOOD BY AUTOMATED COUNT: 13.9 % (ref 12.3–15.4)
GLOBULIN UR ELPH-MCNC: 2.7 GM/DL
GLUCOSE SERPL-MCNC: 99 MG/DL (ref 65–99)
HCT VFR BLD AUTO: 38.6 % (ref 34–46.6)
HDLC SERPL-MCNC: 57 MG/DL (ref 40–60)
HGB BLD-MCNC: 12.5 G/DL (ref 12–15.9)
IMM GRANULOCYTES # BLD AUTO: 0.03 10*3/MM3 (ref 0–0.05)
IMM GRANULOCYTES NFR BLD AUTO: 0.4 % (ref 0–0.5)
LDLC SERPL CALC-MCNC: 160 MG/DL (ref 0–100)
LDLC/HDLC SERPL: 2.74 {RATIO}
LYMPHOCYTES # BLD AUTO: 2.09 10*3/MM3 (ref 0.7–3.1)
LYMPHOCYTES NFR BLD AUTO: 29.4 % (ref 19.6–45.3)
MCH RBC QN AUTO: 28 PG (ref 26.6–33)
MCHC RBC AUTO-ENTMCNC: 32.4 G/DL (ref 31.5–35.7)
MCV RBC AUTO: 86.4 FL (ref 79–97)
MONOCYTES # BLD AUTO: 0.4 10*3/MM3 (ref 0.1–0.9)
MONOCYTES NFR BLD AUTO: 5.6 % (ref 5–12)
NEUTROPHILS NFR BLD AUTO: 4.29 10*3/MM3 (ref 1.7–7)
NEUTROPHILS NFR BLD AUTO: 60.2 % (ref 42.7–76)
PLATELET # BLD AUTO: 280 10*3/MM3 (ref 140–450)
PMV BLD AUTO: 8.6 FL (ref 6–12)
POTASSIUM SERPL-SCNC: 4.1 MMOL/L (ref 3.5–5.2)
PROT SERPL-MCNC: 6.9 G/DL (ref 6–8.5)
RBC # BLD AUTO: 4.47 10*6/MM3 (ref 3.77–5.28)
SODIUM SERPL-SCNC: 139 MMOL/L (ref 136–145)
TRIGL SERPL-MCNC: 245 MG/DL (ref 0–150)
TSH SERPL DL<=0.05 MIU/L-ACNC: 1.62 UIU/ML (ref 0.27–4.2)
VLDLC SERPL-MCNC: 45 MG/DL (ref 5–40)
WBC NRBC COR # BLD: 7.12 10*3/MM3 (ref 3.4–10.8)

## 2023-11-09 PROCEDURE — 3075F SYST BP GE 130 - 139MM HG: CPT | Performed by: FAMILY MEDICINE

## 2023-11-09 PROCEDURE — 3079F DIAST BP 80-89 MM HG: CPT | Performed by: FAMILY MEDICINE

## 2023-11-09 PROCEDURE — 36415 COLL VENOUS BLD VENIPUNCTURE: CPT

## 2023-11-09 PROCEDURE — 80053 COMPREHEN METABOLIC PANEL: CPT

## 2023-11-09 PROCEDURE — 85025 COMPLETE CBC W/AUTO DIFF WBC: CPT

## 2023-11-09 PROCEDURE — 80061 LIPID PANEL: CPT

## 2023-11-09 PROCEDURE — 84443 ASSAY THYROID STIM HORMONE: CPT

## 2023-11-09 PROCEDURE — 99214 OFFICE O/P EST MOD 30 MIN: CPT | Performed by: FAMILY MEDICINE

## 2023-11-09 RX ORDER — MONTELUKAST SODIUM 4 MG/1
1 TABLET, CHEWABLE ORAL
COMMUNITY
Start: 2023-10-10 | End: 2024-01-08

## 2023-11-09 NOTE — ASSESSMENT & PLAN NOTE
Stable on levothyroxine 50 mcg daily.  Refills were not needed today.  Labs were due today.  Continue to monitor.

## 2023-11-09 NOTE — ASSESSMENT & PLAN NOTE
Stable on dicyclomine 10 mg 3 times daily as needed.  No refills needed today.  She did have a colonoscopy done over this summer by Dr. Whitt and this was unremarkable..

## 2023-11-09 NOTE — ASSESSMENT & PLAN NOTE
Following with [facility].  On Emgality, amitriptyline, Migranal, gabapentin, naproxen, tizanidine and Phenergan.  No refills needed today.  Continue to monitor.

## 2023-11-09 NOTE — ASSESSMENT & PLAN NOTE
Now following with Juanis Crisostomo at [facility].  They are working on evaluation for possible RFA.  She did have epidural done back in September which unfortunately did not give her significant relief.  Dr. Benitez attempted to start her on a compounded pain cream but this was too expensive.  Cristal does continue on her gabapentin, naproxen, tizanidine and amitriptyline.

## 2023-11-09 NOTE — PROGRESS NOTES
Chief Complaint  Migraine (3 month f/u)    Subjective          Daisy Cesar presents to [facility] today for routine f/u of chronic issues.    Refractory migraines continue to plague her on a daily basis.  She follows with [facility].  She is on Emgality, gabapentin, amitriptyline, tizanidine, and naproxen, with Migranal for prn use.  She uses the Migranal only with her worst (frontal) headaches.  Previously, she has used Lyrica (blurry vision), Maxalt and Imitrex (too sedating), naratriptan (elevated BP), zonisamide (abd pain and diarrhea), topiramate (BPPV).  Flector patches have worked well for her but insurance has declined these.  Diclofenac gel to the back of the neck does give her some relief.  She was getting occipital nerve blocks which gave her relief for a couple of months.  However, she stopped these because it felt as though the pressure in the back and left side of the head became worse afterwards.  She feels as though the lesions in the neck, previously diagnosed as lipomatosis, worsen her headaches.  Dr. [facility] evaluated her and did not believe they were lipomas.  She has had some conflicting opinions on this point, as she saw Dermatology as well, who could not give her a definitive diagnosis but felt that the lesions did seem to be lipomas.  She was seen by a second plastic surgeon, Dr. Brothers, over the summer but he left the system before the workup was completed.  Neurology did complete MRI C spine and brain in August; those reports are not available today but OV notes indicate they were unremarkable.  She is now following with Pain Management Dr. Benitez for spondylosis and cervical radiculopathy.  She had epidural done at the end of Sept.  That gave only limited relief, unfortunately.  They are now working towards possible RFA with medial branch block being scheduled.  They also discussed the possibility of implanted nerve stimulator  "but Cristal is hesitant about that option.      She has had problems with BPPV in the past, diagnosed by ENT.      She is on levothyroxine for hypothyroidism.  No heat/cold intolerance, no changes in hair or skin      Unable to tolerate a statin for hyperlipidemia due to myalgias.      She is on omeprazole for GERD.  No indigestion or reflux.  She is on dicyclomine for IBS and abdominal cramping.  She has had some epigastric pain.  Worse with eating or drinking.   She does have a history of esophageal stricture that has required dilatation.  Now following with Dr. Whitt.  She had colonoscopy done with Dr. Whitt in Sept.  \"I'm not doing that again.\"  She went back to see him yesterday about the dysphagia.  She is still having some problems with sensation of food and pills getting stuck.  He is sending her for barium swallow to [facility].       Current Outpatient Medications:     acetaminophen (TYLENOL) 500 MG tablet, acetaminophen 500 mg oral tablet take 1 tablet (500 mg) by oral route every 3 hours as needed   Active, Disp: , Rfl:     amitriptyline (ELAVIL) 100 MG tablet, Take 1 tablet by mouth every night at bedtime., Disp: 90 tablet, Rfl: 1    BIOTIN 5000 PO, Take  by mouth., Disp: , Rfl:     colestipol (COLESTID) 1 g tablet, Take 1 tablet by mouth., Disp: , Rfl:     Diclofenac Sodium (VOLTAREN) 1 % gel gel, APPLY TOPICALLY 2 GRAMS TO THE  APPROPRIATE AREA AS DIRECTED  TWICE DAILY, Disp: 400 g, Rfl: 0    dicyclomine (BENTYL) 10 MG capsule, TAKE 1 CAPSULE BY MOUTH TWICE  DAILY, Disp: 180 capsule, Rfl: 1    dihydroergotamine (MIGRANAL) 4 MG/ML nasal spray, Migranal 0.5 mg/pump act. (4 mg/mL) nasal spray,non-aerosol spray 1 spray (0.5 mg) by intranasal route in each nostril, repeat 15 minutes later for a total of 4 sprays (2 mg); do not exceed 6 sprays (3 mg) per day or 8 sprays (4 mg) per week   Active, Disp: , Rfl:     diphenhydrAMINE (BENADRYL) 25 mg capsule, Benadryl 25 mg oral capsule take 2 capsules (50 mg) by oral " "route once daily at bedtime as needed   Active, Disp: , Rfl:     gabapentin (NEURONTIN) 100 MG capsule, , Disp: , Rfl:     galcanezumab-gnlm (Emgality) 120 MG/ML auto-injector pen, INJECT 2 MLS (240MG)  SUBCUTANEOUSLY INTO THE SKIN 1  TIME FOR 1 DOSE, Disp: , Rfl:     ketoconazole (NIZORAL) 2 % cream, APPLY TO THE AFFECTED AREA(S) TWICE DAILY ON CHEST DURING FLARES, Disp: , Rfl:     levothyroxine (SYNTHROID, LEVOTHROID) 50 MCG tablet, Take 1 tablet by mouth Daily., Disp: 90 tablet, Rfl: 1    metroNIDAZOLE (METROCREAM) 0.75 % cream, Apply 1 application  topically to the appropriate area as directed 2 (Two) Times a Day., Disp: 45 g, Rfl: 1    naproxen (NAPROSYN) 500 MG tablet, Take 1 tablet by mouth 2 (Two) Times a Day As Needed for Mild Pain. Take with food., Disp: 180 tablet, Rfl: 3    omeprazole (priLOSEC) 40 MG capsule, Take 1 capsule by mouth Daily., Disp: 90 capsule, Rfl: 1    promethazine (PHENERGAN) 25 MG tablet, TAKE 1 TABLET BY MOUTH TWICE  DAILY AS NEEDED FOR NAUSEA OR  VOMITING, Disp: 180 tablet, Rfl: 0    tiZANidine (ZANAFLEX) 4 MG tablet, TAKE 1 TABLET BY MOUTH 4 TIMES  DAILY, Disp: 360 tablet, Rfl: 1    triamcinolone (KENALOG) 0.1 % cream, APPLY A SMALL, thin layter TO LEFT EYE AREA TWICE DAILY AS DIRECTED FOR 10 DAYS, Disp: , Rfl:     Ventolin  (90 Base) MCG/ACT inhaler, USE 1 INHALATION BY MOUTH  EVERY 4 HOURS AS NEEDED FOR WHEEZING, Disp: 54 g, Rfl: 3    Calcium Carb-Cholecalciferol (Caltrate 600+D3 Soft) 600-800 MG-UNIT chewable tablet, Caltrate 600 + D 600 mg (1,500 mg)-800 unit oral tablet,chewable chew 1 tablet by oral route daily   Active, Disp: , Rfl:     Allergies:  Morphine and Penicillins      Objective   Vital Signs:   Vitals:    11/09/23 0918   BP: 135/84   BP Location: Right arm   Patient Position: Sitting   Cuff Size: Large Adult   Pulse: 85   SpO2: 96%   Weight: 95.7 kg (211 lb)   Height: 162.6 cm (64.02\")         Physical Exam  Vitals reviewed.   Constitutional:       General: " She is not in acute distress.     Appearance: Normal appearance. She is well-developed.   HENT:      Head: Normocephalic and atraumatic.      Right Ear: External ear normal.      Left Ear: External ear normal.      Nose: Nose normal.      Mouth/Throat:      Mouth: Mucous membranes are moist.   Eyes:      Extraocular Movements: Extraocular movements intact.      Conjunctiva/sclera: Conjunctivae normal.      Pupils: Pupils are equal, round, and reactive to light.   Cardiovascular:      Rate and Rhythm: Normal rate and regular rhythm.      Heart sounds: No murmur heard.  Pulmonary:      Effort: Pulmonary effort is normal.      Breath sounds: Normal breath sounds. No wheezing, rhonchi or rales.   Abdominal:      General: Bowel sounds are normal. There is no distension.      Palpations: Abdomen is soft.      Tenderness: There is no abdominal tenderness.   Musculoskeletal:         General: Normal range of motion.   Skin:     General: Skin is warm and dry.      Comments: Firm rubbery nodules found diffusely over the trunk and neck   Neurological:      Mental Status: She is alert.   Psychiatric:         Mood and Affect: Affect normal.          Lab Results   Component Value Date    GLUCOSE 115 (H) 01/24/2023    BUN 12 10/24/2023    CREATININE 0.80 08/07/2023    BCR 23.0 01/24/2023    K 3.8 01/24/2023    CO2 26.0 01/24/2023    CALCIUM 9.1 01/24/2023    ALBUMIN 4.1 01/24/2023    LABIL2 1.4 03/31/2021    AST 12 01/24/2023    ALT 7 01/24/2023       Lab Results   Component Value Date    CHOL 258 (H) 01/24/2023    CHLPL 245 (H) 03/31/2021    TRIG 173 (H) 01/24/2023    HDL 58 01/24/2023     (H) 01/24/2023            Assessment and Plan    Diagnoses and all orders for this visit:    1. Intractable migraine with aura without status migrainosus (Primary)  Assessment & Plan:  Following with [facility].  On Emgality, amitriptyline, Migranal, gabapentin, naproxen, tizanidine and Phenergan.  No refills needed today.   Continue to monitor.      2. Hypothyroidism (acquired)  Assessment & Plan:  Stable on levothyroxine 50 mcg daily.  Refills were not needed today.  Labs were due today.  Continue to monitor.      Orders:  -     TSH; Future    3. Essential hypertension  Assessment & Plan:  Diet controlled.  Checking labs today.    Orders:  -     Lipid Panel; Future  -     Comprehensive Metabolic Panel; Future  -     CBC Auto Differential; Future    4. Mixed hyperlipidemia  Overview:  Unable to tolerate statins due to myalgias.      5. Mild persistent asthma without complication  Assessment & Plan:  Stable.  Has albuterol rescue inhaler but rarely needs this.      6. Irritable bowel syndrome with diarrhea  Assessment & Plan:  Stable on dicyclomine 10 mg 3 times daily as needed.  No refills needed today.  She did have a colonoscopy done over this summer by Dr. Whitt and this was unremarkable..      7. Cervical radiculopathy  Assessment & Plan:  Now following with Juanis Crisostomo at [facility].  They are working on evaluation for possible RFA.  She did have epidural done back in September which unfortunately did not give her significant relief.  Dr. Benitez attempted to start her on a compounded pain cream but this was too expensive.  Cristal does continue on her gabapentin, naproxen, tizanidine and amitriptyline.            Follow Up   Return in about 3 months (around 2/9/2024) for Recheck.  Patient was given instructions and counseling regarding her condition or for health maintenance advice. Please see specific information pulled into the AVS if appropriate.

## 2023-11-13 DIAGNOSIS — G43.119 INTRACTABLE MIGRAINE WITH AURA WITHOUT STATUS MIGRAINOSUS: ICD-10-CM

## 2023-11-13 RX ORDER — AMITRIPTYLINE HYDROCHLORIDE 100 MG/1
100 TABLET ORAL
Qty: 90 TABLET | Refills: 1 | Status: SHIPPED | OUTPATIENT
Start: 2023-11-13

## 2023-12-13 ENCOUNTER — HOSPITAL ENCOUNTER (OUTPATIENT)
Dept: MAMMOGRAPHY | Facility: HOSPITAL | Age: 63
Discharge: HOME | End: 2023-12-13
Payer: MEDICARE

## 2023-12-13 ENCOUNTER — HOSPITAL ENCOUNTER (OUTPATIENT)
Dept: ULTRASOUND IMAGING | Facility: HOSPITAL | Age: 63
Discharge: HOME | End: 2023-12-13
Payer: MEDICARE

## 2023-12-13 DIAGNOSIS — Z12.31 ENCOUNTER FOR SCREENING MAMMOGRAM FOR MALIGNANT NEOPLASM OF BREAST: ICD-10-CM

## 2023-12-13 PROCEDURE — 76536 US EXAM OF HEAD AND NECK: CPT

## 2023-12-13 PROCEDURE — 77063 BREAST TOMOSYNTHESIS BI: CPT

## 2023-12-13 PROCEDURE — 77067 SCR MAMMO BI INCL CAD: CPT

## 2023-12-26 DIAGNOSIS — L71.9 ROSACEA: ICD-10-CM

## 2024-01-08 DIAGNOSIS — G43.119 INTRACTABLE MIGRAINE WITH AURA WITHOUT STATUS MIGRAINOSUS: ICD-10-CM

## 2024-01-09 DIAGNOSIS — G43.119 INTRACTABLE MIGRAINE WITH AURA WITHOUT STATUS MIGRAINOSUS: ICD-10-CM

## 2024-01-09 RX ORDER — PROMETHAZINE HYDROCHLORIDE 25 MG/1
25 TABLET ORAL 2 TIMES DAILY PRN
Qty: 180 TABLET | Refills: 0 | Status: SHIPPED | OUTPATIENT
Start: 2024-01-09

## 2024-01-17 ENCOUNTER — TELEPHONE (OUTPATIENT)
Dept: FAMILY MEDICINE CLINIC | Age: 64
End: 2024-01-17
Payer: MEDICARE

## 2024-01-17 DIAGNOSIS — G43.119 INTRACTABLE MIGRAINE WITH AURA WITHOUT STATUS MIGRAINOSUS: ICD-10-CM

## 2024-01-17 NOTE — TELEPHONE ENCOUNTER
Notification from [facility] stating the diclofenac gel 1% is currently out of stock.  There is no good alternative prescription medication, so I would recommend to Cristal that she have this filled at a local pharmacy until [facility] can get it back in stock.  Where would she like us to send the prescription for the diclofenac gel?  Thanks, DILMA

## 2024-01-23 DIAGNOSIS — E03.9 HYPOTHYROIDISM (ACQUIRED): ICD-10-CM

## 2024-01-23 DIAGNOSIS — G89.29 CHRONIC BILATERAL LOW BACK PAIN WITH SCIATICA, SCIATICA LATERALITY UNSPECIFIED: ICD-10-CM

## 2024-01-23 DIAGNOSIS — G43.119 INTRACTABLE MIGRAINE WITH AURA WITHOUT STATUS MIGRAINOSUS: ICD-10-CM

## 2024-01-23 DIAGNOSIS — K21.9 GASTROESOPHAGEAL REFLUX DISEASE WITHOUT ESOPHAGITIS: ICD-10-CM

## 2024-01-23 DIAGNOSIS — M54.40 CHRONIC BILATERAL LOW BACK PAIN WITH SCIATICA, SCIATICA LATERALITY UNSPECIFIED: ICD-10-CM

## 2024-01-23 DIAGNOSIS — J45.30 MILD PERSISTENT ASTHMA WITHOUT COMPLICATION: ICD-10-CM

## 2024-01-23 DIAGNOSIS — K58.0 IRRITABLE BOWEL SYNDROME WITH DIARRHEA: ICD-10-CM

## 2024-01-23 RX ORDER — PROMETHAZINE HYDROCHLORIDE 25 MG/1
25 TABLET ORAL 2 TIMES DAILY PRN
Qty: 180 TABLET | Refills: 0 | Status: SHIPPED | OUTPATIENT
Start: 2024-01-23

## 2024-01-23 RX ORDER — DICYCLOMINE HYDROCHLORIDE 10 MG/1
10 CAPSULE ORAL 2 TIMES DAILY
Qty: 180 CAPSULE | Refills: 1 | Status: SHIPPED | OUTPATIENT
Start: 2024-01-23

## 2024-01-23 RX ORDER — OMEPRAZOLE 40 MG/1
40 CAPSULE, DELAYED RELEASE ORAL DAILY
Qty: 90 CAPSULE | Refills: 1 | Status: SHIPPED | OUTPATIENT
Start: 2024-01-23

## 2024-01-23 RX ORDER — NAPROXEN 500 MG/1
500 TABLET ORAL 2 TIMES DAILY PRN
Qty: 180 TABLET | Refills: 3 | Status: SHIPPED | OUTPATIENT
Start: 2024-01-23

## 2024-01-23 RX ORDER — TIZANIDINE 4 MG/1
4 TABLET ORAL 4 TIMES DAILY
Qty: 360 TABLET | Refills: 1 | Status: SHIPPED | OUTPATIENT
Start: 2024-01-23

## 2024-01-23 RX ORDER — ALBUTEROL SULFATE 90 UG/1
AEROSOL, METERED RESPIRATORY (INHALATION)
Qty: 54 G | Refills: 3 | Status: SHIPPED | OUTPATIENT
Start: 2024-01-23

## 2024-01-23 RX ORDER — AMITRIPTYLINE HYDROCHLORIDE 100 MG/1
100 TABLET ORAL
Qty: 90 TABLET | Refills: 1 | Status: SHIPPED | OUTPATIENT
Start: 2024-01-23

## 2024-01-23 RX ORDER — LEVOTHYROXINE SODIUM 0.05 MG/1
50 TABLET ORAL DAILY
Qty: 90 TABLET | Refills: 1 | Status: SHIPPED | OUTPATIENT
Start: 2024-01-23

## 2024-01-23 NOTE — TELEPHONE ENCOUNTER
"    Caller: Daisy Cesar \"Cristal\"    Relationship: Self    Best call back number: [phone]     Requested Prescriptions:   Requested Prescriptions     Pending Prescriptions Disp Refills    promethazine (PHENERGAN) 25 MG tablet 180 tablet 0     Sig: Take 1 tablet by mouth 2 (Two) Times a Day As Needed for Nausea or Vomiting.    amitriptyline (ELAVIL) 100 MG tablet 90 tablet 1     Sig: Take 1 tablet by mouth every night at bedtime.    omeprazole (priLOSEC) 40 MG capsule 90 capsule 1     Sig: Take 1 capsule by mouth Daily.    dicyclomine (BENTYL) 10 MG capsule 180 capsule 1     Sig: Take 1 capsule by mouth 2 (Two) Times a Day.    levothyroxine (SYNTHROID, LEVOTHROID) 50 MCG tablet 90 tablet 1     Sig: Take 1 tablet by mouth Daily.    tiZANidine (ZANAFLEX) 4 MG tablet 360 tablet 1     Sig: Take 1 tablet by mouth 4 (Four) Times a Day.    naproxen (NAPROSYN) 500 MG tablet 180 tablet 3     Sig: Take 1 tablet by mouth 2 (Two) Times a Day As Needed for Mild Pain. Take with food.    Ventolin  (90 Base) MCG/ACT inhaler 54 g 3        Pharmacy where request should be sent: [facility] [address] [phone] [facility] [phone] FX     Last office visit with prescribing clinician: 11/9/2023   Last telemedicine visit with prescribing clinician: Visit date not found   Next office visit with prescribing clinician: 2/23/2024     Additional details provided by patient: PATIENT IS USING A NEW PHARMACY LISTED ABOVE AND NEEDS ORDERS SENT HERE FOR REFILLS DUE     Does the patient have less than a 3 day supply:  [] Yes  [x] No    Would you like a call back once the refill request has been completed: [] Yes [] No    If the office needs to give you a call back, can they leave a voicemail: [] Yes [] No    Dorie Cuadra, PCT   01/23/24 08:19 EST         "

## 2024-01-24 RX ORDER — LEVOTHYROXINE SODIUM 0.05 MG/1
50 TABLET ORAL DAILY
Qty: 90 TABLET | Refills: 3 | OUTPATIENT
Start: 2024-01-24

## 2024-01-24 RX ORDER — NAPROXEN 500 MG/1
TABLET ORAL
Qty: 180 TABLET | Refills: 3 | OUTPATIENT
Start: 2024-01-24

## 2024-01-30 DIAGNOSIS — K21.9 GASTROESOPHAGEAL REFLUX DISEASE WITHOUT ESOPHAGITIS: ICD-10-CM

## 2024-01-30 RX ORDER — OMEPRAZOLE 40 MG/1
40 CAPSULE, DELAYED RELEASE ORAL DAILY
Qty: 90 CAPSULE | Refills: 3 | OUTPATIENT
Start: 2024-01-30

## 2024-02-23 ENCOUNTER — OFFICE VISIT (OUTPATIENT)
Dept: FAMILY MEDICINE CLINIC | Age: 64
End: 2024-02-23
Payer: MEDICARE

## 2024-02-23 VITALS
SYSTOLIC BLOOD PRESSURE: 111 MMHG | HEART RATE: 90 BPM | DIASTOLIC BLOOD PRESSURE: 78 MMHG | WEIGHT: 215 LBS | HEIGHT: 64 IN | BODY MASS INDEX: 36.7 KG/M2 | OXYGEN SATURATION: 95 %

## 2024-02-23 DIAGNOSIS — B37.31 VAGINAL CANDIDIASIS: ICD-10-CM

## 2024-02-23 DIAGNOSIS — E88.2 LIPOMATOSIS: ICD-10-CM

## 2024-02-23 DIAGNOSIS — G43.119 INTRACTABLE MIGRAINE WITH AURA WITHOUT STATUS MIGRAINOSUS: Primary | ICD-10-CM

## 2024-02-23 DIAGNOSIS — E03.9 HYPOTHYROIDISM (ACQUIRED): ICD-10-CM

## 2024-02-23 DIAGNOSIS — K21.9 GASTROESOPHAGEAL REFLUX DISEASE WITHOUT ESOPHAGITIS: ICD-10-CM

## 2024-02-23 DIAGNOSIS — E78.2 MIXED HYPERLIPIDEMIA: ICD-10-CM

## 2024-02-23 PROBLEM — I10 ESSENTIAL HYPERTENSION: Status: RESOLVED | Noted: 2021-08-04 | Resolved: 2024-02-23

## 2024-02-23 RX ORDER — FLUCONAZOLE 150 MG/1
150 TABLET ORAL ONCE
Qty: 1 TABLET | Refills: 0 | Status: SHIPPED | OUTPATIENT
Start: 2024-02-23 | End: 2024-02-23

## 2024-02-23 RX ORDER — ERYTHROMYCIN 250 MG/1
250 TABLET, DELAYED RELEASE ORAL 2 TIMES DAILY
COMMUNITY
Start: 2024-02-14

## 2024-02-23 RX ORDER — PREGABALIN 75 MG/1
75 CAPSULE ORAL 2 TIMES DAILY
COMMUNITY

## 2024-02-23 RX ORDER — FREMANEZUMAB-VFRM 225 MG/1.5ML
225 INJECTION SUBCUTANEOUS
COMMUNITY
Start: 2024-02-22 | End: 2024-03-23

## 2024-02-23 NOTE — ASSESSMENT & PLAN NOTE
She is scheduled to go for exploration of possible lipomas as above with Dr. Whitt on TBD.  Recent labs done 11/2023 are reviewed and show no abnormalities.    She has undergone surgery with general anesthesia in the past and did not have problems with general anesthesia (except for mild urinary retention immediately following surgery).  She can walk up a flight of stairs without chest pain or shortness of breath.  She does not have personal of blood clots.  Her mother did have blood clots.  Significant medical comorbidities include:  hypothyroidism (well controlled), asthma (mild intermittent, only on albuterol PRN).  Okay to proceed.  Form will be faxed to Dr. Whitt's office.

## 2024-02-23 NOTE — PROGRESS NOTES
Chief Complaint  Migraine (3 month f/u)    Subjective          Daisy Cesar presents to [facility] today for follow-up on chronic issues.    Refractory migraines continue to plague her daily.  She is following with [facility].  She is on Ajovy, gabapentin, amitriptyline, tizanidine, and naproxen, with Migranal for prn use.  No longer on Emgality.  She uses the Migranal only with her worst (frontal) headaches.  Previously, she has used Lyrica (blurry vision), Maxalt and Imitrex (too sedating), naratriptan (elevated BP), zonisamide (abd pain and diarrhea), topiramate (BPPV), Flector patches (insurance has declined).  Diclofenac gel to the back of the neck does give her some relief.  She has previously used occipital nerve blocks but pressure in the back and left side of the head became worse afterwards so she stopped.  She feels as though the lesions in the neck, previously diagnosed as lipomatosis, worsen her headaches.  Dr. [facility] evaluated her and did not believe they were lipomas.  She has had some conflicting opinions on this point, as she saw Dermatology as well, who could not give her a definitive diagnosis but felt that the lesions did seem to be lipomas.  She was seen by a second plastic surgeon, Dr. Brothers, over the summer but he left the system before the workup was completed.  Neurology did complete MRI C spine and brain in August; OV notes indicate they were unremarkable.  She is now following with Pain Management Dr. Benitez for spondylosis and cervical radiculopathy with epidural.  These have unfortunately been ineffective.  RFA has been discussed but not yet implemented.    She has been following with Dr. Whitt for her gastroparesis and he is planning to take her for exploration of some of her skin masses over the left neck, right chest wall and left upper quadrant abdominal wall specifically.  She needs preop clearance for that  today.    She is scheduled to go for exploration of possible lipomas as above with Dr. Whitt on TBD.  Recent labs done 11/2023 are reviewed and show no abnormalities.    She has undergone surgery with general anesthesia in the past and did not have problems with general anesthesia (except for mild urinary retention immediately following surgery).  She can walk up a flight of stairs without chest pain or shortness of breath.  She does not have personal of blood clots.  Her mother did have blood clots.  Significant medical comorbidities include:  hypothyroidism (well controlled), asthma (mild intermittent, only on albuterol PRN).    She has had problems with BPPV in the past, diagnosed by ENT.      She is on levothyroxine for hypothyroidism.  Denies heat/cold intolerance, changes in hair or skin      Unable to tolerate a statin for hyperlipidemia due to myalgias.      She is on omeprazole for GERD.  Nuys heartburn or indigestion.  She is on dicyclomine for IBS and abdominal cramping.  She is following with Dr. Whitt for gastroparesis.  She has tried Reglan but that gave her tremors.  At her last appointment on 2/14/2024, he started on erythromycin.  Status post colonoscopy 9/2023.      Current Outpatient Medications:     acetaminophen (TYLENOL) 500 MG tablet, acetaminophen 500 mg oral tablet take 1 tablet (500 mg) by oral route every 3 hours as needed   Active, Disp: , Rfl:     Ajovy 225 MG/1.5ML solution auto-injector, Inject 225 mg under the skin into the appropriate area as directed., Disp: , Rfl:     amitriptyline (ELAVIL) 100 MG tablet, Take 1 tablet by mouth every night at bedtime., Disp: 90 tablet, Rfl: 1    BIOTIN 5000 PO, Take  by mouth., Disp: , Rfl:     Calcium Carb-Cholecalciferol (Caltrate 600+D3 Soft) 600-800 MG-UNIT chewable tablet, Caltrate 600 + D 600 mg (1,500 mg)-800 unit oral tablet,chewable chew 1 tablet by oral route daily   Active, Disp: , Rfl:     Diclofenac Sodium (VOLTAREN) 1 % gel gel,  Apply  topically to the appropriate area as directed 4 (Four) Times a Day As Needed (neck pain)., Disp: 100 g, Rfl: 0    dicyclomine (BENTYL) 10 MG capsule, Take 1 capsule by mouth 2 (Two) Times a Day., Disp: 180 capsule, Rfl: 1    dihydroergotamine (MIGRANAL) 4 MG/ML nasal spray, Migranal 0.5 mg/pump act. (4 mg/mL) nasal spray,non-aerosol spray 1 spray (0.5 mg) by intranasal route in each nostril, repeat 15 minutes later for a total of 4 sprays (2 mg); do not exceed 6 sprays (3 mg) per day or 8 sprays (4 mg) per week   Active, Disp: , Rfl:     dilTIAZem (CARDIZEM) 30 MG tablet, Take 1 tablet by mouth Every 12 (Twelve) Hours., Disp: , Rfl:     diphenhydrAMINE (BENADRYL) 25 mg capsule, Benadryl 25 mg oral capsule take 2 capsules (50 mg) by oral route once daily at bedtime as needed   Active, Disp: , Rfl:     erythromycin (JAYNA-TAB) 250 MG EC tablet, Take 1 tablet by mouth 2 (Two) Times a Day., Disp: , Rfl:     gabapentin (NEURONTIN) 100 MG capsule, , Disp: , Rfl:     ketoconazole (NIZORAL) 2 % cream, APPLY TO THE AFFECTED AREA(S) TWICE DAILY ON CHEST DURING FLARES, Disp: , Rfl:     levothyroxine (SYNTHROID, LEVOTHROID) 50 MCG tablet, Take 1 tablet by mouth Daily., Disp: 90 tablet, Rfl: 1    metroNIDAZOLE (METROCREAM) 0.75 % cream, APPLY 1 APPLICATION TOPICALLY TO THE APPROPRIATE AREA AS DIRECTED TWICE DAILY, Disp: 90 g, Rfl: 1    naproxen (NAPROSYN) 500 MG tablet, Take 1 tablet by mouth 2 (Two) Times a Day As Needed for Mild Pain. Take with food., Disp: 180 tablet, Rfl: 3    omeprazole (priLOSEC) 40 MG capsule, Take 1 capsule by mouth Daily., Disp: 90 capsule, Rfl: 1    pregabalin (LYRICA) 75 MG capsule, Take 1 capsule by mouth 2 (Two) Times a Day., Disp: , Rfl:     promethazine (PHENERGAN) 25 MG tablet, Take 1 tablet by mouth 2 (Two) Times a Day As Needed for Nausea or Vomiting., Disp: 180 tablet, Rfl: 0    tiZANidine (ZANAFLEX) 4 MG tablet, Take 1 tablet by mouth 4 (Four) Times a Day., Disp: 360 tablet, Rfl: 1     "Ventolin  (90 Base) MCG/ACT inhaler, USE 1 INHALATION BY MOUTH  EVERY 4 HOURS AS NEEDED FOR WHEEZING, Disp: 54 g, Rfl: 3    colestipol (COLESTID) 1 g tablet, Take 1 tablet by mouth., Disp: , Rfl:     fluconazole (Diflucan) 150 MG tablet, Take 1 tablet by mouth 1 (One) Time for 1 dose., Disp: 1 tablet, Rfl: 0    Allergies:  Morphine and Penicillins      Objective   Vital Signs:   Vitals:    02/23/24 1013   BP: 111/78   BP Location: Left arm   Patient Position: Sitting   Cuff Size: Large Adult   Pulse: 90   SpO2: 95%   Weight: 97.5 kg (215 lb)   Height: 162.6 cm (64.02\")         Physical Exam  Vitals reviewed.   Constitutional:       General: She is not in acute distress.     Appearance: Normal appearance. She is well-developed.   HENT:      Head: Normocephalic and atraumatic.      Right Ear: External ear normal.      Left Ear: External ear normal.      Nose: Nose normal.      Mouth/Throat:      Mouth: Mucous membranes are moist.   Eyes:      Extraocular Movements: Extraocular movements intact.      Conjunctiva/sclera: Conjunctivae normal.      Pupils: Pupils are equal, round, and reactive to light.   Cardiovascular:      Rate and Rhythm: Normal rate and regular rhythm.      Heart sounds: No murmur heard.  Pulmonary:      Effort: Pulmonary effort is normal.      Breath sounds: Normal breath sounds. No wheezing, rhonchi or rales.   Abdominal:      General: Bowel sounds are normal. There is no distension.      Palpations: Abdomen is soft.      Tenderness: There is no abdominal tenderness.   Musculoskeletal:         General: Normal range of motion.   Skin:     General: Skin is warm and dry.      Comments: Firm rubbery nodules found diffusely over the trunk and neck   Neurological:      Mental Status: She is alert.   Psychiatric:         Mood and Affect: Affect normal.          Lab Results   Component Value Date    GLUCOSE 99 11/09/2023    BUN 12 11/09/2023    CREATININE 0.81 11/09/2023    BCR 14.8 11/09/2023    K " 4.1 11/09/2023    CO2 24.7 11/09/2023    CALCIUM 9.3 11/09/2023    ALBUMIN 4.2 11/09/2023    LABIL2 1.4 03/31/2021    AST 11 11/09/2023    ALT 12 11/09/2023       Lab Results   Component Value Date    CHOL 262 (H) 11/09/2023    CHLPL 245 (H) 03/31/2021    TRIG 245 (H) 11/09/2023    HDL 57 11/09/2023     (H) 11/09/2023     Lab Results   Component Value Date    WBC 7.12 11/09/2023    HGB 12.5 11/09/2023    HCT 38.6 11/09/2023    MCV 86.4 11/09/2023     11/09/2023            Assessment and Plan    Assessment & Plan  Intractable migraine with aura without status migrainosus    Following with [facility].  On Ajovy, amitriptyline, Migranal, gabapentin, naproxen, tizanidine and Phenergan.  No refills needed today.  Continue to monitor.     Hypothyroidism (acquired)  Stable on levothyroxine 50 mcg daily.  Refills were not needed today.  Labs were not due today.  Continue to monitor.    Mixed hyperlipidemia  Unable to tolerate statins due to myalgias.           Lipomatosis  She is scheduled to go for exploration of possible lipomas as above with Dr. Whitt on D.  Recent labs done 11/2023 are reviewed and show no abnormalities.    She has undergone surgery with general anesthesia in the past and did not have problems with general anesthesia (except for mild urinary retention immediately following surgery).  She can walk up a flight of stairs without chest pain or shortness of breath.  She does not have personal of blood clots.  Her mother did have blood clots.  Significant medical comorbidities include:  hypothyroidism (well controlled), asthma (mild intermittent, only on albuterol PRN).  Okay to proceed.  Form will be faxed to Dr. Whitt's office.  Gastroesophageal reflux disease without esophagitis  Stable on omeprazole 40 mg daily.  Refills were not needed today.  Continue to monitor.  Wean PPI as able.    Vaginal candidiasis  Diflucan prescription written as below.  Hold erythromycin 2 days before  and 2 days after.     New Medications Ordered This Visit   Medications    fluconazole (Diflucan) 150 MG tablet     Sig: Take 1 tablet by mouth 1 (One) Time for 1 dose.     Dispense:  1 tablet     Refill:  0             Follow Up   No follow-ups on file.  Patient was given instructions and counseling regarding her condition or for health maintenance advice. Please see specific information pulled into the AVS if appropriate.

## 2024-02-23 NOTE — ASSESSMENT & PLAN NOTE
Following with [facility].  On Ajovy, amitriptyline, Migranal, gabapentin, naproxen, tizanidine and Phenergan.  No refills needed today.  Continue to monitor.

## 2024-03-15 ENCOUNTER — TELEPHONE (OUTPATIENT)
Dept: FAMILY MEDICINE CLINIC | Age: 64
End: 2024-03-15
Payer: MEDICARE

## 2024-03-15 DIAGNOSIS — G43.119 INTRACTABLE MIGRAINE WITH AURA WITHOUT STATUS MIGRAINOSUS: ICD-10-CM

## 2024-03-15 NOTE — TELEPHONE ENCOUNTER
"     Caller: Daisy Cesar \"Cristal\"    Relationship: Self    Best call back number: [phone]    Requested Prescriptions:   Requested Prescriptions      No prescriptions requested or ordered in this encounter        Diclofenac Sodium (VOLTAREN) 1 % gel gel       Pharmacy where request should be sent: [facility] [address] [place] [facility] [phone] [facility] [phone] [facility]     Last office visit with prescribing clinician: 2/23/2024   Last telemedicine visit with prescribing clinician: Visit date not found   Next office visit with prescribing clinician: 5/23/2024     Additional details provided by patient:      THE PATIENT IS WANTING A 3 MONTH SUPPLY     Does the patient have less than a 3 day supply:  [x] Yes  [] No    Would you like a call back once the refill request has been completed: [] Yes [x] No    If the office needs to give you a call back, can they leave a voicemail: [] Yes [x] No    Brando Reeves   03/15/24 10:23 EDT          "

## 2024-04-30 DIAGNOSIS — G43.119 INTRACTABLE MIGRAINE WITH AURA WITHOUT STATUS MIGRAINOSUS: ICD-10-CM

## 2024-04-30 RX ORDER — PROMETHAZINE HYDROCHLORIDE 25 MG/1
TABLET ORAL
Qty: 180 TABLET | Refills: 0 | Status: SHIPPED | OUTPATIENT
Start: 2024-04-30

## 2024-07-05 ENCOUNTER — OFFICE VISIT (OUTPATIENT)
Dept: FAMILY MEDICINE CLINIC | Age: 64
End: 2024-07-05
Payer: MEDICARE

## 2024-07-05 VITALS
BODY MASS INDEX: 36.74 KG/M2 | TEMPERATURE: 98.7 F | HEIGHT: 64 IN | WEIGHT: 215.2 LBS | HEART RATE: 90 BPM | DIASTOLIC BLOOD PRESSURE: 83 MMHG | OXYGEN SATURATION: 93 % | SYSTOLIC BLOOD PRESSURE: 131 MMHG

## 2024-07-05 DIAGNOSIS — E78.2 MIXED HYPERLIPIDEMIA: ICD-10-CM

## 2024-07-05 DIAGNOSIS — K58.2 IRRITABLE BOWEL SYNDROME WITH BOTH CONSTIPATION AND DIARRHEA: ICD-10-CM

## 2024-07-05 DIAGNOSIS — E03.9 HYPOTHYROIDISM (ACQUIRED): ICD-10-CM

## 2024-07-05 DIAGNOSIS — Z00.00 ANNUAL PHYSICAL EXAM: Primary | ICD-10-CM

## 2024-07-05 DIAGNOSIS — K21.9 GASTROESOPHAGEAL REFLUX DISEASE WITHOUT ESOPHAGITIS: ICD-10-CM

## 2024-07-05 DIAGNOSIS — G43.119 INTRACTABLE MIGRAINE WITH AURA WITHOUT STATUS MIGRAINOSUS: ICD-10-CM

## 2024-07-05 DIAGNOSIS — H92.01 RIGHT EAR PAIN: ICD-10-CM

## 2024-07-05 DIAGNOSIS — E88.2 LIPOMATOSIS: ICD-10-CM

## 2024-07-05 PROCEDURE — 1160F RVW MEDS BY RX/DR IN RCRD: CPT | Performed by: FAMILY MEDICINE

## 2024-07-05 PROCEDURE — G0439 PPPS, SUBSEQ VISIT: HCPCS | Performed by: FAMILY MEDICINE

## 2024-07-05 PROCEDURE — 1125F AMNT PAIN NOTED PAIN PRSNT: CPT | Performed by: FAMILY MEDICINE

## 2024-07-05 PROCEDURE — 1159F MED LIST DOCD IN RCRD: CPT | Performed by: FAMILY MEDICINE

## 2024-07-05 PROCEDURE — 1170F FXNL STATUS ASSESSED: CPT | Performed by: FAMILY MEDICINE

## 2024-07-05 PROCEDURE — 99214 OFFICE O/P EST MOD 30 MIN: CPT | Performed by: FAMILY MEDICINE

## 2024-07-05 RX ORDER — DICYCLOMINE HYDROCHLORIDE 10 MG/1
10 CAPSULE ORAL 2 TIMES DAILY
Qty: 180 CAPSULE | Refills: 1 | Status: SHIPPED | OUTPATIENT
Start: 2024-07-05

## 2024-07-05 RX ORDER — FLUTICASONE PROPIONATE 50 MCG
2 SPRAY, SUSPENSION (ML) NASAL DAILY
Qty: 16 G | Refills: 5 | Status: SHIPPED | OUTPATIENT
Start: 2024-07-05 | End: 2025-07-05

## 2024-07-05 RX ORDER — FREMANEZUMAB-VFRM 225 MG/1.5ML
INJECTION SUBCUTANEOUS
COMMUNITY

## 2024-07-05 RX ORDER — OMEPRAZOLE 40 MG/1
40 CAPSULE, DELAYED RELEASE ORAL DAILY
Qty: 90 CAPSULE | Refills: 1 | Status: SHIPPED | OUTPATIENT
Start: 2024-07-05

## 2024-07-05 RX ORDER — LEVOTHYROXINE SODIUM 0.05 MG/1
50 TABLET ORAL DAILY
Qty: 90 TABLET | Refills: 1 | Status: SHIPPED | OUTPATIENT
Start: 2024-07-05

## 2024-07-05 RX ORDER — TIZANIDINE 4 MG/1
4 TABLET ORAL 4 TIMES DAILY
Qty: 360 TABLET | Refills: 1 | Status: SHIPPED | OUTPATIENT
Start: 2024-07-05

## 2024-07-05 RX ORDER — AMITRIPTYLINE HYDROCHLORIDE 100 MG/1
100 TABLET ORAL
Qty: 90 TABLET | Refills: 1 | Status: SHIPPED | OUTPATIENT
Start: 2024-07-05

## 2024-07-05 NOTE — ASSESSMENT & PLAN NOTE
Stable on levothyroxine 50 mcg daily.  Refills were needed today.  Labs were due today.  Continue to monitor.

## 2024-07-05 NOTE — PROGRESS NOTES
The ABCs of the Annual Wellness Visit  Subsequent Medicare Wellness Visit    Bay Cesar is a 64 y.o. female who presents for a Subsequent Medicare Wellness Visit.    She is UTD on colonoscopy, last done 9/2023 and this was normal.  Ten-year repeat recommended.  Pap smear is no longer indicated by history; s/p hysterectomy.  She is UTD on mammogram, last done 12/2023 and this was normal.  She is UTD on DEXA, last done 10/2022 and this showed osteopenia.  She is up-to-date on Td (8/2021).  She is due for COVID, Shingrix, RSV and [id number].  Flu shot is not currently indicated. She is due for including HTN panel, CBC and TSH.     The following portions of the patient's history were reviewed and   updated as appropriate: allergies, current medications, past family history, past medical history, past social history, past surgical history, and problem list.    Compared to one year ago, the patient feels her physical   health is worse.    Compared to one year ago, the patient feels her mental   health is the same.    Recent Hospitalizations:  She was not admitted to the hospital during the last year.       Current Medical Providers:  Patient Care Team:  Elise Cueto MD as PCP - General (Family Medicine)    Outpatient Medications Prior to Visit   Medication Sig Dispense Refill    acetaminophen (TYLENOL) 500 MG tablet acetaminophen 500 mg oral tablet take 1 tablet (500 mg) by oral route every 3 hours as needed   Active      Ajovy 225 MG/1.5ML solution auto-injector inject 1.5ml under the skin EVERY 30 DAYS      BIOTIN 5000 PO Take  by mouth Daily.      Calcium Carb-Cholecalciferol (Caltrate 600+D3 Soft) 600-800 MG-UNIT chewable tablet Caltrate 600 + D 600 mg (1,500 mg)-800 unit oral tablet,chewable chew 1 tablet by oral route daily   Active      colestipol (COLESTID) 1 g tablet Take 1 tablet by mouth.      Diclofenac Sodium (VOLTAREN) 1 % gel gel Apply  topically to the appropriate area as  directed 4 (Four) Times a Day As Needed (neck pain). 100 g 5    dihydroergotamine (MIGRANAL) 4 MG/ML nasal spray Migranal 0.5 mg/pump act. (4 mg/mL) nasal spray,non-aerosol spray 1 spray (0.5 mg) by intranasal route in each nostril, repeat 15 minutes later for a total of 4 sprays (2 mg); do not exceed 6 sprays (3 mg) per day or 8 sprays (4 mg) per week   Active      dilTIAZem (CARDIZEM) 30 MG tablet Take 1 tablet by mouth Every 12 (Twelve) Hours.      diphenhydrAMINE (BENADRYL) 25 mg capsule Benadryl 25 mg oral capsule take 2 capsules (50 mg) by oral route once daily at bedtime as needed   Active      ketoconazole (NIZORAL) 2 % cream APPLY TO THE AFFECTED AREA(S) TWICE DAILY ON CHEST DURING FLARES      metroNIDAZOLE (METROCREAM) 0.75 % cream APPLY 1 APPLICATION TOPICALLY TO THE APPROPRIATE AREA AS DIRECTED TWICE DAILY 90 g 1    naproxen (NAPROSYN) 500 MG tablet Take 1 tablet by mouth 2 (Two) Times a Day As Needed for Mild Pain. Take with food. 180 tablet 3    pregabalin (LYRICA) 75 MG capsule Take 1 capsule by mouth 2 (Two) Times a Day.      promethazine (PHENERGAN) 25 MG tablet TAKE 1 TABLET BY MOUTH TWICE DAILY AS NEEDED FOR NAUSEA AND VOMITING 180 tablet 0    Ventolin  (90 Base) MCG/ACT inhaler USE 1 INHALATION BY MOUTH  EVERY 4 HOURS AS NEEDED FOR WHEEZING 54 g 3    amitriptyline (ELAVIL) 100 MG tablet Take 1 tablet by mouth every night at bedtime. 90 tablet 1    dicyclomine (BENTYL) 10 MG capsule Take 1 capsule by mouth 2 (Two) Times a Day. 180 capsule 1    levothyroxine (SYNTHROID, LEVOTHROID) 50 MCG tablet Take 1 tablet by mouth Daily. 90 tablet 1    omeprazole (priLOSEC) 40 MG capsule Take 1 capsule by mouth Daily. 90 capsule 1    tiZANidine (ZANAFLEX) 4 MG tablet Take 1 tablet by mouth 4 (Four) Times a Day. 360 tablet 1    linaclotide (Linzess) 72 MCG capsule capsule Take 1 capsule by mouth Every Morning Before Breakfast.      erythromycin (JAYNA-TAB) 250 MG EC tablet Take 1 tablet by mouth 2 (Two)  "Times a Day. (Patient not taking: Reported on 7/5/2024)      gabapentin (NEURONTIN) 100 MG capsule  (Patient not taking: Reported on 7/5/2024)       No facility-administered medications prior to visit.       No opioid medication identified on active medication list. I have reviewed chart for other potential  high risk medication/s and harmful drug interactions in the elderly.        Aspirin is not on active medication list.  Aspirin use is not indicated based on review of current medical condition/s. Risk of harm outweighs potential benefits.  .    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  ACP discussion was held with the patient during this visit. Patient does not have an advance directive, information provided.     Objective    Vitals:    07/05/24 0836   BP: 131/83   BP Location: Left arm   Patient Position: Sitting   Cuff Size: Large Adult   Pulse: 90   Temp: 98.7 °F (37.1 °C)   TempSrc: Oral   SpO2: 93%   Weight: 97.6 kg (215 lb 3.2 oz)   Height: 162.6 cm (64.02\")     Estimated body mass index is 36.92 kg/m² as calculated from the following:    Height as of this encounter: 162.6 cm (64.02\").    Weight as of this encounter: 97.6 kg (215 lb 3.2 oz).    Class 2 Severe Obesity (BMI >=35 and <=39.9). Obesity-related health conditions include the following: dyslipidemias and GERD. Obesity is unchanged. " BMI is is above average; BMI management plan is completed. We discussed portion control and increasing exercise.      Does the patient have evidence of cognitive impairment? No          HEALTH RISK ASSESSMENT    Smoking Status:  Social History     Tobacco Use   Smoking Status Never    Passive exposure: Never   Smokeless Tobacco Never     Alcohol Consumption:  Social History     Substance and Sexual Activity   Alcohol Use None     Fall Risk Screen:    STEADI Fall Risk Assessment was completed, and patient is at LOW risk for falls.Assessment completed on:2024    Depression Screenin/5/2024     8:40 AM   PHQ-2/PHQ-9 Depression Screening   Little Interest or Pleasure in Doing Things 0-->not at all   Feeling Down, Depressed or Hopeless 0-->not at all   PHQ-9: Brief Depression Severity Measure Score 0       Health Habits and Functional and Cognitive Screenin/5/2024     8:40 AM   Functional & Cognitive Status   Do you have difficulty preparing food and eating? No   Do you have difficulty bathing yourself, getting dressed or grooming yourself? No   Do you have difficulty using the toilet? No   Do you have difficulty moving around from place to place? No   Do you have trouble with steps or getting out of a bed or a chair? No   Current Diet Unhealthy Diet   Dental Exam Not up to date   Eye Exam Up to date   Exercise (times per week) 3 times per week   Current Exercises Include Walking   Do you need help using the phone?  No   Are you deaf or do you have serious difficulty hearing?  No   Do you need help to go to places out of walking distance? No   Do you need help shopping? No   Do you need help preparing meals?  No   Do you need help with housework?  No   Do you need help with laundry? No   Do you need help taking your medications? No   Do you need help managing money? No   Do you ever drive or ride in a car without wearing a seat belt? No   Who do you live with? Alone   If you need help, do you have  trouble finding someone available to you? Yes   Have you been bothered in the last four weeks by sexual problems? No   Do you have difficulty concentrating, remembering or making decisions? No       Age-appropriate Screening Schedule:  Refer to the list below for future screening recommendations based on patient's age, sex and/or medical conditions. Orders for these recommended tests are listed in the plan section. The patient has been provided with a written plan.    Health Maintenance   Topic Date Due    ZOSTER VACCINE (1 of 2) 07/05/2024 (Originally 1/9/2009)    COVID-19 Vaccine (1 - 2023-24 season) 07/07/2024 (Originally 9/1/2023)    Pneumococcal Vaccine 65+ (1 of 2 - PCV) 02/23/2025 (Originally 1/9/1966)    RSV Vaccine - Adults (1 - 1-dose 60+ series) 07/05/2025 (Originally 1/9/2019)    INFLUENZA VACCINE  08/01/2024    DXA SCAN  10/31/2024    LIPID PANEL  11/09/2024    ANNUAL WELLNESS VISIT  07/05/2025    BMI FOLLOWUP  07/05/2025    MAMMOGRAM  12/13/2025    TDAP/TD VACCINES (2 - Tdap) 08/04/2031    COLORECTAL CANCER SCREENING  09/22/2033    HEPATITIS C SCREENING  Completed                  CMS Preventative Services Quick Reference  Risk Factors Identified During Encounter  Immunizations Discussed/Encouraged: Prevnar 20 (Pneumococcal 20-valent conjugate), Shingrix, COVID19, and RSV (Respiratory Syncytial Virus)  The above risks/problems have been discussed with the patient.  Pertinent information has been shared with the patient in the After Visit Summary.  An After Visit Summary and PPPS were made available to the patient.    Follow Up:   Next Medicare Wellness visit to be scheduled in 1 year.       Additional E&M Note during same encounter follows:  Patient has multiple medical problems which are significant and separately identifiable that require additional work above and beyond the Medicare Wellness Visit.      Chief Complaint  Medicare Wellness-subsequent    Subjective        HPI  Daisy Cesar is also  being seen today for routine f/u of chronic issues.     She continues to have refractory migraines on a daily basis.  Follows with [facility].  She is on Ajovy, gabapentin, diltiazem, amitriptyline, tizanidine, and naproxen, with prn Migranal.  She uses the Migranal only with her worst (frontal) headaches.  Previously, she has used  Emgality (constipation), Lyrica (blurry vision), Maxalt and Imitrex (too sedating), naratriptan (elevated BP), zonisamide (abd pain and diarrhea), topiramate (BPPV), Flector patches (insurance has declined).  Diclofenac gel to the back of the neck does give her some relief.  No longer doing occipital nerve blocks.  She feels as though the lesions in the neck, previously diagnosed as lipomatosis, worsen her headaches.  Dr. [facility] evaluated her and did not believe they were lipomas.  She has had some conflicting opinions on this point, as she saw Dermatology as well, who could not give her a definitive diagnosis but felt that the lesions did seem to be lipomas.  She was seen by a second plastic surgeon, Dr. rBothers, over the summer but he left the system before the workup was completed.  Neurology did complete MRI C spine and brain in August; OV notes indicate they were unremarkable.  She is now following with Pain Management Dr. Benitez for spondylosis and cervical radiculopathy with epidural.  These have unfortunately been ineffective.  RFA has been discussed but not yet implemented.  She did have some of the lesions removed by Dr. Whitt a few months ago and that does feel better.     She has had problems with BPPV in the past, diagnosed by ENT.      She is on levothyroxine for hypothyroidism.  No heat/cold intolerance, no changes in hair or skin      Unable to tolerate a statin for hyperlipidemia due to myalgias.      She is on omeprazole for GERD.  No reflux or heartburn.  She is on dicyclomine for IBS and abdominal cramping.  Follows with Dr. Whitt.  She has  "tried Reglan but that gave her tremors.  At her last appointment on 2/14/2024, he started on erythromycin.  Status post colonoscopy 9/2023.  She was trialed on Linzess by Dr. Whitt for chronic constipation.  That has helped a little bit but still having problems.  She has tried and failed erythromycin and metoclopramide for gastroparesis.    Review of Systems   Constitutional:  Positive for fatigue. Negative for chills and fever.   HENT:  Positive for ear pain (R). Negative for congestion, hearing loss and rhinorrhea.    Eyes:  Negative for pain and visual disturbance.   Respiratory:  Negative for cough and shortness of breath.    Cardiovascular:  Negative for chest pain and palpitations.   Gastrointestinal:  Negative for abdominal pain, constipation, diarrhea, nausea and vomiting.        Positive for reflux   Genitourinary:  Negative for difficulty urinating and dysuria.   Musculoskeletal:  Positive for arthralgias, back pain, myalgias and neck pain.   Neurological:  Negative for weakness and numbness.   Psychiatric/Behavioral:  Negative for dysphoric mood and sleep disturbance. The patient is not nervous/anxious.        Objective   Vital Signs:  /83 (BP Location: Left arm, Patient Position: Sitting, Cuff Size: Large Adult)   Pulse 90   Temp 98.7 °F (37.1 °C) (Oral)   Ht 162.6 cm (64.02\")   Wt 97.6 kg (215 lb 3.2 oz)   SpO2 93%   BMI 36.92 kg/m²     Physical Exam  Vitals reviewed.   Constitutional:       General: She is not in acute distress.     Appearance: Normal appearance. She is well-developed.   HENT:      Head: Normocephalic and atraumatic.      Right Ear: External ear normal. A middle ear effusion is present. Tympanic membrane is not erythematous, retracted or bulging.      Left Ear: External ear normal.  No middle ear effusion. Tympanic membrane is not erythematous, retracted or bulging.      Nose: Nose normal.      Mouth/Throat:      Mouth: Mucous membranes are moist.   Eyes:      Extraocular " Movements: Extraocular movements intact.      Conjunctiva/sclera: Conjunctivae normal.      Pupils: Pupils are equal, round, and reactive to light.   Cardiovascular:      Rate and Rhythm: Normal rate and regular rhythm.      Heart sounds: No murmur heard.  Pulmonary:      Effort: Pulmonary effort is normal.      Breath sounds: Normal breath sounds. No wheezing, rhonchi or rales.   Abdominal:      General: Bowel sounds are normal. There is no distension.      Palpations: Abdomen is soft.      Tenderness: There is no abdominal tenderness.   Musculoskeletal:         General: Normal range of motion.   Skin:     General: Skin is warm and dry.      Comments: Firm rubbery nodules found diffusely over the trunk and neck   Neurological:      Mental Status: She is alert and oriented to person, place, and time.      Deep Tendon Reflexes: Reflexes normal.   Psychiatric:         Mood and Affect: Mood and affect normal.         Behavior: Behavior normal.         Thought Content: Thought content normal.         Judgment: Judgment normal.                    Lab Results   Component Value Date    GLUCOSE 99 11/09/2023    BUN 12 11/09/2023    CREATININE 0.81 11/09/2023    EGFR 81.2 11/09/2023    BCR 14.8 11/09/2023    K 4.1 11/09/2023    CO2 24.7 11/09/2023    CALCIUM 9.3 11/09/2023    ALBUMIN 4.2 11/09/2023    BILITOT <0.2 11/09/2023    AST 11 11/09/2023    ALT 12 11/09/2023       Lab Results   Component Value Date    CHOL 262 (H) 11/09/2023    CHLPL 245 (H) 03/31/2021    TRIG 245 (H) 11/09/2023    HDL 57 11/09/2023     (H) 11/09/2023       Lab Results   Component Value Date    WBC 7.12 11/09/2023    HGB 12.5 11/09/2023    HCT 38.6 11/09/2023    MCV 86.4 11/09/2023     11/09/2023        Assessment and Plan   Assessment & Plan  Annual physical exam  She is UTD on colonoscopy, last done 9/2023 and this was normal.  Ten-year repeat recommended.  Pap smear is no longer indicated by history; s/p hysterectomy.  She is UTD on  mammogram, last done 12/2023 and this was normal.  She is UTD on DEXA, last done 10/2022 and this showed osteopenia.  She is up-to-date on Td (8/2021).  She is due for COVID, Shingrix, RSV and [id number]; declines all today.  Flu shot is not currently indicated. She is due for including HTN panel, CBC and TSH; ordered.   Right ear pain  +Serous tympanic effusion R-sided only.  Start daily Flonase.  Rx sent.  Mixed hyperlipidemia     Intolerant of statins.  Checking labs.  Hypothyroidism (acquired)  Stable on levothyroxine 50 mcg daily.  Refills were needed today.  Labs were due today.  Continue to monitor.   Gastroesophageal reflux disease without esophagitis  Stable on omeprazole 40 mg daily.  Refills were needed today.  Continue to monitor.  Wean PPI as able.   Intractable migraine with aura without status migrainosus    Following with [facility].  Stable on Ajovy, gabapentin, diltiazem, amitriptyline, tizanidine, and naproxen, with prn Migranal.  No refills needed.  Refills sent today as below.  Irritable bowel syndrome with both constipation and diarrhea  Now on Linzess and doing better somewhat with that.  Lipomatosis  Confirmed by pathology with recent excisions by Dr. Whitt    Orders Placed This Encounter   Procedures    Lipid Panel    Comprehensive Metabolic Panel    CBC Auto Differential    TSH     New Medications Ordered This Visit   Medications    omeprazole (priLOSEC) 40 MG capsule     Sig: Take 1 capsule by mouth Daily.     Dispense:  90 capsule     Refill:  1     Please send a replace/new response with 90-Day Supply if appropriate to maximize member benefit. Requesting 1 year supply.    amitriptyline (ELAVIL) 100 MG tablet     Sig: Take 1 tablet by mouth every night at bedtime.     Dispense:  90 tablet     Refill:  1     Please send a replace/new response with 90-Day Supply if appropriate to maximize member benefit. Requesting 1 year supply.    dicyclomine (BENTYL) 10 MG capsule     Sig: Take  1 capsule by mouth 2 (Two) Times a Day.     Dispense:  180 capsule     Refill:  1     Please send a replace/new response with 90-Day Supply if appropriate to maximize member benefit. Requesting 1 year supply.    levothyroxine (SYNTHROID, LEVOTHROID) 50 MCG tablet     Sig: Take 1 tablet by mouth Daily.     Dispense:  90 tablet     Refill:  1     Please send a replace/new response with 90-Day Supply if appropriate to maximize member benefit. Requesting 1 year supply.    tiZANidine (ZANAFLEX) 4 MG tablet     Sig: Take 1 tablet by mouth 4 (Four) Times a Day.     Dispense:  360 tablet     Refill:  1     Please send a replace/new response with 90-Day Supply if appropriate to maximize member benefit. Requesting 1 year supply.    fluticasone (FLONASE) 50 MCG/ACT nasal spray     Si sprays into the nostril(s) as directed by provider Daily.     Dispense:  16 g     Refill:  5            Follow Up   Return in about 3 months (around 10/5/2024) for Recheck.  Patient was given instructions and counseling regarding her condition or for health maintenance advice. Please see specific information pulled into the AVS if appropriate.

## 2024-07-05 NOTE — ASSESSMENT & PLAN NOTE
Following with [facility].  Stable on Ajovy, gabapentin, diltiazem, amitriptyline, tizanidine, and naproxen, with prn Migranal.  No refills needed.  Refills sent today as below.

## 2024-07-05 NOTE — ASSESSMENT & PLAN NOTE
Stable on omeprazole 40 mg daily.  Refills were needed today.  Continue to monitor.  Wean PPI as able.

## 2024-07-18 ENCOUNTER — LAB (OUTPATIENT)
Dept: LAB | Facility: HOSPITAL | Age: 64
End: 2024-07-18
Payer: MEDICARE

## 2024-07-18 DIAGNOSIS — E03.9 HYPOTHYROIDISM (ACQUIRED): ICD-10-CM

## 2024-07-18 DIAGNOSIS — E78.2 MIXED HYPERLIPIDEMIA: ICD-10-CM

## 2024-07-18 LAB
ALBUMIN SERPL-MCNC: 4.4 G/DL (ref 3.5–5.2)
ALBUMIN/GLOB SERPL: 1.4 G/DL
ALP SERPL-CCNC: 108 U/L (ref 39–117)
ALT SERPL W P-5'-P-CCNC: 12 U/L (ref 1–33)
ANION GAP SERPL CALCULATED.3IONS-SCNC: 12 MMOL/L (ref 5–15)
AST SERPL-CCNC: 20 U/L (ref 1–32)
BASOPHILS # BLD AUTO: 0.03 10*3/MM3 (ref 0–0.2)
BASOPHILS NFR BLD AUTO: 0.4 % (ref 0–1.5)
BILIRUB SERPL-MCNC: 0.3 MG/DL (ref 0–1.2)
BUN SERPL-MCNC: 14 MG/DL (ref 8–23)
BUN/CREAT SERPL: 16.1 (ref 7–25)
CALCIUM SPEC-SCNC: 9.5 MG/DL (ref 8.6–10.5)
CHLORIDE SERPL-SCNC: 104 MMOL/L (ref 98–107)
CHOLEST SERPL-MCNC: 269 MG/DL (ref 0–200)
CO2 SERPL-SCNC: 25 MMOL/L (ref 22–29)
CREAT SERPL-MCNC: 0.87 MG/DL (ref 0.57–1)
DEPRECATED RDW RBC AUTO: 46 FL (ref 37–54)
EGFRCR SERPLBLD CKD-EPI 2021: 74 ML/MIN/1.73
EOSINOPHIL # BLD AUTO: 0.16 10*3/MM3 (ref 0–0.4)
EOSINOPHIL NFR BLD AUTO: 2.2 % (ref 0.3–6.2)
ERYTHROCYTE [DISTWIDTH] IN BLOOD BY AUTOMATED COUNT: 14.2 % (ref 12.3–15.4)
GLOBULIN UR ELPH-MCNC: 3.1 GM/DL
GLUCOSE SERPL-MCNC: 119 MG/DL (ref 65–99)
HCT VFR BLD AUTO: 42.8 % (ref 34–46.6)
HDLC SERPL-MCNC: 58 MG/DL (ref 40–60)
HGB BLD-MCNC: 13.7 G/DL (ref 12–15.9)
IMM GRANULOCYTES # BLD AUTO: 0.02 10*3/MM3 (ref 0–0.05)
IMM GRANULOCYTES NFR BLD AUTO: 0.3 % (ref 0–0.5)
LDLC SERPL CALC-MCNC: 174 MG/DL (ref 0–100)
LDLC/HDLC SERPL: 2.95 {RATIO}
LYMPHOCYTES # BLD AUTO: 1.93 10*3/MM3 (ref 0.7–3.1)
LYMPHOCYTES NFR BLD AUTO: 26.3 % (ref 19.6–45.3)
MCH RBC QN AUTO: 28.2 PG (ref 26.6–33)
MCHC RBC AUTO-ENTMCNC: 32 G/DL (ref 31.5–35.7)
MCV RBC AUTO: 88.2 FL (ref 79–97)
MONOCYTES # BLD AUTO: 0.38 10*3/MM3 (ref 0.1–0.9)
MONOCYTES NFR BLD AUTO: 5.2 % (ref 5–12)
NEUTROPHILS NFR BLD AUTO: 4.82 10*3/MM3 (ref 1.7–7)
NEUTROPHILS NFR BLD AUTO: 65.6 % (ref 42.7–76)
PLATELET # BLD AUTO: 275 10*3/MM3 (ref 140–450)
PMV BLD AUTO: 8.9 FL (ref 6–12)
POTASSIUM SERPL-SCNC: 3.8 MMOL/L (ref 3.5–5.2)
PROT SERPL-MCNC: 7.5 G/DL (ref 6–8.5)
RBC # BLD AUTO: 4.85 10*6/MM3 (ref 3.77–5.28)
SODIUM SERPL-SCNC: 141 MMOL/L (ref 136–145)
TRIGL SERPL-MCNC: 200 MG/DL (ref 0–150)
TSH SERPL DL<=0.05 MIU/L-ACNC: 1.4 UIU/ML (ref 0.27–4.2)
VLDLC SERPL-MCNC: 37 MG/DL (ref 5–40)
WBC NRBC COR # BLD AUTO: 7.34 10*3/MM3 (ref 3.4–10.8)

## 2024-07-18 PROCEDURE — 85025 COMPLETE CBC W/AUTO DIFF WBC: CPT

## 2024-07-18 PROCEDURE — 80053 COMPREHEN METABOLIC PANEL: CPT

## 2024-07-18 PROCEDURE — 84443 ASSAY THYROID STIM HORMONE: CPT

## 2024-07-18 PROCEDURE — 80061 LIPID PANEL: CPT

## 2024-07-18 PROCEDURE — 36415 COLL VENOUS BLD VENIPUNCTURE: CPT

## 2024-08-14 DIAGNOSIS — G43.119 INTRACTABLE MIGRAINE WITH AURA WITHOUT STATUS MIGRAINOSUS: ICD-10-CM

## 2024-08-14 RX ORDER — PROMETHAZINE HYDROCHLORIDE 25 MG/1
TABLET ORAL
Qty: 180 TABLET | Refills: 0 | Status: SHIPPED | OUTPATIENT
Start: 2024-08-14

## 2024-08-27 ENCOUNTER — OFFICE VISIT (OUTPATIENT)
Dept: FAMILY MEDICINE CLINIC | Age: 64
End: 2024-08-27
Payer: MEDICARE

## 2024-08-27 VITALS
HEART RATE: 93 BPM | OXYGEN SATURATION: 96 % | SYSTOLIC BLOOD PRESSURE: 120 MMHG | BODY MASS INDEX: 36.92 KG/M2 | HEIGHT: 64 IN | DIASTOLIC BLOOD PRESSURE: 78 MMHG | TEMPERATURE: 98.4 F

## 2024-08-27 DIAGNOSIS — N39.0 URINARY TRACT INFECTION IN FEMALE: Primary | ICD-10-CM

## 2024-08-27 DIAGNOSIS — R09.81 SINUS CONGESTION: ICD-10-CM

## 2024-08-27 DIAGNOSIS — R30.0 DYSURIA: ICD-10-CM

## 2024-08-27 DIAGNOSIS — H69.91 DYSFUNCTION OF RIGHT EUSTACHIAN TUBE: ICD-10-CM

## 2024-08-27 LAB
BILIRUB BLD-MCNC: NEGATIVE MG/DL
CLARITY, POC: CLEAR
COLOR UR: YELLOW
EXPIRATION DATE: ABNORMAL
GLUCOSE UR STRIP-MCNC: NEGATIVE MG/DL
KETONES UR QL: NEGATIVE
LEUKOCYTE EST, POC: ABNORMAL
Lab: ABNORMAL
NITRITE UR-MCNC: POSITIVE MG/ML
PH UR: 7 [PH] (ref 5–8)
PROT UR STRIP-MCNC: NEGATIVE MG/DL
RBC # UR STRIP: ABNORMAL /UL
SP GR UR: 1.01 (ref 1–1.03)
UROBILINOGEN UR QL: ABNORMAL

## 2024-08-27 PROCEDURE — 1159F MED LIST DOCD IN RCRD: CPT | Performed by: NURSE PRACTITIONER

## 2024-08-27 PROCEDURE — 81003 URINALYSIS AUTO W/O SCOPE: CPT | Performed by: NURSE PRACTITIONER

## 2024-08-27 PROCEDURE — 87077 CULTURE AEROBIC IDENTIFY: CPT | Performed by: NURSE PRACTITIONER

## 2024-08-27 PROCEDURE — 1160F RVW MEDS BY RX/DR IN RCRD: CPT | Performed by: NURSE PRACTITIONER

## 2024-08-27 PROCEDURE — 1125F AMNT PAIN NOTED PAIN PRSNT: CPT | Performed by: NURSE PRACTITIONER

## 2024-08-27 PROCEDURE — 99213 OFFICE O/P EST LOW 20 MIN: CPT | Performed by: NURSE PRACTITIONER

## 2024-08-27 PROCEDURE — 87186 SC STD MICRODIL/AGAR DIL: CPT | Performed by: NURSE PRACTITIONER

## 2024-08-27 PROCEDURE — 87086 URINE CULTURE/COLONY COUNT: CPT | Performed by: NURSE PRACTITIONER

## 2024-08-27 RX ORDER — NITROFURANTOIN 25; 75 MG/1; MG/1
100 CAPSULE ORAL 2 TIMES DAILY
Qty: 14 CAPSULE | Refills: 0 | Status: SHIPPED | OUTPATIENT
Start: 2024-08-27 | End: 2024-09-03

## 2024-08-27 RX ORDER — OXYCODONE AND ACETAMINOPHEN 7.5; 325 MG/1; MG/1
1 TABLET ORAL EVERY 4 HOURS PRN
COMMUNITY
Start: 2024-08-01

## 2024-08-27 RX ORDER — PSEUDOEPHEDRINE HCL 120 MG/1
120 TABLET, FILM COATED, EXTENDED RELEASE ORAL EVERY 12 HOURS
Qty: 30 TABLET | Refills: 0 | Status: SHIPPED | OUTPATIENT
Start: 2024-08-27

## 2024-08-27 RX ORDER — ASPIRIN 325 MG
325 TABLET ORAL DAILY
COMMUNITY

## 2024-08-27 NOTE — PATIENT INSTRUCTIONS
Increase your fluids, limit caffeine, keep vaginal area clean and dry, wipe front to back after voiding.  We will notify you when your urine culture is available, be aware that we may need to change your antibiotic depending on bacterial growth.  You still have a large amount of fluid on your right ear please this is the cause for your dizziness.  Continue to use your Flonase and use as discussed in office.  I have also sent and a prescription for Sudafed to help with fluid and nasal congestion.

## 2024-08-27 NOTE — PROGRESS NOTES
Chief Complaint  Urinary Tract Infection (X 3 weeks ago. Dysuria, cloudy urine, foul odor. Pt states she is also having dizzy spells when she bends over or laying down in bed )    Subjective      Daisy Cesar is a 64 year old female that presents to [facility] with c/o dysuria, cloudy urine, foul odor, urinary frequency and urgency for the last 3 weeks. She denies hematuria, fever or flank pain.  She also c/o dizziness for the last week. This comes on when she lies down or bends over. She does admit to recent illness with fluid on the ears, she is taking flonase. She still c/o a lot of sinus congestion and pressure as well.      History of Present Illness    Current Outpatient Medications   Medication Instructions    acetaminophen (TYLENOL) 500 MG tablet acetaminophen 500 mg oral tablet take 1 tablet (500 mg) by oral route every 3 hours as needed   Active    Ajovy 225 MG/1.5ML solution auto-injector inject 1.5ml under the skin EVERY 30 DAYS    amitriptyline (ELAVIL) 100 mg, Oral, Every Night at Bedtime    aspirin 325 mg, Oral, Daily    BIOTIN 5000 PO Oral, Daily    Calcium Carb-Cholecalciferol (Caltrate 600+D3 Soft) 600-800 MG-UNIT chewable tablet Caltrate 600 + D 600 mg (1,500 mg)-800 unit oral tablet,chewable chew 1 tablet by oral route daily   Active    colestipol (COLESTID) 1 g, Oral    Diclofenac Sodium (VOLTAREN) 1 % gel gel Topical, 4 Times Daily PRN    dicyclomine (BENTYL) 10 mg, Oral, 2 Times Daily    dihydroergotamine (MIGRANAL) 4 MG/ML nasal spray Migranal 0.5 mg/pump act. (4 mg/mL) nasal spray,non-aerosol spray 1 spray (0.5 mg) by intranasal route in each nostril, repeat 15 minutes later for a total of 4 sprays (2 mg); do not exceed 6 sprays (3 mg) per day or 8 sprays (4 mg) per week   Active    dilTIAZem (CARDIZEM) 30 MG tablet 1 tablet, Oral, Every 12 Hours Scheduled    diphenhydrAMINE (BENADRYL) 25 mg capsule Benadryl 25 mg oral capsule take 2 capsules (50 mg) by  "oral route once daily at bedtime as needed   Active    fluticasone (FLONASE) 50 MCG/ACT nasal spray 2 sprays, Nasal, Daily    ketoconazole (NIZORAL) 2 % cream APPLY TO THE AFFECTED AREA(S) TWICE DAILY ON CHEST DURING FLARES    levothyroxine (SYNTHROID, LEVOTHROID) 50 mcg, Oral, Daily    linaclotide (LINZESS) 72 mcg, Oral, Every Morning Before Breakfast    metroNIDAZOLE (METROCREAM) 0.75 % cream APPLY 1 APPLICATION TOPICALLY TO THE APPROPRIATE AREA AS DIRECTED TWICE DAILY    naproxen (NAPROSYN) 500 mg, Oral, 2 Times Daily PRN, Take with food.    nitrofurantoin (macrocrystal-monohydrate) (MACROBID) 100 mg, Oral, 2 Times Daily    omeprazole (PRILOSEC) 40 mg, Oral, Daily    oxyCODONE-acetaminophen (PERCOCET) 7.5-325 MG per tablet 1 tablet, Oral, Every 4 Hours PRN    pregabalin (LYRICA) 75 mg, Oral, 3 Times Daily    promethazine (PHENERGAN) 25 MG tablet TAKE 1 TABLET BY MOUTH TWICE DAILY AS NEEDED FOR NAUSEA AND VOMITING    pseudoephedrine (SUDAFED) 120 mg, Oral, Every 12 Hours    tiZANidine (ZANAFLEX) 4 mg, Oral, 4 Times Daily    Ventolin  (90 Base) MCG/ACT inhaler USE 1 INHALATION BY MOUTH  EVERY 4 HOURS AS NEEDED FOR WHEEZING       The following portions of the patient's history were reviewed and updated as appropriate: allergies, current medications, past family history, past medical history, past social history, past surgical history, and problem list.    Objective   Vital Signs:   /78 (BP Location: Left arm, Patient Position: Sitting)   Pulse 93   Temp 98.4 °F (36.9 °C) (Oral)   Ht 162.6 cm (64.02\")   SpO2 96%   BMI 36.92 kg/m²     Wt Readings from Last 3 Encounters:   07/05/24 97.6 kg (215 lb 3.2 oz)   02/23/24 97.5 kg (215 lb)   11/09/23 95.7 kg (211 lb)     BP Readings from Last 3 Encounters:   08/27/24 120/78   07/05/24 131/83   02/23/24 111/78     Physical Exam  Vitals and nursing note reviewed.   Constitutional:       Appearance: Normal appearance. She is not ill-appearing.   HENT:      " Right Ear: Ear canal and external ear normal. A middle ear effusion is present.      Left Ear: Tympanic membrane, ear canal and external ear normal.      Ears:      Comments: Large amount of air bubbles behind the right TM.  Cardiovascular:      Rate and Rhythm: Normal rate and regular rhythm.      Heart sounds: Normal heart sounds.   Pulmonary:      Effort: Pulmonary effort is normal.      Breath sounds: Normal breath sounds.   Skin:     General: Skin is warm and dry.      Capillary Refill: Capillary refill takes less than 2 seconds.   Neurological:      Mental Status: She is alert and oriented to person, place, and time.   Psychiatric:         Mood and Affect: Mood normal.         Behavior: Behavior normal.          Result Review :  The following data was reviewed by: DHARA Mike on 08/27/2024:      Common labs          10/24/2023    10:53 11/9/2023    10:19 7/18/2024    12:41   Common Labs   Glucose  99  119    BUN 12     12  14    Creatinine  0.81  0.87    Sodium  139  141    Potassium  4.1  3.8    Chloride  102  104    Calcium  9.3  9.5    Albumin  4.2  4.4    Total Bilirubin  <0.2  0.3    Alkaline Phosphatase  99  108    AST (SGOT)  11  20    ALT (SGPT)  12  12    WBC  7.12  7.34    Hemoglobin  12.5  13.7    Hematocrit  38.6  42.8    Platelets  280  275    Total Cholesterol  262  269    Triglycerides  245  200    HDL Cholesterol  57  58    LDL Cholesterol   160  174       Details          This result is from an external source.               Lab Results (last 72 hours)       Procedure Component Value Units Date/Time    POCT urinalysis dipstick, automated [[id number]]  (Abnormal) Collected: 08/27/24 0940    Specimen: Urine Updated: 08/27/24 0942     Color Yellow     Clarity, UA Clear     Specific Gravity  1.010     pH, Urine 7.0     Leukocytes Large (3+)     Nitrite, UA Positive     Protein, POC Negative mg/dL      Glucose, UA Negative mg/dL      Ketones, UA Negative     Urobilinogen, UA 0.2  E.U./dL     Bilirubin Negative     Blood, UA Trace     Lot Number [id number]     Expiration Date 10,312,024                     Lab Results   Component Value Date    INR 1.04 07/29/2024    BILIRUBINUR Negative 08/27/2024       Procedures        Assessment and Plan   Diagnoses and all orders for this visit:    1. Urinary tract infection in female (Primary)  -     Urine Culture - Urine, Urine, Clean Catch; Future  -     nitrofurantoin, macrocrystal-monohydrate, (Macrobid) 100 MG capsule; Take 1 capsule by mouth 2 (Two) Times a Day for 7 days.  Dispense: 14 capsule; Refill: 0    2. Dysuria  -     POCT urinalysis dipstick, automated    3. Dysfunction of right eustachian tube  -     pseudoephedrine (SUDAFED) 120 MG 12 hr tablet; Take 1 tablet by mouth Every 12 (Twelve) Hours.  Dispense: 30 tablet; Refill: 0    4. Sinus congestion  -     pseudoephedrine (SUDAFED) 120 MG 12 hr tablet; Take 1 tablet by mouth Every 12 (Twelve) Hours.  Dispense: 30 tablet; Refill: 0      Discussed proper administration of flonase.            There are no discontinued medications.       Follow Up   No follow-ups on file.  Patient was given instructions and counseling regarding her condition or for health maintenance advice. Please see specific information pulled into the AVS if appropriate.       DHARA Mike  08/27/24  09:58 EDT

## 2024-08-29 LAB — BACTERIA SPEC AEROBE CULT: ABNORMAL

## 2024-08-29 RX ORDER — SULFAMETHOXAZOLE/TRIMETHOPRIM 800-160 MG
1 TABLET ORAL 2 TIMES DAILY
Qty: 10 TABLET | Refills: 0 | Status: SHIPPED | OUTPATIENT
Start: 2024-08-29 | End: 2024-09-03

## 2024-09-17 DIAGNOSIS — G43.119 INTRACTABLE MIGRAINE WITH AURA WITHOUT STATUS MIGRAINOSUS: ICD-10-CM

## 2024-09-17 DIAGNOSIS — H92.01 RIGHT EAR PAIN: ICD-10-CM

## 2024-09-17 DIAGNOSIS — E03.9 HYPOTHYROIDISM (ACQUIRED): ICD-10-CM

## 2024-09-17 DIAGNOSIS — K21.9 GASTROESOPHAGEAL REFLUX DISEASE WITHOUT ESOPHAGITIS: ICD-10-CM

## 2024-09-17 RX ORDER — LEVOTHYROXINE SODIUM 50 UG/1
50 TABLET ORAL DAILY
Qty: 90 TABLET | Refills: 1 | Status: CANCELLED | OUTPATIENT
Start: 2024-09-17

## 2024-09-17 RX ORDER — OMEPRAZOLE 40 MG/1
40 CAPSULE, DELAYED RELEASE ORAL DAILY
Qty: 90 CAPSULE | Refills: 1 | Status: CANCELLED | OUTPATIENT
Start: 2024-09-17

## 2024-09-17 RX ORDER — FLUTICASONE PROPIONATE 50 MCG
2 SPRAY, SUSPENSION (ML) NASAL DAILY
Qty: 16 G | Refills: 5 | Status: CANCELLED | OUTPATIENT
Start: 2024-09-17 | End: 2025-09-17

## 2024-09-17 RX ORDER — AMITRIPTYLINE HYDROCHLORIDE 100 MG/1
100 TABLET ORAL
Qty: 90 TABLET | Refills: 1 | Status: CANCELLED | OUTPATIENT
Start: 2024-09-17

## 2024-10-31 ENCOUNTER — TELEPHONE (OUTPATIENT)
Dept: FAMILY MEDICINE CLINIC | Age: 64
End: 2024-10-31
Payer: MEDICARE

## 2024-10-31 DIAGNOSIS — Z78.0 POSTMENOPAUSAL: Primary | ICD-10-CM

## 2024-10-31 DIAGNOSIS — G43.119 INTRACTABLE MIGRAINE WITH AURA WITHOUT STATUS MIGRAINOSUS: ICD-10-CM

## 2024-10-31 NOTE — TELEPHONE ENCOUNTER
----- Message from Elise Cueto sent at 11/1/2022  5:21 PM EDT -----  Please call pt and let her know that she is due for repeat DEXA.  Please pend order and send to me to sign.  Thanks, DILMA

## 2024-10-31 NOTE — LETTER
October 31, 2024    Daisy Cesar  [address] [address]      Dear Daisy,    It is time for your screening bone density scan.  Our Referrals department will be calling to get this scheduled for you.  If you wish, you can call [phone] to schedule your own appointment.      Sincerely,    [facility]

## 2024-11-12 DIAGNOSIS — E04.1 THYROID NODULE: Primary | ICD-10-CM

## 2024-11-12 DIAGNOSIS — Z12.31 SCREENING MAMMOGRAM FOR BREAST CANCER: ICD-10-CM

## 2024-11-29 DIAGNOSIS — K21.9 GASTROESOPHAGEAL REFLUX DISEASE WITHOUT ESOPHAGITIS: ICD-10-CM

## 2024-11-29 DIAGNOSIS — E03.9 HYPOTHYROIDISM (ACQUIRED): ICD-10-CM

## 2024-11-29 DIAGNOSIS — G43.119 INTRACTABLE MIGRAINE WITH AURA WITHOUT STATUS MIGRAINOSUS: ICD-10-CM

## 2024-12-02 DIAGNOSIS — G43.119 INTRACTABLE MIGRAINE WITH AURA WITHOUT STATUS MIGRAINOSUS: ICD-10-CM

## 2024-12-02 RX ORDER — LEVOTHYROXINE SODIUM 50 UG/1
50 TABLET ORAL DAILY
Qty: 90 TABLET | Refills: 1 | Status: SHIPPED | OUTPATIENT
Start: 2024-12-02

## 2024-12-02 RX ORDER — AMITRIPTYLINE HYDROCHLORIDE 100 MG/1
100 TABLET ORAL
Qty: 90 TABLET | Refills: 1 | Status: SHIPPED | OUTPATIENT
Start: 2024-12-02

## 2024-12-02 RX ORDER — OMEPRAZOLE 40 MG/1
40 CAPSULE, DELAYED RELEASE ORAL DAILY
Qty: 90 CAPSULE | Refills: 1 | Status: SHIPPED | OUTPATIENT
Start: 2024-12-02

## 2024-12-11 DIAGNOSIS — K58.2 IRRITABLE BOWEL SYNDROME WITH BOTH CONSTIPATION AND DIARRHEA: ICD-10-CM

## 2024-12-11 DIAGNOSIS — G43.119 INTRACTABLE MIGRAINE WITH AURA WITHOUT STATUS MIGRAINOSUS: ICD-10-CM

## 2024-12-11 RX ORDER — DICYCLOMINE HYDROCHLORIDE 10 MG/1
10 CAPSULE ORAL 2 TIMES DAILY
Qty: 180 CAPSULE | Refills: 1 | Status: SHIPPED | OUTPATIENT
Start: 2024-12-11

## 2024-12-11 RX ORDER — PROMETHAZINE HYDROCHLORIDE 25 MG/1
TABLET ORAL
Qty: 180 TABLET | Refills: 1 | Status: SHIPPED | OUTPATIENT
Start: 2024-12-11

## 2024-12-16 ENCOUNTER — TELEPHONE (OUTPATIENT)
Dept: FAMILY MEDICINE CLINIC | Age: 64
End: 2024-12-16
Payer: MEDICARE

## 2024-12-16 NOTE — TELEPHONE ENCOUNTER
"  Caller: Daisy Cesar \"Cristal\"    Relationship to patient: Self    Best call back number: [phone]    Patient is needing: PATIENT CALLED TO ADVISE SHE SCHEDULED ALL OF THE PAST DUE TESTS         "

## 2024-12-26 ENCOUNTER — OFFICE VISIT (OUTPATIENT)
Dept: FAMILY MEDICINE CLINIC | Age: 64
End: 2024-12-26
Payer: MEDICARE

## 2024-12-26 ENCOUNTER — HOSPITAL ENCOUNTER (OUTPATIENT)
Dept: GENERAL RADIOLOGY | Facility: HOSPITAL | Age: 64
Discharge: HOME | End: 2024-12-26
Admitting: FAMILY MEDICINE
Payer: MEDICARE

## 2024-12-26 VITALS
BODY MASS INDEX: 36.92 KG/M2 | SYSTOLIC BLOOD PRESSURE: 126 MMHG | DIASTOLIC BLOOD PRESSURE: 76 MMHG | HEART RATE: 79 BPM | HEIGHT: 64 IN | OXYGEN SATURATION: 94 % | TEMPERATURE: 98.6 F

## 2024-12-26 DIAGNOSIS — E66.812 CLASS 2 SEVERE OBESITY DUE TO EXCESS CALORIES WITH SERIOUS COMORBIDITY AND BODY MASS INDEX (BMI) OF 36.0 TO 36.9 IN ADULT: ICD-10-CM

## 2024-12-26 DIAGNOSIS — L60.0 INGROWN TOENAIL OF RIGHT FOOT: ICD-10-CM

## 2024-12-26 DIAGNOSIS — E03.9 HYPOTHYROIDISM (ACQUIRED): ICD-10-CM

## 2024-12-26 DIAGNOSIS — K21.9 GASTROESOPHAGEAL REFLUX DISEASE WITHOUT ESOPHAGITIS: ICD-10-CM

## 2024-12-26 DIAGNOSIS — E88.2 LIPOMATOSIS: ICD-10-CM

## 2024-12-26 DIAGNOSIS — K58.2 IRRITABLE BOWEL SYNDROME WITH BOTH CONSTIPATION AND DIARRHEA: ICD-10-CM

## 2024-12-26 DIAGNOSIS — E78.2 MIXED HYPERLIPIDEMIA: ICD-10-CM

## 2024-12-26 DIAGNOSIS — M25.511 ACUTE PAIN OF RIGHT SHOULDER: ICD-10-CM

## 2024-12-26 DIAGNOSIS — G43.119 INTRACTABLE MIGRAINE WITH AURA WITHOUT STATUS MIGRAINOSUS: Primary | ICD-10-CM

## 2024-12-26 DIAGNOSIS — E66.01 CLASS 2 SEVERE OBESITY DUE TO EXCESS CALORIES WITH SERIOUS COMORBIDITY AND BODY MASS INDEX (BMI) OF 36.0 TO 36.9 IN ADULT: ICD-10-CM

## 2024-12-26 PROCEDURE — 1125F AMNT PAIN NOTED PAIN PRSNT: CPT | Performed by: FAMILY MEDICINE

## 2024-12-26 PROCEDURE — 99214 OFFICE O/P EST MOD 30 MIN: CPT | Performed by: FAMILY MEDICINE

## 2024-12-26 PROCEDURE — 1159F MED LIST DOCD IN RCRD: CPT | Performed by: FAMILY MEDICINE

## 2024-12-26 PROCEDURE — 73030 X-RAY EXAM OF SHOULDER: CPT

## 2024-12-26 PROCEDURE — 1160F RVW MEDS BY RX/DR IN RCRD: CPT | Performed by: FAMILY MEDICINE

## 2024-12-26 PROCEDURE — G2211 COMPLEX E/M VISIT ADD ON: HCPCS | Performed by: FAMILY MEDICINE

## 2024-12-26 NOTE — ASSESSMENT & PLAN NOTE
Stable on levothyroxine 50 mcg daily.  Refills were not needed today.  Labs were not due today.  Continue to monitor.

## 2024-12-26 NOTE — ASSESSMENT & PLAN NOTE
Stable on omeprazole 40 mg daily.  Refills were not needed today.  Continue to monitor.  Wean PPI as able.

## 2024-12-26 NOTE — ASSESSMENT & PLAN NOTE
Following with [facility].  Stable on Ajovy, Lyrica, diltiazem, amitriptyline, tizanidine, and naproxen, with prn Migranal.  No refills needed.
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
## 2025-01-16 DIAGNOSIS — G43.119 INTRACTABLE MIGRAINE WITH AURA WITHOUT STATUS MIGRAINOSUS: ICD-10-CM

## 2025-01-16 NOTE — TELEPHONE ENCOUNTER
"    Caller: Daisy Cesar \"Cristal\"    Relationship: Self    Best call back number: [phone]    Requested Prescriptions:   Requested Prescriptions     Pending Prescriptions Disp Refills    Diclofenac Sodium (VOLTAREN) 1 % gel gel 100 g 5        Pharmacy where request should be sent:      [facility] [address] [facility] [phone]  - [phone]  [phone]     Last office visit with prescribing clinician: 12/26/2024   Last telemedicine visit with prescribing clinician: Visit date not found   Next office visit with prescribing clinician: 2/27/2025     Additional details provided by patient: PATIENT CALLED IN AND SAID ANNI SAID THIS MEDICATION IS NOT COVERED UNDER HER INSURANCE BUT INSURANCE IS SAYING IT IS COVERED. PATIENT THOUGHT IT MIGHT NEED A P/A    Does the patient have less than a 3 day supply:  [] Yes  [] No    Would you like a call back once the refill request has been completed: [] Yes [] No    If the office needs to give you a call back, can they leave a voicemail: [x] Yes [] No    Brando Rubio Rep   01/16/25 14:47 EST       "

## 2025-01-27 ENCOUNTER — TELEPHONE (OUTPATIENT)
Dept: FAMILY MEDICINE CLINIC | Age: 65
End: 2025-01-27
Payer: MEDICARE

## 2025-01-30 NOTE — TELEPHONE ENCOUNTER
PA for Diclofenac gel denied  Message from plan -   This is an over-the-counter product, which is one of the classes not covered under the Part D coverage.

## 2025-02-14 DIAGNOSIS — J45.30 MILD PERSISTENT ASTHMA WITHOUT COMPLICATION: ICD-10-CM

## 2025-02-14 RX ORDER — ALBUTEROL SULFATE 90 UG/1
AEROSOL, METERED RESPIRATORY (INHALATION)
Qty: 54 G | Refills: 0 | Status: SHIPPED | OUTPATIENT
Start: 2025-02-14

## 2025-03-01 DIAGNOSIS — G89.29 CHRONIC BILATERAL LOW BACK PAIN WITH SCIATICA, SCIATICA LATERALITY UNSPECIFIED: ICD-10-CM

## 2025-03-01 DIAGNOSIS — M54.40 CHRONIC BILATERAL LOW BACK PAIN WITH SCIATICA, SCIATICA LATERALITY UNSPECIFIED: ICD-10-CM

## 2025-03-04 RX ORDER — NAPROXEN 500 MG/1
TABLET ORAL
Qty: 180 TABLET | Refills: 0 | Status: SHIPPED | OUTPATIENT
Start: 2025-03-04

## 2025-03-06 DIAGNOSIS — H92.01 RIGHT EAR PAIN: ICD-10-CM

## 2025-03-06 RX ORDER — FLUTICASONE PROPIONATE 50 MCG
2 SPRAY, SUSPENSION (ML) NASAL DAILY
Qty: 16 G | Refills: 11 | Status: SHIPPED | OUTPATIENT
Start: 2025-03-06

## 2025-03-19 ENCOUNTER — TELEPHONE (OUTPATIENT)
Dept: FAMILY MEDICINE CLINIC | Age: 65
End: 2025-03-19
Payer: MEDICARE

## 2025-03-19 NOTE — TELEPHONE ENCOUNTER
Spoke with Patient and she is prescribed a new monthly injection for osteoporosis by Dr Espino and wanted to know if Rom could do it here.  She is going to bring all the information with her at her appt.

## 2025-03-19 NOTE — TELEPHONE ENCOUNTER
"Caller: Daisy Cesar \"Cristal\"    Relationship to patient: Self    Best call back number: [phone]     Patient is needing: PATIENT IS REQUESTING A CALL BACK. SHE STATED THAT SHE IS WANTING TO KNOW CAN SHE HAVE THE BONE INJECTION DONE AT [facility]. PLEASE CALL AND ADVISE          "

## 2025-03-28 ENCOUNTER — TELEPHONE (OUTPATIENT)
Dept: FAMILY MEDICINE CLINIC | Age: 65
End: 2025-03-28
Payer: MEDICARE

## 2025-03-28 DIAGNOSIS — G43.119 INTRACTABLE MIGRAINE WITH AURA WITHOUT STATUS MIGRAINOSUS: ICD-10-CM

## 2025-03-28 RX ORDER — PROMETHAZINE HYDROCHLORIDE 25 MG/1
TABLET ORAL
Qty: 180 TABLET | Refills: 1 | Status: CANCELLED | OUTPATIENT
Start: 2025-03-28

## 2025-03-28 NOTE — TELEPHONE ENCOUNTER
"    Caller: Daisy Cesar \"Cristal\"    Relationship: Self    Best call back number: [phone] OK TO LEAVE A VOICEMAIL    Requested Prescriptions:   Requested Prescriptions     Pending Prescriptions Disp Refills    promethazine (PHENERGAN) 25 MG tablet 180 tablet 1        Pharmacy where request should be sent: [facility] [address] [facility] [phone] [facility] [phone] FX     Last office visit with prescribing clinician: 12/26/2024   Last telemedicine visit with prescribing clinician: Visit date not found   Next office visit with prescribing clinician: 4/3/2025     Additional details provided by patient: PATIENT HAS LESS THAN A THREE DAY SUPPLY OF MEDICATION LEFT. PHARMACY SAYS THE INSURANCE WILL NOT COVER THIS MEDICATION WITHOUT A PRIOR AUTHORIZATION.     PLEASE SEND NEW PA TO INSURANCE AND PRESCRIPTION TO PHARMACY ASAP AS PATIENT HAS LESS THAN THREE DAYS OF MEDICINE.    Does the patient have less than a 3 day supply:  [x] Yes  [] No    Would you like a call back once the refill request has been completed: [] Yes [] No    If the office needs to give you a call back, can they leave a voicemail: [] Yes [] No    Brando Franks Rep   03/28/25 11:03 EDT           "

## 2025-04-02 ENCOUNTER — TELEPHONE (OUTPATIENT)
Dept: FAMILY MEDICINE CLINIC | Age: 65
End: 2025-04-02
Payer: MEDICARE

## 2025-04-03 ENCOUNTER — OFFICE VISIT (OUTPATIENT)
Dept: FAMILY MEDICINE CLINIC | Age: 65
End: 2025-04-03
Payer: MEDICARE

## 2025-04-03 VITALS
TEMPERATURE: 98.9 F | DIASTOLIC BLOOD PRESSURE: 76 MMHG | HEART RATE: 84 BPM | WEIGHT: 215 LBS | BODY MASS INDEX: 36.7 KG/M2 | SYSTOLIC BLOOD PRESSURE: 131 MMHG | HEIGHT: 64 IN | OXYGEN SATURATION: 92 %

## 2025-04-03 DIAGNOSIS — K21.9 GASTROESOPHAGEAL REFLUX DISEASE WITHOUT ESOPHAGITIS: ICD-10-CM

## 2025-04-03 DIAGNOSIS — L71.9 ROSACEA: ICD-10-CM

## 2025-04-03 DIAGNOSIS — E03.9 HYPOTHYROIDISM (ACQUIRED): ICD-10-CM

## 2025-04-03 DIAGNOSIS — K58.2 IRRITABLE BOWEL SYNDROME WITH BOTH CONSTIPATION AND DIARRHEA: ICD-10-CM

## 2025-04-03 DIAGNOSIS — G43.119 INTRACTABLE MIGRAINE WITH AURA WITHOUT STATUS MIGRAINOSUS: Primary | ICD-10-CM

## 2025-04-03 RX ORDER — OMEPRAZOLE 40 MG/1
40 CAPSULE, DELAYED RELEASE ORAL DAILY
Qty: 90 CAPSULE | Refills: 1 | Status: SHIPPED | OUTPATIENT
Start: 2025-04-03

## 2025-04-03 RX ORDER — LEVOTHYROXINE SODIUM 50 UG/1
50 TABLET ORAL DAILY
Qty: 90 TABLET | Refills: 1 | Status: SHIPPED | OUTPATIENT
Start: 2025-04-03

## 2025-04-03 RX ORDER — DICYCLOMINE HYDROCHLORIDE 10 MG/1
10 CAPSULE ORAL 2 TIMES DAILY
Qty: 180 CAPSULE | Refills: 1 | Status: SHIPPED | OUTPATIENT
Start: 2025-04-03

## 2025-04-03 NOTE — ASSESSMENT & PLAN NOTE
Following with [facility].  Stable on Ajovy, Lyrica, diltiazem, amitriptyline, tizanidine, and naproxen, with prn Migranal.  Refills sent.  Orders:    tiZANidine (ZANAFLEX) 4 MG tablet; Take 1 tablet by mouth 4 (Four) Times a Day.

## 2025-04-03 NOTE — ASSESSMENT & PLAN NOTE
Stable on metronidazole cream daily.  Refills were needed today.  Continue to monitor.  Orders:    metroNIDAZOLE (METROCREAM) 0.75 % cream; Apply  topically to the appropriate area as directed 2 (Two) Times a Day.

## 2025-04-03 NOTE — ASSESSMENT & PLAN NOTE
Stable on levothyroxine 50 mcg daily.  Refills were needed today.  Labs were not due today.  Continue to monitor.  Orders:    levothyroxine (SYNTHROID, LEVOTHROID) 50 MCG tablet; Take 1 tablet by mouth Daily.

## 2025-04-03 NOTE — PROGRESS NOTES
Chief Complaint  Intractable migraine with aura without status migrainosus    Subjective          Daisy Cesar presents to [facility] today for routine f/u of chronic issues.     She tripped at home and landed on her L leg again.  She is back in the walking boot.  She saw Dr. Espino (who did her previous L ankle ORIF) and he put her back in the boot.  She goes back to see him 5/13.  She has some redness just superior to the well healed scar.      She has had ongoing low back and neck pain.  She has previously had MRI C spine, last done 7/2024.  She has previously completed PT without effect.      +Long-term history of refractory migraines.  Follows with [facility].  She is on Ajovy, amitriptyline, diclofenac gel, prn Migranal, prn naproxen, Lyrica, promethazine and tizanidine for treatment.  She has previously used  Emgality (constipation), gabapentin, Maxalt and Imitrex (too sedating), naratriptan (elevated BP), zonisamide (abd pain and diarrhea), topiramate (BPPV), Flector patches (insurance declined).  She has previously done occipital nerve blocks.  She feels as though the lesions in the neck, previously diagnosed as lipomatosis, worsen her headaches.  She has had some conflicting opinions on this point, as Plastic Surgery did not think that the lesions were lipomas but Dermatology did.  S/p MRI C-spine and brain 8/2024 by Neuro which were unremarkable.  Follows with Pain Management Dr. Benitez for spondylosis and cervical radiculopathy with epidural.      She is on levothyroxine for hypothyroidism.  No heat/cold intolerance, no changes in skin or hair.    Intolerant of statins (myalgias).    She is on omeprazole for GERD.  No heartburn or indigestion.  She is on dicyclomine and colestipol for IBS and abdominal cramping.  Follows with Dr. Whitt.  Status post colonoscopy 9/2023.  She has previously been on Linzess (ineffective) for chronic constipation.  She has previously  tried erythromycin (ineffective) and metoclopramide (tremors) for gastroparesis.       She has had problems with BPPV in the past, managed by ENT.      Current Outpatient Medications:   •  acetaminophen (TYLENOL) 500 MG tablet, acetaminophen 500 mg oral tablet take 1 tablet (500 mg) by oral route every 3 hours as needed   Active, Disp: , Rfl:   •  Ajovy 225 MG/1.5ML solution auto-injector, inject 1.5ml under the skin EVERY 30 DAYS, Disp: , Rfl:   •  amitriptyline (ELAVIL) 100 MG tablet, TAKE 1 TABLET BY MOUTH EVERY NIGHT AT BEDTIME, Disp: 90 tablet, Rfl: 1  •  BIOTIN 5000 PO, Take  by mouth Daily., Disp: , Rfl:   •  Calcium Carb-Cholecalciferol (Caltrate 600+D3 Soft) 600-800 MG-UNIT chewable tablet, Caltrate 600 + D 600 mg (1,500 mg)-800 unit oral tablet,chewable chew 1 tablet by oral route daily   Active, Disp: , Rfl:   •  Diclofenac Sodium (VOLTAREN) 1 % gel gel, APPLY topically TO THE appropriate AREA FOUR TIMES DAILY AS NEEDED FOR neck pain, Disp: 100 g, Rfl: 5  •  dicyclomine (BENTYL) 10 MG capsule, TAKE 1 CAPSULE BY MOUTH TWICE DAILY, Disp: 180 capsule, Rfl: 1  •  dihydroergotamine (MIGRANAL) 4 MG/ML nasal spray, Migranal 0.5 mg/pump act. (4 mg/mL) nasal spray,non-aerosol spray 1 spray (0.5 mg) by intranasal route in each nostril, repeat 15 minutes later for a total of 4 sprays (2 mg); do not exceed 6 sprays (3 mg) per day or 8 sprays (4 mg) per week   Active, Disp: , Rfl:   •  diphenhydrAMINE (BENADRYL) 25 mg capsule, Benadryl 25 mg oral capsule take 2 capsules (50 mg) by oral route once daily at bedtime as needed   Active, Disp: , Rfl:   •  fluticasone (FLONASE) 50 MCG/ACT nasal spray, USE 2 SPRAYS IN EACH NOSTRIL DAILY AS DIRECTED, Disp: 16 g, Rfl: 11  •  ketoconazole (NIZORAL) 2 % cream, APPLY TO THE AFFECTED AREA(S) TWICE DAILY ON CHEST DURING FLARES, Disp: , Rfl:   •  levothyroxine (SYNTHROID, LEVOTHROID) 50 MCG tablet, TAKE 1 TABLET BY MOUTH ONCE DAILY, Disp: 90 tablet, Rfl: 1  •  metroNIDAZOLE  "(METROCREAM) 0.75 % cream, APPLY 1 APPLICATION TOPICALLY TO THE APPROPRIATE AREA AS DIRECTED TWICE DAILY, Disp: 90 g, Rfl: 1  •  naproxen (NAPROSYN) 500 MG tablet, TAKE 1 TABLET BY MOUTH TWICE DAILY AS NEEDED FOR mild pain - take with food, Disp: 180 tablet, Rfl: 0  •  omeprazole (priLOSEC) 40 MG capsule, TAKE 1 CAPSULE BY MOUTH ONCE DAILY, Disp: 90 capsule, Rfl: 1  •  pregabalin (LYRICA) 75 MG capsule, Take 100 mg by mouth 3 (Three) Times a Day., Disp: , Rfl:   •  promethazine (PHENERGAN) 25 MG tablet, TAKE 1 TABLET BY MOUTH TWICE DAILY AS NEEDED FOR NAUSEA AND VOMITING, Disp: 180 tablet, Rfl: 1  •  tiZANidine (ZANAFLEX) 4 MG tablet, TAKE 1 TABLET BY MOUTH FOUR TIMES DAILY, Disp: 360 tablet, Rfl: 1  •  Ventolin  (90 Base) MCG/ACT inhaler, inhale 1 PUFF BY MOUTH EVERY 4 HOURS AS NEEDED FOR wheezing, Disp: 54 g, Rfl: 0  •  colestipol (COLESTID) 1 g tablet, Take 1 tablet by mouth., Disp: , Rfl:   •  pseudoephedrine (SUDAFED) 120 MG 12 hr tablet, Take 1 tablet by mouth Every 12 (Twelve) Hours. (Patient not taking: Reported on 4/3/2025), Disp: 30 tablet, Rfl: 0  There are no discontinued medications.        Allergies:  Morphine and Penicillins      Objective   Vital Signs:   Vitals:    04/03/25 1001   BP: 141/80   BP Location: Left arm   Patient Position: Sitting   Cuff Size: Large Adult   Pulse: 84   Temp: 98.9 °F (37.2 °C)   TempSrc: Oral   SpO2: 92%   Weight: 97.5 kg (215 lb)   Height: 162.6 cm (64.02\")     Body mass index is 36.89 kg/m².           Physical Exam  Vitals reviewed.   Constitutional:       General: She is not in acute distress.     Appearance: Normal appearance. She is well-developed.   HENT:      Head: Normocephalic and atraumatic.      Right Ear: External ear normal.      Left Ear: External ear normal.   Eyes:      Extraocular Movements: Extraocular movements intact.      Conjunctiva/sclera: Conjunctivae normal.      Pupils: Pupils are equal, round, and reactive to light.   Cardiovascular:      " Rate and Rhythm: Normal rate and regular rhythm.      Heart sounds: No murmur heard.  Pulmonary:      Effort: Pulmonary effort is normal.      Breath sounds: Normal breath sounds. No wheezing, rhonchi or rales.   Abdominal:      General: Bowel sounds are normal. There is no distension.      Palpations: Abdomen is soft.      Tenderness: There is no abdominal tenderness.   Musculoskeletal:         General: Normal range of motion.   Neurological:      Mental Status: She is alert.   Psychiatric:         Mood and Affect: Affect normal.             Lab Results   Component Value Date    GLUCOSE 119 (H) 07/18/2024    BUN 14 07/18/2024    CREATININE 0.87 07/18/2024    BCR 16.1 07/18/2024    K 3.8 07/18/2024    CO2 25.0 07/18/2024    CALCIUM 9.5 07/18/2024    ALBUMIN 4.4 07/18/2024    AST 20 07/18/2024    ALT 12 07/18/2024       Lab Results   Component Value Date    CHOL 269 (H) 07/18/2024    CHLPL 245 (H) 03/31/2021    TRIG 200 (H) 07/18/2024    HDL 58 07/18/2024     (H) 07/18/2024       Lab Results   Component Value Date    WBC 7.34 07/18/2024    HGB 13.7 07/18/2024    HCT 42.8 07/18/2024    MCV 88.2 07/18/2024     07/18/2024            Assessment and Plan    Assessment & Plan  Intractable migraine with aura without status migrainosus    Following with [facility].  Stable on Ajovy, Lyrica, diltiazem, amitriptyline, tizanidine, and naproxen, with prn Migranal.  Refills sent.  Orders:  •  tiZANidine (ZANAFLEX) 4 MG tablet; Take 1 tablet by mouth 4 (Four) Times a Day.    Irritable bowel syndrome with both constipation and diarrhea  Following with Dr. Whitt.  Stable on dicyclomine 10 mg BID.  Refills sent.  Orders:  •  dicyclomine (BENTYL) 10 MG capsule; Take 1 capsule by mouth 2 (Two) Times a Day.    Hypothyroidism (acquired)  Stable on levothyroxine 50 mcg daily.  Refills were needed today.  Labs were not due today.  Continue to monitor.  Orders:  •  levothyroxine (SYNTHROID, LEVOTHROID) 50 MCG  tablet; Take 1 tablet by mouth Daily.    Gastroesophageal reflux disease without esophagitis  Stable on omeprazole 40 mg daily.  Refills were needed today.  Continue to monitor.  Wean PPI as able.  Orders:  •  omeprazole (priLOSEC) 40 MG capsule; Take 1 capsule by mouth Daily.    Rosacea  Stable on metronidazole cream daily.  Refills were needed today.  Continue to monitor.  Orders:  •  metroNIDAZOLE (METROCREAM) 0.75 % cream; Apply  topically to the appropriate area as directed 2 (Two) Times a Day.        Follow Up   No follow-ups on file.  Patient was given instructions and counseling regarding her condition or for health maintenance advice. Please see specific information pulled into the AVS if appropriate.           12/26/2024

## 2025-04-03 NOTE — ASSESSMENT & PLAN NOTE
Following with Dr. Whitt.  Stable on dicyclomine 10 mg BID.  Refills sent.  Orders:    dicyclomine (BENTYL) 10 MG capsule; Take 1 capsule by mouth 2 (Two) Times a Day.

## 2025-04-15 ENCOUNTER — HOSPITAL ENCOUNTER (OUTPATIENT)
Dept: BONE DENSITY | Facility: HOSPITAL | Age: 65
Discharge: HOME | End: 2025-04-15
Payer: MEDICARE

## 2025-04-15 ENCOUNTER — HOSPITAL ENCOUNTER (OUTPATIENT)
Dept: ULTRASOUND IMAGING | Facility: HOSPITAL | Age: 65
Discharge: HOME | End: 2025-04-15
Payer: MEDICARE

## 2025-04-15 DIAGNOSIS — E04.1 THYROID NODULE: ICD-10-CM

## 2025-04-15 DIAGNOSIS — Z78.0 POSTMENOPAUSAL: ICD-10-CM

## 2025-04-15 PROCEDURE — 77080 DXA BONE DENSITY AXIAL: CPT

## 2025-04-15 PROCEDURE — 76536 US EXAM OF HEAD AND NECK: CPT

## 2025-04-17 ENCOUNTER — RESULTS FOLLOW-UP (OUTPATIENT)
Dept: FAMILY MEDICINE CLINIC | Age: 65
End: 2025-04-17
Payer: MEDICARE

## 2025-04-26 DIAGNOSIS — G43.119 INTRACTABLE MIGRAINE WITH AURA WITHOUT STATUS MIGRAINOSUS: ICD-10-CM

## 2025-04-29 ENCOUNTER — TRANSCRIBE ORDERS (OUTPATIENT)
Dept: ADMINISTRATIVE | Facility: HOSPITAL | Age: 65
End: 2025-04-29
Payer: MEDICARE

## 2025-04-29 DIAGNOSIS — M48.062 SPINAL STENOSIS OF LUMBAR REGION WITH NEUROGENIC CLAUDICATION: Primary | ICD-10-CM

## 2025-04-29 RX ORDER — PROMETHAZINE HYDROCHLORIDE 25 MG/1
TABLET ORAL
Qty: 180 TABLET | Refills: 1 | Status: SHIPPED | OUTPATIENT
Start: 2025-04-29

## 2025-05-02 ENCOUNTER — TELEPHONE (OUTPATIENT)
Dept: ORTHOPEDIC SURGERY | Facility: CLINIC | Age: 65
End: 2025-05-02
Payer: MEDICARE

## 2025-05-02 NOTE — TELEPHONE ENCOUNTER
"Caller: Daisy Cesar \"Cristal\" / PATIENT     Best call back number: [phone]    PLEASE NOTIFY PATIENT IF / WHEN NEW PATIENT PAPERWORK (INCLUDING [facility]?) CAN BE MAILED TO PATIENT's HOME ADDRESS:   [address] [place] [address]     SO PATIENT CAN COMPLETE PRIOR TO 05-13-25 NEW PATIENT APPT     THANKS   "

## 2025-05-13 ENCOUNTER — OFFICE VISIT (OUTPATIENT)
Dept: ORTHOPEDIC SURGERY | Facility: CLINIC | Age: 65
End: 2025-05-13
Payer: MEDICARE

## 2025-05-13 VITALS
OXYGEN SATURATION: 92 % | WEIGHT: 215 LBS | DIASTOLIC BLOOD PRESSURE: 82 MMHG | HEIGHT: 64 IN | BODY MASS INDEX: 36.7 KG/M2 | HEART RATE: 89 BPM | SYSTOLIC BLOOD PRESSURE: 138 MMHG

## 2025-05-13 DIAGNOSIS — S49.91XA RIGHT SHOULDER INJURY, INITIAL ENCOUNTER: Primary | ICD-10-CM

## 2025-05-13 RX ORDER — DICLOFENAC SODIUM 75 MG/1
75 TABLET, DELAYED RELEASE ORAL 2 TIMES DAILY
Qty: 60 TABLET | Refills: 1 | Status: SHIPPED | OUTPATIENT
Start: 2025-05-13

## 2025-05-13 NOTE — PROGRESS NOTES
"Chief Complaint  Follow-up of the Right Shoulder     Subjective      Daisy Cesar presents to [facility] for follow up of the right shoulder.  She has had pain in the shoulder since the end of July.  She had a walker and the walker slid out from her and she hit her shoulder on the bath tub.  She is having continued pain in the shoulder.  The pain has increased and has pain with cross body movement and on the lateral aspect of the shoulder and down the arm.      Allergies   Allergen Reactions    Morphine Unknown - Low Severity    Penicillins Unknown - Low Severity        Social History     Socioeconomic History    Marital status: Single   Tobacco Use    Smoking status: Never     Passive exposure: Never    Smokeless tobacco: Never   Vaping Use    Vaping status: Never Used        I reviewed the patient's chief complaint, history of present illness, review of systems, past medical history, surgical history, family history, social history, medications, and allergy list.     Review of Systems     Constitutional: Denies fevers, chills, weight loss  Cardiovascular: Denies chest pain, shortness of breath  Skin: Denies rashes, acute skin changes  Neurologic: Denies headache, loss of consciousness        Vital Signs:   /82   Pulse 89   Ht 162.6 cm (64.02\")   Wt 97.5 kg (215 lb)   SpO2 92%   BMI 36.89 kg/m²          Physical Exam  General: Alert. No acute distress    Ortho Exam        RIGHT SHOULDER Forward flexion 140 with pain. Abduction 80. External rotation 40. Internal rotation SI joint. Positive Cross body adduction. Supraspinatus strength 4/5. Infraspinatus Strength 5/5. Infrared subscap 5/5. Positive Freitas. Positive Neer. Negative Apprehension. Negative Lift off. (Negative Obriens. Sensation intact to light touch, median, radial, ulnar nerve. Positive AIN, PIN, ulnar nerve motor. Positive pulses. Positive Impingement signs. Good strength in triceps, biceps, deltoid, wrist " extensors and wrist flexors. Tender to palpation to the top of the AC joint, the lateral aspect of the shoulder and down the arm.         Procedures      Imaging Results (Most Recent)       None             Result Review :            Narrative & Impression   XR SHOULDER 2+ VW RIGHT     Date of Exam: 12/26/2024 9:45 AM EST     Indication: R shoulder pain since fall in August     Comparison: 12/4/2019     Findings:  The right glenohumeral and AC joints are intact. Mild degenerative changes are present. No fractures, dislocations or acute osseous abnormalities are identified.     IMPRESSION:  Impression:  Mild degenerative change. No acute osseous abnormalities are identified.              Assessment and Plan     Diagnoses and all orders for this visit:    1. Right shoulder injury, initial encounter (Primary)        Discussed the treatment plan with the patient. I reviewed the X-rays that were obtained 12/26/24 with the patient.       HEP exercises.   MRI of the right shoulder to assess the structure.   Prescribed anti inflammatory.        Call or return if worsening symptoms.    Follow Up     After MRI of the right shoulder.      Patient was given instructions and counseling regarding her condition or for health maintenance advice. Please see specific information pulled into the AVS if appropriate.     Scribed for Melony Cortez MD by Josiane Lockhart MA.  05/13/25   14:00 EDT    I have personally performed the services described in this document as scribed by the above individual and it is both accurate and complete. Melony Cortez MD 05/13/25

## 2025-05-14 DIAGNOSIS — J45.30 MILD PERSISTENT ASTHMA WITHOUT COMPLICATION: ICD-10-CM

## 2025-05-14 RX ORDER — ALBUTEROL SULFATE 90 UG/1
1 AEROSOL, METERED RESPIRATORY (INHALATION) EVERY 4 HOURS PRN
Qty: 54 G | Refills: 0 | Status: SHIPPED | OUTPATIENT
Start: 2025-05-14

## 2025-05-31 DIAGNOSIS — G43.119 INTRACTABLE MIGRAINE WITH AURA WITHOUT STATUS MIGRAINOSUS: ICD-10-CM

## 2025-06-02 RX ORDER — AMITRIPTYLINE HYDROCHLORIDE 100 MG/1
100 TABLET ORAL
Qty: 90 TABLET | Refills: 1 | Status: SHIPPED | OUTPATIENT
Start: 2025-06-02

## 2025-06-13 ENCOUNTER — HOSPITAL ENCOUNTER (OUTPATIENT)
Dept: MRI IMAGING | Facility: HOSPITAL | Age: 65
Discharge: HOME | End: 2025-06-13
Payer: MEDICARE

## 2025-06-13 DIAGNOSIS — S49.91XA RIGHT SHOULDER INJURY, INITIAL ENCOUNTER: ICD-10-CM

## 2025-06-13 DIAGNOSIS — M48.062 SPINAL STENOSIS OF LUMBAR REGION WITH NEUROGENIC CLAUDICATION: ICD-10-CM

## 2025-06-13 LAB
CREAT BLDA-MCNC: 0.9 MG/DL (ref 0.6–1.3)
EGFRCR SERPLBLD CKD-EPI 2021: 70.7 ML/MIN/1.73

## 2025-06-13 PROCEDURE — 72158 MRI LUMBAR SPINE W/O & W/DYE: CPT

## 2025-06-13 PROCEDURE — 82565 ASSAY OF CREATININE: CPT

## 2025-06-13 PROCEDURE — 73221 MRI JOINT UPR EXTREM W/O DYE: CPT

## 2025-06-13 PROCEDURE — 25510000002 GADOBENATE DIMEGLUMINE 529 MG/ML SOLUTION: Performed by: PHYSICIAN ASSISTANT

## 2025-06-13 PROCEDURE — A9577 INJ MULTIHANCE: HCPCS | Performed by: PHYSICIAN ASSISTANT

## 2025-06-13 RX ADMIN — GADOBENATE DIMEGLUMINE 20 ML: 529 INJECTION, SOLUTION INTRAVENOUS at 11:52

## 2025-06-24 ENCOUNTER — OFFICE VISIT (OUTPATIENT)
Dept: ORTHOPEDIC SURGERY | Facility: CLINIC | Age: 65
End: 2025-06-24
Payer: MEDICARE

## 2025-06-24 VITALS
BODY MASS INDEX: 36.7 KG/M2 | OXYGEN SATURATION: 93 % | HEIGHT: 64 IN | WEIGHT: 215 LBS | HEART RATE: 90 BPM | SYSTOLIC BLOOD PRESSURE: 153 MMHG | DIASTOLIC BLOOD PRESSURE: 77 MMHG

## 2025-06-24 DIAGNOSIS — S49.91XD INJURY OF RIGHT SHOULDER, SUBSEQUENT ENCOUNTER: Primary | ICD-10-CM

## 2025-06-24 RX ORDER — DICLOFENAC SODIUM 75 MG/1
75 TABLET, DELAYED RELEASE ORAL 2 TIMES DAILY
Qty: 60 TABLET | Refills: 1 | Status: SHIPPED | OUTPATIENT
Start: 2025-06-24

## 2025-06-26 NOTE — PROGRESS NOTES
"Chief Complaint  Follow-up of the Right Shoulder     Subjective      Daisy Cesar presents to [facility] for follow up of the right shoulder.  She had a MRI and is here to review.  She has been taking anti inflammatory that helps some.  She has had pain in the shoulder since the end of July. She had a walker and the walker slid out from her and she hit her shoulder on the bath tub. She is having continued pain in the shoulder. The pain has increased and has pain with cross body movement and on the lateral aspect of the shoulder and down the arm.     Allergies   Allergen Reactions    Morphine Unknown - Low Severity    Penicillins Unknown - Low Severity        Social History     Socioeconomic History    Marital status: Single   Tobacco Use    Smoking status: Never     Passive exposure: Never    Smokeless tobacco: Never   Vaping Use    Vaping status: Never Used        I reviewed the patient's chief complaint, history of present illness, review of systems, past medical history, surgical history, family history, social history, medications, and allergy list.     Review of Systems     Constitutional: Denies fevers, chills, weight loss  Cardiovascular: Denies chest pain, shortness of breath  Skin: Denies rashes, acute skin changes  Neurologic: Denies headache, loss of consciousness      Vital Signs:   /77   Pulse 90   Ht 162.6 cm (64.02\")   Wt 97.5 kg (215 lb)   SpO2 93%   BMI 36.88 kg/m²          Physical Exam  General: Alert. No acute distress    Ortho Exam        RIGHT SHOULDER Forward flexion 70 with pain. Abduction 80. External rotation 40. Internal rotation SI joint. Positive Cross body adduction. Supraspinatus strength 4/5. Infraspinatus Strength 5/5. Infrared subscap 5/5. Positive Freitas. Positive Neer. Negative Apprehension. Negative Lift off. (Negative Obriens. Sensation intact to light touch, median, radial, ulnar nerve. Positive AIN, PIN, ulnar nerve motor. Positive " pulses. Positive Impingement signs. Good strength in triceps, biceps, deltoid, wrist extensors and wrist flexors. Tender to palpation to the top of the AC joint, the lateral aspect of the shoulder and down the arm.            Procedures      Imaging Results (Most Recent)       None             Result Review :         MRI Shoulder Right Without Contrast  Result Date: 6/17/2025  Narrative: MRI SHOULDER RIGHT WO CONTRAST Date of Exam: 6/13/2025 10:00 AM EDT Indication: right shoulder pain.  Comparison: Shoulder radiograph 12/26/2024 Technique:  Routine multiplanar/multisequence images of the right shoulder were obtained without contrast administration.  Findings: Rotator cuff: Supraspinatus tendon: No tendon abnormality. No significant muscle atrophy. Infraspinatus tendon: Mild distal tendinosis.. No significant muscle atrophy. Subscapularis tendon: No tendon abnormality. No significant muscle atrophy. Teres minor tendon: Mild tendinosis. No significant muscle atrophy. Glenohumeral joint: Humeral head is centrally located within the glenoid. There is moderate joint space narrowing. There is a small joint effusion. There is a subchondral fracture at the superior medial humeral head with adjacent subchondral cyst. The fracture line measures  1.4 cm cranial caudal. It measures 1.7 m anterior posterior. There are small osteophytes. There is full-thickness cartilage loss along the articular surface. No significant glenoid retroversion. The inferior joint capsule is normal thickness and normal signal intensity. Labrum: No there is diffuse tearing of the labrum. No paralabral cysts. Biceps tendon: Long head of the biceps tendon is appropriately located within the bicipital groove. The proximal long head biceps tendon has increased signal intensity and is thickened. It inserts appropriately on the labrum. Acromioclavicular Joint: Normal for age. Mild degenerative change. No abnormal bone marrow edema. No os acromiale. No  significant lateral downsloping of the acromion. Bone: No fractures or aggressive osseous lesions. Bone marrow signal intensity is normal. Miscellaneous: No significant subacromial subdeltoid fluid. No pathologically enlarged axillary lymph nodes. Deltoid muscle has normal size and signal intensity.     Impression: 1.There is moderate arthritis of the glenohumeral joint with small joint effusion and subchondral fracture of the superior medial humeral head. 2.Mild tendinosis of the distal infraspinatus and teres minor tendons. 3.Tendinosis versus partial thickness interstitial tearing of the proximal long head biceps tendon. 4.Mild, age-appropriate degenerative change of the acromioclavicular joint. Electronically Signed: Nikki Mendez MD  6/17/2025 4:28 PM EDT  Workstation ID: [id number]    MRI Lumbar Spine With & Without Contrast  Result Date: 6/13/2025  Narrative: MRI LUMBAR SPINE W WO CONTRAST Date of Exam: 6/13/2025 11:06 AM EDT Indication: M48.062.  Comparison: None available. Technique:  Routine multiplanar/multisequence sequence images of the lumbar spine were obtained before and after the uneventful administration of 20 cc Multihance. Findings: Lumbar vertebral body height and alignment are normal. The intervertebral disc spaces are maintained. No spondylolysis. No retroperitoneal mass or adenopathy. The conus medullaris terminates at the L2 vertebral body level. No cord tethering. No  abnormal enhancement to suggest pathologic scar tissue formation. L1-L2: No significant spinal canal or foraminal narrowing. L2-L3: Disc bulge with ligamentum flavum thickening. Mild canal stenosis. Mild left foraminal narrowing. L3-L4: Disc bulge with facet disease and ligamentum flavum thickening. No canal stenosis. Mild foraminal narrowing. L4-L5: Disc bulge. Facet disease and ligamentum flavum thickening. Mild canal stenosis. Moderate foraminal narrowing. L4-L5: Disc bulge with minimal canal stenosis. Facet disease. Mild  foraminal narrowing.     Impression: Multilevel mild degenerative changes of the lumbar spine. Electronically Signed: Joey Thompson MD  6/13/2025 8:05 PM EDT  Workstation ID: [id number]             Assessment and Plan     Diagnoses and all orders for this visit:    1. Injury of right shoulder, subsequent encounter (Primary)  -     Ambulatory Referral to Physical Therapy for Evaluation & Treatment  -     diclofenac (VOLTAREN) 75 MG EC tablet; Take 1 tablet by mouth 2 (Two) Times a Day.  Dispense: 60 tablet; Refill: 1        Discussed the treatment plan with the patient. I reviewed the MRI results with the patient.     Prescribed anti inflammatory.  Prescribed physical therapy.      Call or return if worsening symptoms.    Follow Up     4-6 weeks to assess ROM and pain of the right shoulder.        Patient was given instructions and counseling regarding her condition or for health maintenance advice. Please see specific information pulled into the AVS if appropriate.     Transcribed for Melony Cortez MD by Josiane Lockhart MA.  06/25/25   21:32 EDT      I have personally performed the services described in this document as scribed by the above individual and it is both accurate and complete. Melony Cortez MD 06/26/25

## 2025-07-07 ENCOUNTER — TELEPHONE (OUTPATIENT)
Dept: FAMILY MEDICINE CLINIC | Age: 65
End: 2025-07-07
Payer: MEDICARE

## 2025-07-07 NOTE — TELEPHONE ENCOUNTER
1st attempt. LM re overdue mammogram placed 11/12/24 with expected date of 11/26/24. Diagnostic # left on vm for pt to reschedule at her convenience.

## 2025-08-05 ENCOUNTER — OFFICE VISIT (OUTPATIENT)
Dept: ORTHOPEDIC SURGERY | Facility: CLINIC | Age: 65
End: 2025-08-05
Payer: MEDICARE

## 2025-08-05 VITALS
DIASTOLIC BLOOD PRESSURE: 82 MMHG | WEIGHT: 220 LBS | HEIGHT: 64 IN | SYSTOLIC BLOOD PRESSURE: 130 MMHG | BODY MASS INDEX: 37.56 KG/M2 | HEART RATE: 85 BPM | OXYGEN SATURATION: 92 %

## 2025-08-05 DIAGNOSIS — M75.01 ADHESIVE CAPSULITIS OF RIGHT SHOULDER: ICD-10-CM

## 2025-08-05 DIAGNOSIS — M19.011 OSTEOARTHRITIS OF RIGHT SHOULDER, UNSPECIFIED OSTEOARTHRITIS TYPE: ICD-10-CM

## 2025-08-05 DIAGNOSIS — S49.91XD INJURY OF RIGHT SHOULDER, SUBSEQUENT ENCOUNTER: Primary | ICD-10-CM

## 2025-08-05 DIAGNOSIS — S49.91XA RIGHT SHOULDER INJURY, INITIAL ENCOUNTER: ICD-10-CM

## 2025-08-05 RX ORDER — DICLOFENAC SODIUM 75 MG/1
75 TABLET, DELAYED RELEASE ORAL 2 TIMES DAILY
Qty: 60 TABLET | Refills: 1 | Status: SHIPPED | OUTPATIENT
Start: 2025-08-05

## 2025-08-05 RX ADMIN — TRIAMCINOLONE ACETONIDE 40 MG: 40 INJECTION, SUSPENSION INTRA-ARTICULAR; INTRAMUSCULAR at 15:54

## 2025-08-05 RX ADMIN — LIDOCAINE HYDROCHLORIDE 5 ML: 10 INJECTION, SOLUTION INFILTRATION; PERINEURAL at 15:54

## 2025-08-06 DIAGNOSIS — J45.30 MILD PERSISTENT ASTHMA WITHOUT COMPLICATION: ICD-10-CM

## 2025-08-06 RX ORDER — ALBUTEROL SULFATE 90 UG/1
1 AEROSOL, METERED RESPIRATORY (INHALATION) EVERY 4 HOURS PRN
Qty: 54 G | Refills: 0 | Status: SHIPPED | OUTPATIENT
Start: 2025-08-06

## 2025-08-07 RX ORDER — LIDOCAINE HYDROCHLORIDE 10 MG/ML
5 INJECTION, SOLUTION INFILTRATION; PERINEURAL
Status: COMPLETED | OUTPATIENT
Start: 2025-08-05 | End: 2025-08-05

## 2025-08-07 RX ORDER — TRIAMCINOLONE ACETONIDE 40 MG/ML
40 INJECTION, SUSPENSION INTRA-ARTICULAR; INTRAMUSCULAR
Status: COMPLETED | OUTPATIENT
Start: 2025-08-05 | End: 2025-08-05

## 2025-08-08 ENCOUNTER — OFFICE VISIT (OUTPATIENT)
Dept: FAMILY MEDICINE CLINIC | Age: 65
End: 2025-08-08
Payer: MEDICARE

## 2025-08-08 VITALS
OXYGEN SATURATION: 91 % | BODY MASS INDEX: 38.99 KG/M2 | DIASTOLIC BLOOD PRESSURE: 84 MMHG | TEMPERATURE: 97.7 F | SYSTOLIC BLOOD PRESSURE: 125 MMHG | WEIGHT: 228.4 LBS | HEART RATE: 82 BPM | HEIGHT: 64 IN

## 2025-08-08 DIAGNOSIS — E03.9 HYPOTHYROIDISM (ACQUIRED): ICD-10-CM

## 2025-08-08 DIAGNOSIS — E78.2 MIXED HYPERLIPIDEMIA: ICD-10-CM

## 2025-08-08 DIAGNOSIS — Z23 IMMUNIZATION DUE: ICD-10-CM

## 2025-08-08 DIAGNOSIS — E55.9 VITAMIN D DEFICIENCY: ICD-10-CM

## 2025-08-08 DIAGNOSIS — G43.119 INTRACTABLE MIGRAINE WITH AURA WITHOUT STATUS MIGRAINOSUS: ICD-10-CM

## 2025-08-08 DIAGNOSIS — E88.2 LIPOMATOSIS: ICD-10-CM

## 2025-08-08 DIAGNOSIS — Z12.31 SCREENING MAMMOGRAM FOR BREAST CANCER: ICD-10-CM

## 2025-08-08 DIAGNOSIS — Z00.00 ANNUAL PHYSICAL EXAM: Primary | ICD-10-CM

## 2025-08-08 RX ORDER — DOCUSATE SODIUM 100 MG/1
100 CAPSULE, LIQUID FILLED ORAL 2 TIMES DAILY
COMMUNITY

## 2025-08-28 ENCOUNTER — TELEPHONE (OUTPATIENT)
Dept: FAMILY MEDICINE CLINIC | Age: 65
End: 2025-08-28
Payer: MEDICARE